# Patient Record
Sex: MALE | Race: BLACK OR AFRICAN AMERICAN | NOT HISPANIC OR LATINO | Employment: UNEMPLOYED | URBAN - METROPOLITAN AREA
[De-identification: names, ages, dates, MRNs, and addresses within clinical notes are randomized per-mention and may not be internally consistent; named-entity substitution may affect disease eponyms.]

---

## 2017-07-08 ENCOUNTER — HOSPITAL ENCOUNTER (EMERGENCY)
Facility: HOSPITAL | Age: 57
Discharge: HOME | End: 2017-07-08
Attending: EMERGENCY MEDICINE
Payer: MEDICAID

## 2017-07-08 VITALS
OXYGEN SATURATION: 97 % | WEIGHT: 286.19 LBS | RESPIRATION RATE: 16 BRPM | BODY MASS INDEX: 47.68 KG/M2 | TEMPERATURE: 99 F | HEIGHT: 65 IN | HEART RATE: 84 BPM | DIASTOLIC BLOOD PRESSURE: 68 MMHG | SYSTOLIC BLOOD PRESSURE: 107 MMHG

## 2017-07-08 DIAGNOSIS — M25.50 ARTHRALGIA OF MULTIPLE JOINTS: ICD-10-CM

## 2017-07-08 DIAGNOSIS — M25.531 ARTHRALGIA OF RIGHT WRIST: Primary | ICD-10-CM

## 2017-07-08 PROCEDURE — 99284 EMERGENCY DEPT VISIT MOD MDM: CPT | Mod: ,,, | Performed by: EMERGENCY MEDICINE

## 2017-07-08 PROCEDURE — 99283 EMERGENCY DEPT VISIT LOW MDM: CPT

## 2017-07-08 RX ORDER — METHOCARBAMOL 750 MG/1
750-1500 TABLET, FILM COATED ORAL EVERY 8 HOURS PRN
Qty: 30 TABLET | Refills: 0 | Status: SHIPPED | OUTPATIENT
Start: 2017-07-08 | End: 2017-07-13

## 2017-07-08 RX ORDER — NAPROXEN SODIUM 220 MG
220 TABLET ORAL
Status: ON HOLD | COMMUNITY
End: 2022-09-29 | Stop reason: HOSPADM

## 2017-07-08 RX ORDER — HYDROGEN PEROXIDE 3 %
20 SOLUTION, NON-ORAL MISCELLANEOUS
COMMUNITY

## 2017-07-08 RX ORDER — DOXAZOSIN 2 MG/1
2 TABLET ORAL NIGHTLY
Status: ON HOLD | COMMUNITY
End: 2022-09-29 | Stop reason: HOSPADM

## 2017-07-08 RX ORDER — METHYLPREDNISOLONE 4 MG/1
TABLET ORAL
Qty: 1 PACKAGE | Refills: 0 | Status: ON HOLD | OUTPATIENT
Start: 2017-07-08 | End: 2022-09-29 | Stop reason: HOSPADM

## 2017-07-08 NOTE — ED PROVIDER NOTES
"Encounter Date: 7/8/2017    SCRIBE #1 NOTE: I, Oskar Whitaker, am scribing for, and in the presence of, Dr. Teran.       History     Chief Complaint   Patient presents with    Joint Pain     all my joints been hurting for past few days     Time seen by provider: 3:11 PM    This is a 56 y.o. male with pertinent PMHx of stomach ulcer, anemia, carpal tunnel syndrome, and back pain, who presents to the ED with a chief complaint of gradual onset worsening constant moderate joint pain in the wrists, elbows, knees, hips, and ankles all over the past week with associated subjective fever, chills, diaphoresis, headache, appetite loss, and weight loss (140-129 over past month). Pt had been seeing his PCP for these complaints among others but recently had his PCP changed and he is unsatisfied with his new PCP so came to the ED for further evaluation. Pt also complains of a "spot" or knot on his left upper back deep in the muscle that also has been bothering him and states that he has been taking aleve for these pains but his new PCP told him to take tylenol instead. Pt denies any recent injuries, nausea, vomiting, diarrhea, or urinary symptoms. Pt also endorses a secondary complaint of left sided chest pain that lasted for 4 days but has since resolved and is no longer present. Pt saw his PCP for this and had an EKG done which was normal. There are no other associated symptoms or mitigating/aggravating factors reported at this time.           Review of patient's allergies indicates:  Allergies not on file  Past Medical History:   Diagnosis Date    Anemia     Anxiety     Back pain     Carpal tunnel syndrome     Stomach ulcer     "bleeding"     No past surgical history on file.  No family history on file.  Social History   Substance Use Topics    Smoking status: Not on file    Smokeless tobacco: Not on file    Alcohol use Not on file     Review of Systems   Constitutional: Positive for appetite change, chills, " diaphoresis, fever and unexpected weight change.   Gastrointestinal: Negative for nausea and vomiting.   Musculoskeletal: Positive for arthralgias, joint swelling and myalgias.   Neurological: Positive for headaches.       Physical Exam     Initial Vitals [07/08/17 1401]   BP Pulse Resp Temp SpO2   107/68 84 16 98.9 °F (37.2 °C) 97 %      MAP       81         Physical Exam    Nursing note and vitals reviewed.  Constitutional: He appears well-developed and well-nourished. No distress.   HENT:   Head: Normocephalic and atraumatic.   Right Ear: External ear normal.   Left Ear: External ear normal.   Mouth/Throat: Oropharynx is clear and moist.   Cardiovascular: Normal rate, regular rhythm and normal heart sounds. Exam reveals no gallop and no friction rub.    No murmur heard.  Pulmonary/Chest: Breath sounds normal. No respiratory distress. He has no wheezes. He has no rhonchi. He has no rales.   Abdominal: Soft. He exhibits no distension. There is no tenderness.   Musculoskeletal: He exhibits edema and tenderness.   There is some mild edema of the right wrist and left ankle without erythema, warmth, or focal bony tenderness. Pt has pain with full ROM of the right wrist, right elbow, bilateral knees, and left ankle but he is able to fully range these joints without difficulty and there is no erythema or warmth of any joint with no obvious effusions of any joints.   There is a spasm to the left upper trapezius muscle palpation of which reproduces some tenderness.          ED Course   Procedures  Labs Reviewed - No data to display          Medical Decision Making:   History:   Old Medical Records: I decided to obtain old medical records.  Initial Assessment:   This is a 56 y.o. male who presents with a complaint of poly-arthralgias as well as subjective fevers and weight loss. This could represent degenerative joint disease but I am also concerned about he possibility of a rheumatologic condition. I advised the pt that it  is beyond the scope of the ED to fully work this up however I will treat the pt with a medrol dose pack for symptom control. I will also place the pt on Robaxin for the left upper back pain, I suspect the mass he has been feeling may be a muscle spasm and I recommended he reassess this mass after treatment with muscle relaxer's. It sounds as though the left chest pain has been already evaluated by his PCP and is no longer an issue but was possible musculoskeletal pain related to his left upper back spasm. I will give the pt the number for rheumatology clinic for follow up.             Scribe Attestation:   Scribe #1: I performed the above scribed service and the documentation accurately describes the services I performed. I attest to the accuracy of the note.    Attending Attestation:           Physician Attestation for Scribe:  Physician Attestation Statement for Scribe #1: I, Dr. Teran, reviewed documentation, as scribed by Oskar Whitaker in my presence, and it is both accurate and complete.                 ED Course     Clinical Impression:   The primary encounter diagnosis was Arthralgia of right wrist. A diagnosis of Arthralgia of multiple joints was also pertinent to this visit.    Disposition:   Disposition: Discharged  Condition: Stable                        Lor Ardon MD  07/12/17 2027

## 2017-07-08 NOTE — ED NOTES
Patient states it feels like something is attacking his joints. His knees, ankles, elbows have been bothering him starting July 1st. Patient has history of carpel tunnel. Denies recent injury or trauma. Patient states he had chest pain 3 days ago and he went to [facility] santi.  Denies chest pain currently, but states pain has been throbbing in nature.

## 2017-07-08 NOTE — DISCHARGE INSTRUCTIONS
Our goal in the emergency department is to always give you outstanding care and exceptional service. You may receive a survey by mail or e-mail in the next week regarding your experience in our ED. We would greatly appreciate your completing and returning the survey. Your feedback provides us with a way to recognize our staff who give very good care and it helps us learn how to improve when your experience was below our aspiration of excellence.     You will need to follow-up with rheumatology for further evaluation of your multiple joint pain as well as your weight loss.

## 2017-08-08 ENCOUNTER — HOSPITAL ENCOUNTER (EMERGENCY)
Facility: HOSPITAL | Age: 57
Discharge: HOME | End: 2017-08-08
Attending: EMERGENCY MEDICINE
Payer: MEDICAID

## 2017-08-08 VITALS
DIASTOLIC BLOOD PRESSURE: 87 MMHG | WEIGHT: 135 LBS | TEMPERATURE: 99 F | HEIGHT: 64 IN | OXYGEN SATURATION: 96 % | RESPIRATION RATE: 18 BRPM | BODY MASS INDEX: 23.05 KG/M2 | SYSTOLIC BLOOD PRESSURE: 145 MMHG | HEART RATE: 84 BPM

## 2017-08-08 DIAGNOSIS — S61.011A LACERATION OF RIGHT THUMB WITHOUT FOREIGN BODY WITHOUT DAMAGE TO NAIL, INITIAL ENCOUNTER: Primary | ICD-10-CM

## 2017-08-08 PROCEDURE — 63600175 PHARM REV CODE 636 W HCPCS: Performed by: EMERGENCY MEDICINE

## 2017-08-08 PROCEDURE — 12001 RPR S/N/AX/GEN/TRNK 2.5CM/<: CPT

## 2017-08-08 PROCEDURE — 90715 TDAP VACCINE 7 YRS/> IM: CPT | Performed by: EMERGENCY MEDICINE

## 2017-08-08 PROCEDURE — 99283 EMERGENCY DEPT VISIT LOW MDM: CPT | Mod: 25

## 2017-08-08 PROCEDURE — 99284 EMERGENCY DEPT VISIT MOD MDM: CPT | Mod: 25,,,

## 2017-08-08 PROCEDURE — 25000003 PHARM REV CODE 250: Performed by: EMERGENCY MEDICINE

## 2017-08-08 PROCEDURE — 90471 IMMUNIZATION ADMIN: CPT | Performed by: EMERGENCY MEDICINE

## 2017-08-08 PROCEDURE — 12001 RPR S/N/AX/GEN/TRNK 2.5CM/<: CPT | Mod: F5,,, | Performed by: EMERGENCY MEDICINE

## 2017-08-08 RX ORDER — LIDOCAINE HYDROCHLORIDE 10 MG/ML
10 INJECTION INFILTRATION; PERINEURAL ONCE
Status: COMPLETED | OUTPATIENT
Start: 2017-08-08 | End: 2017-08-08

## 2017-08-08 RX ADMIN — CLOSTRIDIUM TETANI TOXOID ANTIGEN (FORMALDEHYDE INACTIVATED), CORYNEBACTERIUM DIPHTHERIAE TOXOID ANTIGEN (FORMALDEHYDE INACTIVATED), BORDETELLA PERTUSSIS TOXOID ANTIGEN (GLUTARALDEHYDE INACTIVATED), BORDETELLA PERTUSSIS FILAMENTOUS HEMAGGLUTININ ANTIGEN (FORMALDEHYDE INACTIVATED), BORDETELLA PERTUSSIS PERTACTIN ANTIGEN, AND BORDETELLA PERTUSSIS FIMBRIAE 2/3 ANTIGEN 0.5 ML: 5; 2; 2.5; 5; 3; 5 INJECTION, SUSPENSION INTRAMUSCULAR at 06:08

## 2017-08-08 RX ADMIN — LIDOCAINE HYDROCHLORIDE 10 ML: 10 INJECTION, SOLUTION INFILTRATION; PERINEURAL at 07:08

## 2017-08-08 NOTE — ED TRIAGE NOTES
Laceration to right thumb while working on a toilet today.    GENERAL: The patient is well-developed and well-nourished in no apparent distress. Alert and oriented x4.                                                HEENT: Head is normocephalic and atraumatic. Extraocular muscles are intact. Pupils are equal, round, and reactive to light and accommodation. Nares appeared normal. Mouth is well hydrated and without lesions. Mucous membranes are moist. Posterior pharynx clear of any exudate or lesions.    NECK: Supple. No carotid bruits. No lymphadenopathy or thyromegaly.    LUNGS: Clear to auscultation.    HEART: Regular rate and rhythm without murmur.     ABDOMEN: Soft, nontender, and nondistended. Positive bowel sounds. No hepatosplenomegaly was noted.     EXTREMITIES: Without any cyanosis, clubbing, rash, lesions or edema.     NEUROLOGIC: Cranial nerves II through XII are grossly intact.     PSYCHIATRIC: Flat affect, but denies suicidal or homicidal ideations.    SKIN: No ulceration or induration present.

## 2017-08-09 NOTE — ED PROVIDER NOTES
"Encounter Date: 8/8/2017    SCRIBE #1 NOTE: I, Neha Rivera, am scribing for, and in the presence of,  Dr. Lewis. I have scribed the following portions of the note - the APC attestation.       History     Chief Complaint   Patient presents with    Laceration     after lifting broken toilet     57yo male with medical history of acid reflux presents to ED with thumb laceration. Around 3:30p this afternoon, patient was throwing a toilet into a dumpster when the edge caught his thumb. Patient controlled bleeding and poured peroxide over the wound. Patient not sure of last tetanus. Patient denies crush injury. Patient denies fever, chills, n/v, fever, chills, cp, sob, decreased sensation, weakness, syncope.            Review of patient's allergies indicates:  No Known Allergies  Past Medical History:   Diagnosis Date    Anemia     Anxiety     Arthritis     Back pain     Carpal tunnel syndrome     GERD (gastroesophageal reflux disease)     Stomach ulcer     "bleeding"     History reviewed. No pertinent surgical history.  Family History   Problem Relation Age of Onset    No Known Problems Mother     No Known Problems Father      Social History   Substance Use Topics    Smoking status: Current Every Day Smoker     Packs/day: 1.00     Years: 25.00     Types: Cigarettes    Smokeless tobacco: Never Used    Alcohol use No     Review of Systems   Constitutional: Negative for diaphoresis and fever.   HENT: Negative for nosebleeds.    Eyes: Negative for visual disturbance.   Respiratory: Negative for cough and shortness of breath.    Cardiovascular: Negative for chest pain and leg swelling.   Gastrointestinal: Negative for abdominal distention, nausea and vomiting.   Genitourinary: Negative for dysuria, flank pain and hematuria.   Musculoskeletal: Negative for neck pain.   Skin: Positive for wound. Negative for rash.   Neurological: Negative for syncope, weakness, light-headedness, numbness and headaches. "   Psychiatric/Behavioral: The patient is not nervous/anxious.        Physical Exam     Initial Vitals [08/08/17 1645]   BP Pulse Resp Temp SpO2   110/80 105 16 98.8 °F (37.1 °C) 97 %      MAP       90         Physical Exam    Vitals reviewed.  Constitutional: Vital signs are normal. He appears well-developed and well-nourished. He is not diaphoretic. No distress.   HENT:   Head: Normocephalic and atraumatic.   Nose: Nose normal.   Mouth/Throat: Oropharynx is clear and moist.   Eyes: Conjunctivae, EOM and lids are normal. Pupils are equal, round, and reactive to light. Lids are everted and swept, no foreign bodies found.   Neck: Trachea normal and normal range of motion. Neck supple.   Cardiovascular: Normal rate, regular rhythm, intact distal pulses and normal pulses.   Pulmonary/Chest: Breath sounds normal. He has no wheezes. He has no rhonchi. He has no rales. He exhibits no tenderness.   Abdominal: Soft. Normal appearance and bowel sounds are normal. He exhibits no distension. There is no tenderness.   Musculoskeletal: Normal range of motion.   Neurological: He is alert and oriented to person, place, and time. He has normal strength. No sensory deficit.   Skin: Skin is warm and dry. Capillary refill takes less than 2 seconds. Laceration noted. No rash noted. No cyanosis.   1.5cm laceration noted to right thumb distal to PIP. No tendon involvement. Bleeding controlled. Full ROM intact. Sensation intact. Strength intact.    Psychiatric: He has a normal mood and affect.         ED Course   Lac Repair  Date/Time: 8/8/2017 7:28 PM  Performed by: KRISTA COULTER  Authorized by: MIGUEL GREGG   Body area: upper extremity  Location details: right thumb  Laceration length: 1.5 cm  Foreign bodies: no foreign bodies  Tendon involvement: none  Nerve involvement: none  Anesthesia: local infiltration    Anesthesia:  Local Anesthetic: lidocaine 1% without epinephrine  Anesthetic total: 2 mL  Patient sedated:  no  Preparation: Patient was prepped and draped in the usual sterile fashion.  Irrigation solution: saline  Irrigation method: syringe  Amount of cleaning: standard  Skin closure: Ethilon (5.0 ethilon)  Number of sutures: 3  Technique: simple  Approximation: close  Approximation difficulty: simple  Dressing: non-stick sterile dressing  Patient tolerance: Patient tolerated the procedure well with no immediate complications  Comments: Patient gave verbal consent. Patient tolerated procedure well.         Labs Reviewed - No data to display          Medical Decision Making:   History:   Old Medical Records: I decided to obtain old medical records.       APC / Resident Notes:   57yo male with medical history of acid reflux presents to ED with thumb laceration. Cardiac exam reveals regular rate and rhythm. Lungs clear bilaterally on auscultation with no decreased breath sounds. No chest wall tenderness. Abdomen is soft, non tender, non distended with normal bowel sounds x 4. 1.5cm laceration noted to right thumb distal to PIP. No tendon involvement. Bleeding controlled. Full ROM intact. Sensation intact. Strength intact. Distal pulses intact.     3 sutures placed. Patient tolerate procedure well. Told to have sutures taken out in 7 days.     Tdap vaccine updated.     DDX includes but is not limited to laceration, cellulitis, tetanus up date. I considered but do not suspect a fracture.    Discharged to home in stable condition, return to ED warnings given, follow up and patient care instructions given.      I have discussed and reviewed with my supervising physician.          Scribe Attestation:   Scribe #1: I performed the above scribed service and the documentation accurately describes the services I performed. I attest to the accuracy of the note.    Attending Attestation:     Physician Attestation Statement for NP/PA:   I discussed this assessment and plan of this patient with the NP/PA, but I did not personally  examine the patient. The face to face encounter was performed by the NP/PA.    Other NP/PA Attestation Additions:      Medical Decision Making: Laceration       Physician Attestation for Scribe:  Physician Attestation Statement for Scribe #1: I, Dr. Lewis, reviewed documentation, as scribed by Neha Rivera in my presence, and it is both accurate and complete.                 ED Course     Clinical Impression:   The encounter diagnosis was Laceration of right thumb without foreign body without damage to nail, initial encounter.    Disposition:   Disposition: Discharged  Condition: Stable                        MALLORY GilletteC  08/08/17 2040

## 2017-08-16 ENCOUNTER — HOSPITAL ENCOUNTER (EMERGENCY)
Facility: HOSPITAL | Age: 57
Discharge: HOME | End: 2017-08-16
Attending: FAMILY MEDICINE
Payer: MEDICAID

## 2017-08-16 VITALS
WEIGHT: 139 LBS | SYSTOLIC BLOOD PRESSURE: 120 MMHG | DIASTOLIC BLOOD PRESSURE: 82 MMHG | OXYGEN SATURATION: 98 % | TEMPERATURE: 98 F | BODY MASS INDEX: 23.73 KG/M2 | RESPIRATION RATE: 16 BRPM | HEIGHT: 64 IN | HEART RATE: 78 BPM

## 2017-08-16 DIAGNOSIS — S61.011D: Primary | ICD-10-CM

## 2017-08-16 DIAGNOSIS — Z48.02 ENCOUNTER FOR REMOVAL OF SUTURES: ICD-10-CM

## 2017-08-16 PROCEDURE — 99281 EMR DPT VST MAYX REQ PHY/QHP: CPT | Mod: ,,, | Performed by: FAMILY MEDICINE

## 2017-08-16 PROCEDURE — 99281 EMR DPT VST MAYX REQ PHY/QHP: CPT

## 2017-08-16 NOTE — ED TRIAGE NOTES
Patient here for suture removal right thumb.  Margins are well approximated and incision line closed with scar tissue formation.  Has 2 sutures remaining.

## 2017-08-16 NOTE — ED PROVIDER NOTES
"Encounter Date: 8/16/2017    SCRIBE #1 NOTE: IShilpi, am scribing for, and in the presence of, Dr. Santos.       History     Chief Complaint   Patient presents with    Suture / Staple Removal     Time seen by provider: 10:42 AM    This is a 56 y.o. male with a history of carpal tunnel syndrome and arthritis who presents with complaint of laceration to the right thumb that was sutured here 9 days ago.  The patient returns for suture removal. He denies any problems with the wound, fever, or chills and indicates that his thumb ROM is at baseline.      The history is provided by the patient.     Review of patient's allergies indicates:  No Known Allergies  Past Medical History:   Diagnosis Date    Anemia     Anxiety     Arthritis     Back pain     Carpal tunnel syndrome     GERD (gastroesophageal reflux disease)     Stomach ulcer     "bleeding"     History reviewed. No pertinent surgical history.  Family History   Problem Relation Age of Onset    No Known Problems Mother     No Known Problems Father      Social History   Substance Use Topics    Smoking status: Current Every Day Smoker     Packs/day: 1.00     Years: 25.00     Types: Cigarettes    Smokeless tobacco: Never Used    Alcohol use No     Review of Systems   Constitutional: Negative for chills and fever.   Musculoskeletal:        Full ROM in right thumb. Denies problems with the wound.       Physical Exam     Initial Vitals [08/16/17 0939]   BP Pulse Resp Temp SpO2   120/82 78 16 98 °F (36.7 °C) 98 %      MAP       94.67         Physical Exam    Nursing note and vitals reviewed.  Musculoskeletal: Normal range of motion.   Full ROM in the right thumb.   Neurological:   Neurovascular is at baseline.   Skin:   Semicircular 1 CM laceration over the dorsum of the right first metacarpal joint. This is well-healed. The suture is removed. Negative for drainage or secondary infection.         ED Course   Procedures  Labs Reviewed - No data to display   "        Medical Decision Making:   History:   Old Medical Records: I decided to obtain old medical records.  ED Management:  Wound clean.  Sutures removed.  Patient stable for discharge            Scribe Attestation:   Scribe #1: I performed the above scribed service and the documentation accurately describes the services I performed. I attest to the accuracy of the note.    Attending Attestation:           Physician Attestation for Scribe:  Physician Attestation Statement for Scribe #1: I, Dr. Santos, reviewed documentation, as scribed by Shilpi Cordoba in my presence, and it is both accurate and complete.                 ED Course     Clinical Impression:   There were no encounter diagnoses.    Disposition:   Disposition: Discharged  Condition: Stable                        Danis Santos MD  08/16/17 1078

## 2018-01-30 ENCOUNTER — HOSPITAL ENCOUNTER (EMERGENCY)
Facility: HOSPITAL | Age: 58
Discharge: HOME | End: 2018-01-30
Attending: EMERGENCY MEDICINE
Payer: MEDICAID

## 2018-01-30 VITALS
TEMPERATURE: 101 F | BODY MASS INDEX: 24.03 KG/M2 | OXYGEN SATURATION: 96 % | RESPIRATION RATE: 18 BRPM | DIASTOLIC BLOOD PRESSURE: 68 MMHG | WEIGHT: 140 LBS | SYSTOLIC BLOOD PRESSURE: 121 MMHG | HEART RATE: 115 BPM

## 2018-01-30 DIAGNOSIS — R07.81 RIB PAIN: ICD-10-CM

## 2018-01-30 DIAGNOSIS — R05.9 COUGH: ICD-10-CM

## 2018-01-30 DIAGNOSIS — R68.89 FLU-LIKE SYMPTOMS: Primary | ICD-10-CM

## 2018-01-30 PROCEDURE — 25000003 PHARM REV CODE 250: Performed by: EMERGENCY MEDICINE

## 2018-01-30 PROCEDURE — 99283 EMERGENCY DEPT VISIT LOW MDM: CPT

## 2018-01-30 PROCEDURE — 99284 EMERGENCY DEPT VISIT MOD MDM: CPT | Mod: ,,, | Performed by: EMERGENCY MEDICINE

## 2018-01-30 RX ORDER — OSELTAMIVIR PHOSPHATE 75 MG/1
75 CAPSULE ORAL 2 TIMES DAILY
Qty: 10 CAPSULE | Refills: 0 | Status: SHIPPED | OUTPATIENT
Start: 2018-01-30 | End: 2018-02-04

## 2018-01-30 RX ORDER — ACETAMINOPHEN 325 MG/1
650 TABLET ORAL
Status: COMPLETED | OUTPATIENT
Start: 2018-01-30 | End: 2018-01-30

## 2018-01-30 RX ORDER — BENZONATATE 200 MG/1
200 CAPSULE ORAL 3 TIMES DAILY PRN
Qty: 20 CAPSULE | Refills: 0 | Status: SHIPPED | OUTPATIENT
Start: 2018-01-30

## 2018-01-30 RX ADMIN — ACETAMINOPHEN 650 MG: 325 TABLET ORAL at 11:01

## 2018-01-30 NOTE — Clinical Note
Darshan Hoskins discharge to home/self care.    - Condition on Discharge: Good.  - Patient walked out of the emergency department.  - The patient left the ED  - The discharge instructions were discussed with the patient/parent.  - They state an understand ing of the discharge instructions.  - Instructed patient/parent to go to the discharge window.

## 2018-01-30 NOTE — ED TRIAGE NOTES
Presents to ER with flu like symptoms since last Friday.., Reports back pain, left rib pain, productive cough of green sputum, and generalized body aches.    Pt identifiers checked and correct  LOC: The patient is awake, alert, aware of environment with an appropriate affect. Oriented x3, speaking appropriately  APPEARANCE: Pt resting comfortably, in no acute distress, pt is clean and well groomed, clothing properly fastened  SKIN: Skin warm, dry and intact, normal skin turgor, moist mucus membranes  RESPIRATORY: Airway is open and patent, respirations are spontaneous, even and unlabored, normal effort and rate  MUSCULOSKELETAL: No obvious deformities.

## 2018-01-30 NOTE — ED PROVIDER NOTES
"Encounter Date: 1/30/2018    SCRIBE #1 NOTE: I, Juan Sparrow, am scribing for, and in the presence of,  Obi Avila MD. I have scribed the entire note.       History     Chief Complaint   Patient presents with    flu-like symptoms     headaches, cough, fever     Time seen by provider: 10:30 AM    This is a 57 y.o. male who presents to the Emergency Department with complaint of headache, generalized body aches particularly to his back and left side, productive cough with green sputum, sore throat, intermittent fever for the last 4 days. Patient state that his symptoms have been gradually worsening since initial onset, prompting visit the ED today.       The history is provided by the patient and medical records.     Review of patient's allergies indicates:  No Known Allergies  Past Medical History:   Diagnosis Date    Anemia     Anxiety     Arthritis     Back pain     Carpal tunnel syndrome     GERD (gastroesophageal reflux disease)     Stomach ulcer     "bleeding"     History reviewed. No pertinent surgical history.  Family History   Problem Relation Age of Onset    No Known Problems Mother     No Known Problems Father      Social History   Substance Use Topics    Smoking status: Current Every Day Smoker     Packs/day: 1.00     Years: 25.00     Types: Cigarettes    Smokeless tobacco: Never Used    Alcohol use No     Review of Systems   Constitutional: Positive for fever.   HENT: Positive for sore throat.    Respiratory: Positive for cough. Negative for shortness of breath.    Cardiovascular: Negative for chest pain.   Gastrointestinal: Negative for nausea.   Genitourinary: Negative for dysuria.   Musculoskeletal: Negative for back pain.        Positive for body aches. Positive for left-sided rib pain.   Skin: Negative for rash.   Neurological: Positive for headaches. Negative for weakness.   Hematological: Does not bruise/bleed easily.       Physical Exam     Initial Vitals [01/30/18 0944]   BP Pulse Resp " Temp SpO2   121/68 (!) 115 18 (!) 101.3 °F (38.5 °C) 96 %      MAP       85.67         Physical Exam    Nursing note and vitals reviewed.  Constitutional: He appears well-developed and well-nourished. He is not diaphoretic. No distress.   HENT:   Head: Normocephalic and atraumatic.   Mouth/Throat: Posterior oropharyngeal erythema present. No oropharyngeal exudate.   Mild erythema and edema to nasal mucosa with yellowish discharge.    Eyes: Conjunctivae and EOM are normal. Pupils are equal, round, and reactive to light.   Neck: Normal range of motion. Neck supple. No JVD present.   Cardiovascular: Normal rate, regular rhythm and normal heart sounds.   No murmur heard.  Pulmonary/Chest: No respiratory distress. He has wheezes (mild expiratory wheezing). He has no rhonchi. He has no rales.   Patient has adventitious breath sounds.   Abdominal: Soft. Bowel sounds are normal. He exhibits no distension and no mass. There is no tenderness. There is no rebound and no guarding.   Musculoskeletal: Normal range of motion. He exhibits no edema or tenderness.   Neurological: He is alert and oriented to person, place, and time. He has normal strength. No cranial nerve deficit or sensory deficit.   Skin: Skin is warm and dry. No rash noted.   Psychiatric: He has a normal mood and affect.         ED Course   Procedures  Labs Reviewed - No data to display       X-Rays:   Independently Interpreted Readings:   Chest X-Ray: Normal heart size.  No infiltrates.  No acute abnormalities.     Medical Decision Making:   History:   Old Medical Records: I decided to obtain old medical records.  Initial Assessment:   This is a 57 y.o. male who I believe has a viral upper respiratory infection.    Workup including chest X-ray was negative.  Based upon the H&P, workup the patient does not appear to have a serious bacterial infection.  I doubt pneumonia, sepsis, AOM, bacterial sinusitis, strep pharyngitis, peritonsillar abscess, meningitis.   I  believe that no further workup/treatment is needed at this time and that the patient is stable for discharge.  Plan to give Rx for Tamflu and tessalon Perles considering the patient is febrile and has flu-like symptoms. Will not give steroids because of suspected flu.    Clinical impression and plan discussed with patient.   Pt to call for follow up with PCP or referred physician in 7 days.   Pt is to return to the ED immediately for any new or worsening symptoms, or for any other concerns.    Pt had time for ask questions to which they were addressed. Pt expressed understanding.  Independently Interpreted Test(s):   I have ordered and independently interpreted X-rays - see prior notes.  Clinical Tests:   Radiological Study: Ordered and Reviewed            Scribe Attestation:   Scribe #1: I performed the above scribed service and the documentation accurately describes the services I performed. I attest to the accuracy of the note.            ED Course      Clinical Impression:   The primary encounter diagnosis was Flu-like symptoms. Diagnoses of Cough and Rib pain were also pertinent to this visit.    Disposition:   Disposition: Discharged  Condition: Stable       I, Dr. Obi Avila, personally performed the services described in this documentation.   All medical record entries made by the scribe were at my direction and in my presence.   I have reviewed the chart and agree that the record is accurate and complete.   Obi Avila MD.                      Obi Avila MD  02/05/18 0007

## 2021-02-05 ENCOUNTER — HOSPITAL ENCOUNTER (EMERGENCY)
Facility: HOSPITAL | Age: 61
Discharge: HOME | End: 2021-02-05
Attending: EMERGENCY MEDICINE
Payer: MEDICAID

## 2021-02-05 VITALS
DIASTOLIC BLOOD PRESSURE: 73 MMHG | HEART RATE: 93 BPM | BODY MASS INDEX: 22.88 KG/M2 | TEMPERATURE: 97 F | WEIGHT: 134 LBS | SYSTOLIC BLOOD PRESSURE: 105 MMHG | HEIGHT: 64 IN | OXYGEN SATURATION: 96 % | RESPIRATION RATE: 18 BRPM

## 2021-02-05 DIAGNOSIS — R53.83 FATIGUE, UNSPECIFIED TYPE: Primary | ICD-10-CM

## 2021-02-05 DIAGNOSIS — R35.89 POLYURIA: ICD-10-CM

## 2021-02-05 LAB
BILIRUB UR QL STRIP: NEGATIVE
BUN SERPL-MCNC: 18 MG/DL (ref 6–30)
CHLORIDE SERPL-SCNC: 100 MMOL/L (ref 95–110)
CLARITY UR REFRACT.AUTO: CLEAR
COLOR UR AUTO: YELLOW
CREAT SERPL-MCNC: 1.3 MG/DL (ref 0.5–1.4)
CTP QC/QA: YES
GLUCOSE SERPL-MCNC: 87 MG/DL (ref 70–110)
GLUCOSE UR QL STRIP: NEGATIVE
HCT VFR BLD CALC: 48 %PCV (ref 36–54)
HCV AB SERPL QL IA: NEGATIVE
HGB UR QL STRIP: NEGATIVE
HIV 1+2 AB+HIV1 P24 AG SERPL QL IA: NEGATIVE
KETONES UR QL STRIP: NEGATIVE
LEUKOCYTE ESTERASE UR QL STRIP: NEGATIVE
NITRITE UR QL STRIP: NEGATIVE
PH UR STRIP: 5 [PH] (ref 5–8)
POC IONIZED CALCIUM: 1.14 MMOL/L (ref 1.06–1.42)
POC TCO2 (MEASURED): 27 MMOL/L (ref 23–29)
POCT GLUCOSE: 81 MG/DL (ref 70–110)
POTASSIUM BLD-SCNC: 4.4 MMOL/L (ref 3.5–5.1)
PROT UR QL STRIP: NEGATIVE
SAMPLE: NORMAL
SARS-COV-2 RDRP RESP QL NAA+PROBE: NEGATIVE
SODIUM BLD-SCNC: 136 MMOL/L (ref 136–145)
SP GR UR STRIP: 1.01 (ref 1–1.03)
URN SPEC COLLECT METH UR: NORMAL

## 2021-02-05 PROCEDURE — 99284 PR EMERGENCY DEPT VISIT,LEVEL IV: ICD-10-PCS | Mod: ,,, | Performed by: PHYSICIAN ASSISTANT

## 2021-02-05 PROCEDURE — 81003 URINALYSIS AUTO W/O SCOPE: CPT

## 2021-02-05 PROCEDURE — 82962 GLUCOSE BLOOD TEST: CPT

## 2021-02-05 PROCEDURE — 86803 HEPATITIS C AB TEST: CPT

## 2021-02-05 PROCEDURE — U0002 COVID-19 LAB TEST NON-CDC: HCPCS | Performed by: EMERGENCY MEDICINE

## 2021-02-05 PROCEDURE — 99283 EMERGENCY DEPT VISIT LOW MDM: CPT

## 2021-02-05 PROCEDURE — 80047 BASIC METABLC PNL IONIZED CA: CPT

## 2021-02-05 PROCEDURE — 99284 EMERGENCY DEPT VISIT MOD MDM: CPT | Mod: ,,, | Performed by: PHYSICIAN ASSISTANT

## 2021-02-05 PROCEDURE — 86703 HIV-1/HIV-2 1 RESULT ANTBDY: CPT

## 2021-02-05 RX ORDER — TAMSULOSIN HYDROCHLORIDE 0.4 MG/1
0.4 CAPSULE ORAL DAILY
COMMUNITY

## 2021-02-05 RX ORDER — PANTOPRAZOLE SODIUM 40 MG/1
40 TABLET, DELAYED RELEASE ORAL DAILY
Status: ON HOLD | COMMUNITY
End: 2022-09-29 | Stop reason: HOSPADM

## 2021-02-05 RX ORDER — BUDESONIDE AND FORMOTEROL FUMARATE DIHYDRATE 160; 4.5 UG/1; UG/1
2 AEROSOL RESPIRATORY (INHALATION) EVERY 12 HOURS
COMMUNITY

## 2022-09-20 ENCOUNTER — HOSPITAL ENCOUNTER (EMERGENCY)
Facility: HOSPITAL | Age: 62
Discharge: HOME | End: 2022-09-20
Attending: EMERGENCY MEDICINE
Payer: MEDICAID

## 2022-09-20 VITALS
WEIGHT: 134.06 LBS | RESPIRATION RATE: 18 BRPM | SYSTOLIC BLOOD PRESSURE: 117 MMHG | DIASTOLIC BLOOD PRESSURE: 74 MMHG | OXYGEN SATURATION: 95 % | BODY MASS INDEX: 23.01 KG/M2 | TEMPERATURE: 99 F | HEART RATE: 91 BPM

## 2022-09-20 DIAGNOSIS — R07.2 PRECORDIAL PAIN: Primary | ICD-10-CM

## 2022-09-20 DIAGNOSIS — R07.9 CHEST PAIN: ICD-10-CM

## 2022-09-20 LAB
ALBUMIN SERPL BCP-MCNC: 3.6 G/DL (ref 3.5–5.2)
ALP SERPL-CCNC: 102 U/L (ref 55–135)
ALT SERPL W/O P-5'-P-CCNC: 24 U/L (ref 10–44)
ANION GAP SERPL CALC-SCNC: 6 MMOL/L (ref 8–16)
AST SERPL-CCNC: 33 U/L (ref 10–40)
BASOPHILS # BLD AUTO: 0.02 K/UL (ref 0–0.2)
BASOPHILS NFR BLD: 0.4 % (ref 0–1.9)
BILIRUB SERPL-MCNC: 0.2 MG/DL (ref 0.1–1)
BUN SERPL-MCNC: 11 MG/DL (ref 8–23)
CALCIUM SERPL-MCNC: 9.1 MG/DL (ref 8.7–10.5)
CHLORIDE SERPL-SCNC: 101 MMOL/L (ref 95–110)
CO2 SERPL-SCNC: 29 MMOL/L (ref 23–29)
CREAT SERPL-MCNC: 1.3 MG/DL (ref 0.5–1.4)
DIFFERENTIAL METHOD: ABNORMAL
EOSINOPHIL # BLD AUTO: 0 K/UL (ref 0–0.5)
EOSINOPHIL NFR BLD: 0.2 % (ref 0–8)
ERYTHROCYTE [DISTWIDTH] IN BLOOD BY AUTOMATED COUNT: 13.5 % (ref 11.5–14.5)
EST. GFR  (NO RACE VARIABLE): >60 ML/MIN/1.73 M^2
GLUCOSE SERPL-MCNC: 90 MG/DL (ref 70–110)
HCT VFR BLD AUTO: 44.1 % (ref 40–54)
HCV AB SERPL QL IA: NORMAL
HGB BLD-MCNC: 15 G/DL (ref 14–18)
HIV 1+2 AB+HIV1 P24 AG SERPL QL IA: NORMAL
IMM GRANULOCYTES # BLD AUTO: 0.01 K/UL (ref 0–0.04)
IMM GRANULOCYTES NFR BLD AUTO: 0.2 % (ref 0–0.5)
LYMPHOCYTES # BLD AUTO: 1.5 K/UL (ref 1–4.8)
LYMPHOCYTES NFR BLD: 30.8 % (ref 18–48)
MCH RBC QN AUTO: 32.1 PG (ref 27–31)
MCHC RBC AUTO-ENTMCNC: 34 G/DL (ref 32–36)
MCV RBC AUTO: 94 FL (ref 82–98)
MONOCYTES # BLD AUTO: 0.4 K/UL (ref 0.3–1)
MONOCYTES NFR BLD: 7.6 % (ref 4–15)
NEUTROPHILS # BLD AUTO: 2.9 K/UL (ref 1.8–7.7)
NEUTROPHILS NFR BLD: 60.8 % (ref 38–73)
NRBC BLD-RTO: 0 /100 WBC
PLATELET # BLD AUTO: 204 K/UL (ref 150–450)
PMV BLD AUTO: 9.3 FL (ref 9.2–12.9)
POTASSIUM SERPL-SCNC: 4.2 MMOL/L (ref 3.5–5.1)
PROT SERPL-MCNC: 7.4 G/DL (ref 6–8.4)
RBC # BLD AUTO: 4.67 M/UL (ref 4.6–6.2)
SARS-COV-2 RDRP RESP QL NAA+PROBE: NEGATIVE
SODIUM SERPL-SCNC: 136 MMOL/L (ref 136–145)
TROPONIN I SERPL DL<=0.01 NG/ML-MCNC: <0.006 NG/ML (ref 0–0.03)
WBC # BLD AUTO: 4.84 K/UL (ref 3.9–12.7)

## 2022-09-20 PROCEDURE — 99285 EMERGENCY DEPT VISIT HI MDM: CPT | Mod: 25

## 2022-09-20 PROCEDURE — 93010 EKG 12-LEAD: ICD-10-PCS | Mod: ,,, | Performed by: INTERNAL MEDICINE

## 2022-09-20 PROCEDURE — 86803 HEPATITIS C AB TEST: CPT | Performed by: PHYSICIAN ASSISTANT

## 2022-09-20 PROCEDURE — 99285 EMERGENCY DEPT VISIT HI MDM: CPT | Mod: CS,,, | Performed by: EMERGENCY MEDICINE

## 2022-09-20 PROCEDURE — 25000003 PHARM REV CODE 250: Performed by: EMERGENCY MEDICINE

## 2022-09-20 PROCEDURE — 80053 COMPREHEN METABOLIC PANEL: CPT | Performed by: EMERGENCY MEDICINE

## 2022-09-20 PROCEDURE — 87389 HIV-1 AG W/HIV-1&-2 AB AG IA: CPT | Performed by: PHYSICIAN ASSISTANT

## 2022-09-20 PROCEDURE — 85025 COMPLETE CBC W/AUTO DIFF WBC: CPT | Performed by: EMERGENCY MEDICINE

## 2022-09-20 PROCEDURE — 93010 ELECTROCARDIOGRAM REPORT: CPT | Mod: ,,, | Performed by: INTERNAL MEDICINE

## 2022-09-20 PROCEDURE — 99285 PR EMERGENCY DEPT VISIT,LEVEL V: ICD-10-PCS | Mod: CS,,, | Performed by: EMERGENCY MEDICINE

## 2022-09-20 PROCEDURE — U0002 COVID-19 LAB TEST NON-CDC: HCPCS | Performed by: EMERGENCY MEDICINE

## 2022-09-20 PROCEDURE — 84484 ASSAY OF TROPONIN QUANT: CPT | Performed by: EMERGENCY MEDICINE

## 2022-09-20 PROCEDURE — 93005 ELECTROCARDIOGRAM TRACING: CPT

## 2022-09-20 RX ORDER — ASPIRIN 325 MG
325 TABLET ORAL
Status: COMPLETED | OUTPATIENT
Start: 2022-09-20 | End: 2022-09-20

## 2022-09-20 RX ADMIN — ASPIRIN 325 MG ORAL TABLET 325 MG: 325 PILL ORAL at 09:09

## 2022-09-20 NOTE — DISCHARGE INSTRUCTIONS
Continue take Tylenol/ibuprofen as needed for pain.  Follow-up with primary doctor for further outpatient testing if they feel it is indicated.  Return to emergency department for worsening symptoms.

## 2022-09-20 NOTE — ED PROVIDER NOTES
"Encounter Date: 9/20/2022       History     Chief Complaint   Patient presents with    Dizziness     X1 week. Denies syncopal episode.      Patient is a 61-year-old male past medical history of anxiety, arthritis, back pain, chronic shoulder pain presenting today with multiple complaints.  Patient states for the past week he has had intermittent left-sided chest pain that occurs while at rest.  He also reports chronic left shoulder pain, worse with movement.  He has occasionally had episodes of lightheadedness while walking.  No fevers or chills.  No infectious symptoms.  Denies abdominal pain but does report nausea without vomiting.  No history of cardiac issues, no stents in the past.    Review of patient's allergies indicates:  No Known Allergies  Past Medical History:   Diagnosis Date    Anemia     Anxiety     Arthritis     Back pain     Carpal tunnel syndrome     GERD (gastroesophageal reflux disease)     Stomach ulcer     "bleeding"     No past surgical history on file.  Family History   Problem Relation Age of Onset    No Known Problems Mother     No Known Problems Father      Social History     Tobacco Use    Smoking status: Every Day     Packs/day: 1.00     Years: 25.00     Pack years: 25.00     Types: Cigarettes    Smokeless tobacco: Never   Substance Use Topics    Alcohol use: No    Drug use: No     Review of Systems   Constitutional:  Negative for chills and fever.   HENT:  Negative for sore throat.    Respiratory:  Negative for cough and shortness of breath.    Cardiovascular:  Positive for chest pain.   Gastrointestinal:  Positive for nausea. Negative for abdominal pain and vomiting.   Genitourinary:  Negative for dysuria.   Musculoskeletal:  Positive for arthralgias (left shoulder pain). Negative for back pain.   Skin:  Negative for rash.   Neurological:  Positive for light-headedness. Negative for weakness.   Hematological:  Does not bruise/bleed easily.     Physical Exam     Initial Vitals [09/20/22 " 0800]   BP Pulse Resp Temp SpO2   135/81 96 18 99.1 °F (37.3 °C) 97 %      MAP       --         Physical Exam    Nursing note and vitals reviewed.  Constitutional: He appears well-developed and well-nourished. No distress.   HENT:   Mouth/Throat: Oropharynx is clear and moist.   Eyes: Conjunctivae are normal.   Neck: Neck supple.   Cardiovascular:  Normal rate, regular rhythm and intact distal pulses.           Pulmonary/Chest: Breath sounds normal. He has no wheezes. He has no rales.   Abdominal: Abdomen is soft. Bowel sounds are normal. There is no abdominal tenderness.   Musculoskeletal:         General: No edema.      Cervical back: Neck supple.      Comments: FROM of left shoulder, no effusion         Lymphadenopathy:     He has no cervical adenopathy.   Neurological: He is alert and oriented to person, place, and time. He has normal strength.   Skin: No rash noted.   Psychiatric: He has a normal mood and affect.       ED Course   Procedures  Labs Reviewed   CBC W/ AUTO DIFFERENTIAL - Abnormal; Notable for the following components:       Result Value    MCH 32.1 (*)     All other components within normal limits   COMPREHENSIVE METABOLIC PANEL - Abnormal; Notable for the following components:    Anion Gap 6 (*)     All other components within normal limits   HIV 1 / 2 ANTIBODY    Narrative:     Release to patient->Immediate   HEPATITIS C ANTIBODY    Narrative:     Release to patient->Immediate   TROPONIN I   SARS-COV-2 RNA AMPLIFICATION, QUAL     EKG Readings: (Independently Interpreted)   EKG:  Normal sinus rhythm at 87, normal intervals, normal axis, no ischemic changes     Imaging Results              X-Ray Chest PA And Lateral (Final result)  Result time 09/20/22 09:56:55      Final result by Santiago Wilburn MD (09/20/22 09:56:55)                   Impression:      No acute radiographic findings in the chest.      Electronically signed by: Santiago Wilburn MD  Date:    09/20/2022  Time:    09:56                Narrative:    EXAMINATION:  XR CHEST PA AND LATERAL    CLINICAL HISTORY:  Chest Pain;    TECHNIQUE:  PA and lateral views of the chest were performed.    COMPARISON:  01/30/2018    FINDINGS:  The lungs are clear, with normal appearance of pulmonary vasculature and no pleural effusion or pneumothorax.    The cardiac silhouette is normal in size. The hilar and mediastinal contours are unremarkable.    Visualized osseous structures show no acute abnormalities.  Degenerative changes of the cervical spine partially visualized.                                       Medications   aspirin tablet 325 mg (325 mg Oral Given 9/20/22 0911)     Medical Decision Making:   History:   Old Medical Records: I decided to obtain old medical records.  Initial Assessment:   Emergent evaluation of 61-year-old male presenting today with intermittent left-sided chest pain, left shoulder pain.  Vital signs stable  Well-appearing, no acute distress  Differential Diagnosis:   ACS, MI, costochondritis, pneumonia, pleural effusion  Independently Interpreted Test(s):   I have ordered and independently interpreted X-rays - see prior notes.  I have ordered and independently interpreted EKG Reading(s) - see prior notes  Clinical Tests:   Lab Tests: Reviewed and Ordered  Radiological Study: Ordered and Reviewed  Medical Tests: Ordered and Reviewed  ED Management:  - labs  - EKG  - CXR  Additional MDM:   Heart Score:    History:          Slightly suspicious.  ECG:             Normal  Age:               45-65 years  Risk factors: no risk factors known  Troponin:       Less than or equal to normal limit  Final Score: 1          ED Course as of 09/20/22 1029   Tue Sep 20, 2022   1024 Troponin I: <0.006 [GM]   1024 BUN: 11 [GM]   1024 Creatinine: 1.3 [GM]   1025 Hemoglobin: 15.0 [GM]   1025 CXR: no acute process   [GM]   1025 Patient re-evaluated, no active chest pain at this time.  This pain is reproducible on exam, low suspicion for cardiac etiology at  this time given negative workup.  Chest x-ray without acute process.  Recommend continue Tylenol/ibuprofen as needed as an outpatient for pain.  Return to emergency department if symptoms get worse.  Follow-up with PCP for further outpatient testing. [GM]      ED Course User Index  [GM] Areli Acosta MD                 Clinical Impression:   Final diagnoses:  [R07.9] Chest pain               Areli Acosta MD  09/20/22 1028       Areli Acosta MD  09/20/22 1030

## 2022-09-20 NOTE — Clinical Note
"Darshan Rudolph" Aidee was seen and treated in our emergency department on 9/20/2022.  He may return to work on 09/21/2022.       If you have any questions or concerns, please don't hesitate to call.      DR ALEXIS Acosta/ BETHANIE Gomez    "

## 2022-09-20 NOTE — ED NOTES
Patient identifiers verified and correct for Darshan Hoskins  LOC: The patient is awake, alert and aware of environment with an appropriate affect, the patient is oriented x 3 and speaking appropriately.   APPEARANCE: Patient appears comfortable and in no acute distress, patient is clean and well groomed.  SKIN: The skin is warm and dry, color consistent with ethnicity, patient has normal skin turgor and moist mucus membranes, skin intact, no breakdown or bruising noted.   MUSCULOSKELETAL: Patient moving all extremities spontaneously, no swelling noted.  RESPIRATORY: Airway is open and patent, respirations are spontaneous, patient has a normal effort and rate, no accessory muscle use noted, pt placed on continuous pulse ox with O2 sats noted at 97% on room air.  CARDIAC: Pt placed on cardiac monitor. Patient has a normal rate and regular rhythm, no edema noted, capillary refill < 3 seconds.   GASTRO: Soft and non tender to palpation, no distention noted, normoactive bowel sounds present in all four quadrants. Pt states bowel movements have been regular.  : Pt denies any pain or frequency with urination.  NEURO: Pt opens eyes spontaneously, behavior appropriate to situation, follows commands, facial expression symmetrical, bilateral hand grasp equal and even, purposeful motor response noted, normal sensation in all extremities when touched with a finger. Pt reports dizziness x1 week.

## 2022-09-24 ENCOUNTER — HOSPITAL ENCOUNTER (INPATIENT)
Facility: HOSPITAL | Age: 62
LOS: 5 days | Discharge: HOME | DRG: 872 | End: 2022-09-29
Attending: EMERGENCY MEDICINE | Admitting: HOSPITALIST
Payer: MEDICAID

## 2022-09-24 DIAGNOSIS — R53.1 WEAKNESS: ICD-10-CM

## 2022-09-24 DIAGNOSIS — A41.9 SEPSIS, DUE TO UNSPECIFIED ORGANISM, UNSPECIFIED WHETHER ACUTE ORGAN DYSFUNCTION PRESENT: Primary | ICD-10-CM

## 2022-09-24 DIAGNOSIS — N40.0 BENIGN PROSTATIC HYPERPLASIA, UNSPECIFIED WHETHER LOWER URINARY TRACT SYMPTOMS PRESENT: ICD-10-CM

## 2022-09-24 DIAGNOSIS — R07.9 CHEST PAIN: ICD-10-CM

## 2022-09-24 DIAGNOSIS — D72.819 LEUKOPENIA, UNSPECIFIED TYPE: ICD-10-CM

## 2022-09-24 DIAGNOSIS — N39.0 URINARY TRACT INFECTION WITH HEMATURIA, SITE UNSPECIFIED: ICD-10-CM

## 2022-09-24 DIAGNOSIS — N39.0 URINARY TRACT INFECTION WITHOUT HEMATURIA, SITE UNSPECIFIED: ICD-10-CM

## 2022-09-24 DIAGNOSIS — J44.9 CHRONIC OBSTRUCTIVE PULMONARY DISEASE, UNSPECIFIED COPD TYPE: ICD-10-CM

## 2022-09-24 DIAGNOSIS — R31.9 URINARY TRACT INFECTION WITH HEMATURIA, SITE UNSPECIFIED: ICD-10-CM

## 2022-09-24 PROBLEM — J44.1 COPD EXACERBATION: Status: ACTIVE | Noted: 2022-09-24

## 2022-09-24 LAB
ALBUMIN SERPL BCP-MCNC: 3.8 G/DL (ref 3.5–5.2)
ALP SERPL-CCNC: 91 U/L (ref 55–135)
ALT SERPL W/O P-5'-P-CCNC: 34 U/L (ref 10–44)
ANION GAP SERPL CALC-SCNC: 13 MMOL/L (ref 8–16)
ANISOCYTOSIS BLD QL SMEAR: SLIGHT
AST SERPL-CCNC: 45 U/L (ref 10–40)
BACTERIA #/AREA URNS AUTO: ABNORMAL /HPF
BASOPHILS # BLD AUTO: 0.01 K/UL (ref 0–0.2)
BASOPHILS NFR BLD: 0.3 % (ref 0–1.9)
BILIRUB SERPL-MCNC: 0.4 MG/DL (ref 0.1–1)
BILIRUB UR QL STRIP: NEGATIVE
BNP SERPL-MCNC: <10 PG/ML (ref 0–99)
BUN SERPL-MCNC: 17 MG/DL (ref 8–23)
CALCIUM SERPL-MCNC: 9.3 MG/DL (ref 8.7–10.5)
CHLORIDE SERPL-SCNC: 96 MMOL/L (ref 95–110)
CLARITY UR REFRACT.AUTO: CLEAR
CO2 SERPL-SCNC: 26 MMOL/L (ref 23–29)
COLOR UR AUTO: YELLOW
CREAT SERPL-MCNC: 1.6 MG/DL (ref 0.5–1.4)
DIFFERENTIAL METHOD: ABNORMAL
EOSINOPHIL # BLD AUTO: 0 K/UL (ref 0–0.5)
EOSINOPHIL NFR BLD: 0 % (ref 0–8)
ERYTHROCYTE [DISTWIDTH] IN BLOOD BY AUTOMATED COUNT: 13.2 % (ref 11.5–14.5)
EST. GFR  (NO RACE VARIABLE): 48.1 ML/MIN/1.73 M^2
GLUCOSE SERPL-MCNC: 103 MG/DL (ref 70–110)
GLUCOSE SERPL-MCNC: 96 MG/DL (ref 70–110)
GLUCOSE UR QL STRIP: NEGATIVE
HCT VFR BLD AUTO: 44.2 % (ref 40–54)
HGB BLD-MCNC: 14.7 G/DL (ref 14–18)
HGB UR QL STRIP: ABNORMAL
HYALINE CASTS UR QL AUTO: 0 /LPF
IMM GRANULOCYTES # BLD AUTO: 0 K/UL (ref 0–0.04)
IMM GRANULOCYTES NFR BLD AUTO: 0 % (ref 0–0.5)
INFLUENZA A, MOLECULAR: NEGATIVE
INFLUENZA B, MOLECULAR: NEGATIVE
KETONES UR QL STRIP: NEGATIVE
LACTATE SERPL-SCNC: 1 MMOL/L (ref 0.5–2.2)
LEUKOCYTE ESTERASE UR QL STRIP: ABNORMAL
LYMPHOCYTES # BLD AUTO: 0.9 K/UL (ref 1–4.8)
LYMPHOCYTES NFR BLD: 27.5 % (ref 18–48)
MCH RBC QN AUTO: 31.4 PG (ref 27–31)
MCHC RBC AUTO-ENTMCNC: 33.3 G/DL (ref 32–36)
MCV RBC AUTO: 94 FL (ref 82–98)
MICROSCOPIC COMMENT: ABNORMAL
MONOCYTES # BLD AUTO: 0.2 K/UL (ref 0.3–1)
MONOCYTES NFR BLD: 6 % (ref 4–15)
NEUTROPHILS # BLD AUTO: 2.2 K/UL (ref 1.8–7.7)
NEUTROPHILS NFR BLD: 66.2 % (ref 38–73)
NITRITE UR QL STRIP: NEGATIVE
NRBC BLD-RTO: 0 /100 WBC
PH UR STRIP: 6 [PH] (ref 5–8)
PLATELET # BLD AUTO: 154 K/UL (ref 150–450)
PLATELET BLD QL SMEAR: ABNORMAL
PMV BLD AUTO: 9.7 FL (ref 9.2–12.9)
POCT GLUCOSE: 103 MG/DL (ref 70–110)
POTASSIUM SERPL-SCNC: 4.2 MMOL/L (ref 3.5–5.1)
PROT SERPL-MCNC: 8 G/DL (ref 6–8.4)
PROT UR QL STRIP: ABNORMAL
RBC # BLD AUTO: 4.68 M/UL (ref 4.6–6.2)
RBC #/AREA URNS AUTO: 3 /HPF (ref 0–4)
SARS-COV-2 RDRP RESP QL NAA+PROBE: NEGATIVE
SODIUM SERPL-SCNC: 135 MMOL/L (ref 136–145)
SP GR UR STRIP: >1.03 (ref 1–1.03)
SPECIMEN SOURCE: NORMAL
SQUAMOUS #/AREA URNS AUTO: 1 /HPF
TROPONIN I SERPL DL<=0.01 NG/ML-MCNC: <0.006 NG/ML (ref 0–0.03)
URN SPEC COLLECT METH UR: ABNORMAL
WBC # BLD AUTO: 3.31 K/UL (ref 3.9–12.7)
WBC #/AREA URNS AUTO: 82 /HPF (ref 0–5)

## 2022-09-24 PROCEDURE — 96366 THER/PROPH/DIAG IV INF ADDON: CPT

## 2022-09-24 PROCEDURE — 85025 COMPLETE CBC W/AUTO DIFF WBC: CPT | Performed by: EMERGENCY MEDICINE

## 2022-09-24 PROCEDURE — 84484 ASSAY OF TROPONIN QUANT: CPT | Performed by: EMERGENCY MEDICINE

## 2022-09-24 PROCEDURE — 87040 BLOOD CULTURE FOR BACTERIA: CPT | Mod: 59 | Performed by: EMERGENCY MEDICINE

## 2022-09-24 PROCEDURE — 87502 INFLUENZA DNA AMP PROBE: CPT | Performed by: EMERGENCY MEDICINE

## 2022-09-24 PROCEDURE — 87186 SC STD MICRODIL/AGAR DIL: CPT | Performed by: EMERGENCY MEDICINE

## 2022-09-24 PROCEDURE — 93005 ELECTROCARDIOGRAM TRACING: CPT

## 2022-09-24 PROCEDURE — 63600175 PHARM REV CODE 636 W HCPCS: Performed by: EMERGENCY MEDICINE

## 2022-09-24 PROCEDURE — 99285 PR EMERGENCY DEPT VISIT,LEVEL V: ICD-10-PCS | Mod: CS,,, | Performed by: EMERGENCY MEDICINE

## 2022-09-24 PROCEDURE — 80053 COMPREHEN METABOLIC PANEL: CPT | Performed by: EMERGENCY MEDICINE

## 2022-09-24 PROCEDURE — 99223 1ST HOSP IP/OBS HIGH 75: CPT | Mod: ,,, | Performed by: FAMILY MEDICINE

## 2022-09-24 PROCEDURE — 93010 EKG 12-LEAD: ICD-10-PCS | Mod: ,,, | Performed by: INTERNAL MEDICINE

## 2022-09-24 PROCEDURE — 96365 THER/PROPH/DIAG IV INF INIT: CPT

## 2022-09-24 PROCEDURE — 99285 EMERGENCY DEPT VISIT HI MDM: CPT | Mod: CS,,, | Performed by: EMERGENCY MEDICINE

## 2022-09-24 PROCEDURE — 93010 ELECTROCARDIOGRAM REPORT: CPT | Mod: ,,, | Performed by: INTERNAL MEDICINE

## 2022-09-24 PROCEDURE — 94761 N-INVAS EAR/PLS OXIMETRY MLT: CPT

## 2022-09-24 PROCEDURE — 25500020 PHARM REV CODE 255: Performed by: EMERGENCY MEDICINE

## 2022-09-24 PROCEDURE — 82962 GLUCOSE BLOOD TEST: CPT

## 2022-09-24 PROCEDURE — 99223 PR INITIAL HOSPITAL CARE,LEVL III: ICD-10-PCS | Mod: ,,, | Performed by: FAMILY MEDICINE

## 2022-09-24 PROCEDURE — 87086 URINE CULTURE/COLONY COUNT: CPT | Performed by: EMERGENCY MEDICINE

## 2022-09-24 PROCEDURE — 25000003 PHARM REV CODE 250: Performed by: EMERGENCY MEDICINE

## 2022-09-24 PROCEDURE — 87077 CULTURE AEROBIC IDENTIFY: CPT | Performed by: EMERGENCY MEDICINE

## 2022-09-24 PROCEDURE — 11000001 HC ACUTE MED/SURG PRIVATE ROOM

## 2022-09-24 PROCEDURE — 81001 URINALYSIS AUTO W/SCOPE: CPT | Performed by: EMERGENCY MEDICINE

## 2022-09-24 PROCEDURE — U0002 COVID-19 LAB TEST NON-CDC: HCPCS | Performed by: EMERGENCY MEDICINE

## 2022-09-24 PROCEDURE — 83880 ASSAY OF NATRIURETIC PEPTIDE: CPT | Performed by: EMERGENCY MEDICINE

## 2022-09-24 PROCEDURE — 83605 ASSAY OF LACTIC ACID: CPT | Performed by: EMERGENCY MEDICINE

## 2022-09-24 PROCEDURE — 96367 TX/PROPH/DG ADDL SEQ IV INF: CPT

## 2022-09-24 PROCEDURE — 87088 URINE BACTERIA CULTURE: CPT | Performed by: EMERGENCY MEDICINE

## 2022-09-24 PROCEDURE — 99285 EMERGENCY DEPT VISIT HI MDM: CPT | Mod: 25

## 2022-09-24 PROCEDURE — 87502 INFLUENZA DNA AMP PROBE: CPT

## 2022-09-24 RX ORDER — VANCOMYCIN HCL IN 5 % DEXTROSE 1G/250ML
1000 PLASTIC BAG, INJECTION (ML) INTRAVENOUS
Status: COMPLETED | OUTPATIENT
Start: 2022-09-24 | End: 2022-09-24

## 2022-09-24 RX ORDER — IPRATROPIUM BROMIDE AND ALBUTEROL SULFATE 2.5; .5 MG/3ML; MG/3ML
3 SOLUTION RESPIRATORY (INHALATION) EVERY 6 HOURS PRN
Status: DISCONTINUED | OUTPATIENT
Start: 2022-09-24 | End: 2022-09-29 | Stop reason: HOSPADM

## 2022-09-24 RX ORDER — TAMSULOSIN HYDROCHLORIDE 0.4 MG/1
0.4 CAPSULE ORAL DAILY
Status: DISCONTINUED | OUTPATIENT
Start: 2022-09-25 | End: 2022-09-29 | Stop reason: HOSPADM

## 2022-09-24 RX ORDER — NALOXONE HCL 0.4 MG/ML
0.02 VIAL (ML) INJECTION
Status: DISCONTINUED | OUTPATIENT
Start: 2022-09-24 | End: 2022-09-29 | Stop reason: HOSPADM

## 2022-09-24 RX ORDER — TALC
6 POWDER (GRAM) TOPICAL NIGHTLY PRN
Status: DISCONTINUED | OUTPATIENT
Start: 2022-09-24 | End: 2022-09-29 | Stop reason: HOSPADM

## 2022-09-24 RX ORDER — GLUCAGON 1 MG
1 KIT INJECTION
Status: DISCONTINUED | OUTPATIENT
Start: 2022-09-24 | End: 2022-09-29 | Stop reason: HOSPADM

## 2022-09-24 RX ORDER — FLUTICASONE FUROATE AND VILANTEROL 100; 25 UG/1; UG/1
1 POWDER RESPIRATORY (INHALATION) DAILY
Status: DISCONTINUED | OUTPATIENT
Start: 2022-09-25 | End: 2022-09-29 | Stop reason: HOSPADM

## 2022-09-24 RX ORDER — ACETAMINOPHEN 500 MG
1000 TABLET ORAL EVERY 8 HOURS PRN
Status: DISCONTINUED | OUTPATIENT
Start: 2022-09-24 | End: 2022-09-29 | Stop reason: HOSPADM

## 2022-09-24 RX ORDER — SODIUM CHLORIDE 0.9 % (FLUSH) 0.9 %
10 SYRINGE (ML) INJECTION EVERY 12 HOURS PRN
Status: DISCONTINUED | OUTPATIENT
Start: 2022-09-24 | End: 2022-09-29 | Stop reason: HOSPADM

## 2022-09-24 RX ORDER — PANTOPRAZOLE SODIUM 40 MG/1
40 TABLET, DELAYED RELEASE ORAL DAILY
Status: DISCONTINUED | OUTPATIENT
Start: 2022-09-25 | End: 2022-09-29 | Stop reason: HOSPADM

## 2022-09-24 RX ORDER — IBUPROFEN 200 MG
24 TABLET ORAL
Status: DISCONTINUED | OUTPATIENT
Start: 2022-09-24 | End: 2022-09-29 | Stop reason: HOSPADM

## 2022-09-24 RX ORDER — MAG HYDROX/ALUMINUM HYD/SIMETH 200-200-20
30 SUSPENSION, ORAL (FINAL DOSE FORM) ORAL 4 TIMES DAILY PRN
Status: DISCONTINUED | OUTPATIENT
Start: 2022-09-24 | End: 2022-09-29 | Stop reason: HOSPADM

## 2022-09-24 RX ORDER — ONDANSETRON 2 MG/ML
4 INJECTION INTRAMUSCULAR; INTRAVENOUS EVERY 8 HOURS PRN
Status: DISCONTINUED | OUTPATIENT
Start: 2022-09-24 | End: 2022-09-29 | Stop reason: HOSPADM

## 2022-09-24 RX ORDER — IBUPROFEN 200 MG
16 TABLET ORAL
Status: DISCONTINUED | OUTPATIENT
Start: 2022-09-24 | End: 2022-09-29 | Stop reason: HOSPADM

## 2022-09-24 RX ADMIN — VANCOMYCIN HYDROCHLORIDE 1000 MG: 1 INJECTION, POWDER, LYOPHILIZED, FOR SOLUTION INTRAVENOUS at 07:09

## 2022-09-24 RX ADMIN — PIPERACILLIN SODIUM AND TAZOBACTAM SODIUM 4.5 G: 4; .5 INJECTION, POWDER, LYOPHILIZED, FOR SOLUTION INTRAVENOUS at 04:09

## 2022-09-24 RX ADMIN — IOHEXOL 75 ML: 350 INJECTION, SOLUTION INTRAVENOUS at 05:09

## 2022-09-24 RX ADMIN — SODIUM CHLORIDE, SODIUM LACTATE, POTASSIUM CHLORIDE, AND CALCIUM CHLORIDE 1878 ML: .6; .31; .03; .02 INJECTION, SOLUTION INTRAVENOUS at 04:09

## 2022-09-24 NOTE — ED PROVIDER NOTES
"Encounter Date: 9/24/2022       History     Chief Complaint   Patient presents with    Multiple complaints     Been sitting at [employer] for 4 hrs, dry mouth, urinating a lot, feeling dehydrated and dizzy     This is a 60 yo male with past medical hx of BPH and COPD (amidst other comorbidities) who has not been feeling well over the course of the past approximate 5 days. He says that he has been feeling chilled. He has no appetite. He feels generally weak. He is a difficult informant, but other than the generalized complaints above, he has had the following:    He has had a headache. No kourtney neck stiffness. He feels what he says is "dizziness" but I feel like he is describing more of a lightheaded sensation particularly when he is up and about.   No chest pain or shortness of breath (although he was here a few days ago for chest pain with a reassuring workup, he states those symptoms are gone).   He has not had any abdominal pain, vomiting, or diarrhea. But does have loss of appetite as above.   No dysuria or worsened frequency above his baseline.   He does state that he feels dehydrated. His mouth has been dry.    Review of patient's allergies indicates:  No Known Allergies  Past Medical History:   Diagnosis Date    Anemia     Anxiety     Arthritis     Back pain     Carpal tunnel syndrome     GERD (gastroesophageal reflux disease)     Stomach ulcer     "bleeding"     No past surgical history on file.  Family History   Problem Relation Age of Onset    No Known Problems Mother     No Known Problems Father      Social History     Tobacco Use    Smoking status: Every Day     Packs/day: 1.00     Years: 25.00     Pack years: 25.00     Types: Cigarettes    Smokeless tobacco: Never   Substance Use Topics    Alcohol use: No    Drug use: No     Review of Systems   Constitutional:  Positive for chills and fever.   HENT:  Negative for congestion, rhinorrhea and sore throat.    Eyes:  Negative for discharge and visual disturbance. "   Respiratory:  Negative for cough, chest tightness and shortness of breath.    Cardiovascular:  Negative for chest pain.   Genitourinary:  Positive for frequency (at baseline). Negative for dysuria and flank pain.   Musculoskeletal:  Negative for arthralgias and myalgias.   Allergic/Immunologic: Negative for immunocompromised state.   Neurological:  Positive for light-headedness and headaches. Negative for syncope, speech difficulty and numbness.   Hematological:  Does not bruise/bleed easily.   Psychiatric/Behavioral:  Negative for confusion.      Physical Exam     Initial Vitals [09/24/22 1541]   BP Pulse Resp Temp SpO2   107/73 (!) 118 20 (!) 102.6 °F (39.2 °C) 97 %      MAP       --         Physical Exam    Nursing note and vitals reviewed.  Constitutional: He appears well-developed. No distress.   Thin. Difficult informant (although he is AA and O x 4).    HENT:   Head: Normocephalic and atraumatic.   Mouth/Throat: No oropharyngeal exudate.   Op dry. No lesions.   Eyes: EOM are normal. Pupils are equal, round, and reactive to light.   Neck: Neck supple.   No meningitis.    Normal range of motion.  Cardiovascular:  Regular rhythm, normal heart sounds and intact distal pulses.     Exam reveals no gallop and no friction rub.       No murmur heard.  Tachycardic rate.    Pulmonary/Chest: Breath sounds normal. No respiratory distress. He has no wheezes. He has no rhonchi. He has no rales.   Abdominal: Abdomen is soft. Bowel sounds are normal. He exhibits no distension. There is no abdominal tenderness. There is no rebound and no guarding.   Musculoskeletal:         General: No edema. Normal range of motion.      Cervical back: Normal range of motion and neck supple.     Neurological: He is alert and oriented to person, place, and time. He has normal strength. No cranial nerve deficit or sensory deficit. GCS score is 15. GCS eye subscore is 4. GCS verbal subscore is 5. GCS motor subscore is 6.   Skin: Skin is warm  and dry. Capillary refill takes less than 2 seconds.     ED Course   Procedures  Labs Reviewed   CBC W/ AUTO DIFFERENTIAL - Abnormal; Notable for the following components:       Result Value    WBC 3.31 (*)     MCH 31.4 (*)     Lymph # 0.9 (*)     Mono # 0.2 (*)     All other components within normal limits   COMPREHENSIVE METABOLIC PANEL - Abnormal; Notable for the following components:    Sodium 135 (*)     Creatinine 1.6 (*)     AST 45 (*)     eGFR 48.1 (*)     All other components within normal limits   URINALYSIS, REFLEX TO URINE CULTURE - Abnormal; Notable for the following components:    Specific Gravity, UA >1.030 (*)     Protein, UA 1+ (*)     Occult Blood UA 1+ (*)     Leukocytes, UA 3+ (*)     All other components within normal limits    Narrative:     Specimen Source->Urine   URINALYSIS MICROSCOPIC - Abnormal; Notable for the following components:    WBC, UA 82 (*)     All other components within normal limits    Narrative:     Specimen Source->Urine   INFLUENZA A & B BY MOLECULAR   LACTIC ACID, PLASMA   TROPONIN I   B-TYPE NATRIURETIC PEPTIDE   SARS-COV-2 RNA AMPLIFICATION, QUAL   POCT GLUCOSE   POCT GLUCOSE MONITORING CONTINUOUS        ECG Results              EKG 12-lead (Final result)  Result time 09/25/22 16:25:01      Final result by Interface, Lab In [facility] (09/25/22 16:25:01)                   Narrative:    Test Reason : AMS    Vent. Rate : 099 BPM     Atrial Rate : 099 BPM     P-R Int : 142 ms          QRS Dur : 068 ms      QT Int : 318 ms       P-R-T Axes : 063 000 061 degrees     QTc Int : 408 ms    Normal sinus rhythm  Normal ECG  When compared with ECG of 20-SEP-2022 08:56,  Questionable change in The axis  Confirmed by Salazar MARSHALL MD ([id number]) on 9/25/2022 4:24:54 PM    Referred By: [email or web address]   SELF           Confirmed By:Salazar MARSHALL MD                                  Imaging Results              CTA Head and Neck (xpd) (Final result)  Result time 09/24/22 18:20:57      Final result by Austyn  AIDE Aguirre MD (09/24/22 18:20:57)                   Impression:      1. No acute intracranial findings.  2. CTA head and neck without large vessel occlusion or high-grade stenosis.  3. Centrilobular emphysema with biapical bullous disease.  Pleural based irregular opacity in the right upper lobe, possible subsegmental atelectasis or fibrotic change.  Recommend non-emergent dedicated noncontrast CT chest.  4. Additional findings as above.    Electronically signed by resident: Shaun Carlos  Date:    09/24/2022  Time:    17:40    Electronically signed by: Austyn Aguirre MD  Date:    09/24/2022  Time:    18:20               Narrative:    EXAMINATION:  CTA HEAD AND NECK (XPD)    CLINICAL HISTORY:  Vertigo, central;    TECHNIQUE:  Axial CT images obtained throughout the region of the head before and after the administration of intravenous contrast.  CT angiogram was performed from through the cervical and intracranial vasculature during the IV bolus administration of 75mL of Omnipaque 350.  Multiplanar MPR and MIP reformats were performed.    CT source data was analyzed using artificial intelligence software for detection of a large vessel occlusion (LVO) or hemorrhage in order to enable computer assisted triage notification and aid clinical stroke decision making.    COMPARISON:  None    FINDINGS:  The ventricles are normal in size without evidence of hydrocephalus.    Age-related mild generalized cerebral volume loss.  No parenchymal mass, hemorrhage, edema or major vascular distribution infarct.    No extra-axial blood or fluid collections.    No acute displaced calvarial fracture.  Significant mucosal thickening in the right maxillary antrum.  Mastoid air cells clear.      CTA:    The aortic arch maintains a normal branching pattern.    The common and internal carotid arteries are normal in course and caliber. No significant stenosis in either carotid bifurcation per NASCET criteria.  Mild calcified plaque in the  intracranial left internal carotid artery.    The vertebral origins are patent. The cervical vertebral arteries are normal in course and caliber. Vertebrobasilar system is within normal limits without focal abnormality.    The anterior, middle, and posterior cerebral arteries are within normal limits, without evidence of significant stenosis, focal occlusion, or intracranial aneurysm formation.    Limited evaluation of the dural venous sinuses without evidence of thrombosis.    Additional findings: Centrilobular emphysema with biapical bullous disease.  1.7 cm pleural based irregular opacity in the right upper lobe (axial series 5, image 103), possible subsegmental atelectasis or fibrotic change.  Thyroid, parotid, and submandibular glands unremarkable.  Several scattered subcentimeter cervical and imaged upper mediastinal lymph nodes.                                       Medications   fluticasone furoate-vilanteroL 100-25 mcg/dose diskus inhaler 1 puff (1 puff Inhalation Given 9/26/22 0847)   pantoprazole EC tablet 40 mg (40 mg Oral Given 9/26/22 0930)   tamsulosin 24 hr capsule 0.4 mg (0.4 mg Oral Given 9/26/22 0930)   sodium chloride 0.9% flush 10 mL (has no administration in time range)   naloxone 0.4 mg/mL injection 0.02 mg (has no administration in time range)   glucose chewable tablet 16 g (has no administration in time range)   glucose chewable tablet 24 g (has no administration in time range)   glucagon (human recombinant) injection 1 mg (has no administration in time range)   acetaminophen tablet 1,000 mg (1,000 mg Oral Given 9/26/22 0111)   ondansetron injection 4 mg (has no administration in time range)   albuterol-ipratropium 2.5 mg-0.5 mg/3 mL nebulizer solution 3 mL (has no administration in time range)   melatonin tablet 6 mg (has no administration in time range)   aluminum-magnesium hydroxide-simethicone 200-200-20 mg/5 mL suspension 30 mL (has no administration in time range)   dextrose 10% bolus  125 mL (has no administration in time range)   dextrose 10% bolus 250 mL (has no administration in time range)   influenza (QUADRIVALENT PF) vaccine 0.5 mL (has no administration in time range)   cefTRIAXone (ROCEPHIN) 2 g/50 mL D5W IVPB (0 g Intravenous Stopped 9/26/22 1015)   lactated ringers bolus 1,878 mL (0 mLs Intravenous Stopped 9/24/22 1837)   vancomycin in dextrose 5 % 1 gram/250 mL IVPB 1,000 mg (0 mg Intravenous Stopped 9/24/22 2116)   piperacillin-tazobactam 4.5 g in sodium chloride 0.9% 100 mL IVPB (ready to mix system) (0 g Intravenous Stopped 9/24/22 1929)   iohexoL (OMNIPAQUE 350) injection 100 mL (75 mLs Intravenous Given 9/24/22 1725)   vancomycin 750 mg in dextrose 5 % 250 mL IVPB (ready to mix system) (0 mg Intravenous Stopped 9/25/22 1217)     Medical Decision Making:   History:   Old Medical Records: I decided to obtain old medical records.  Old Records Summarized: records from clinic visits and records from previous admission(s).       <> Summary of Records: Most recently here a few days ago with chest pain, reassuring workup, no further chest pain since.   Initial Assessment:   This is a 61-year-old male who presents to our emergency department today with fever and multiple generalized symptoms that have now been ongoing for approximately 5 days.  He is a difficult informant but relays loss of appetite, generalized weakness, and chills as well as symptoms of what he describes as dizziness but what sounds more to be lightheadedness.  He also has had a headache.  Differential Diagnosis:   He arrives to the emergency department today febrile in the 102 range.  He is also tachycardic.  Thus I do feel that there is an infectious cause that is likely leading to many of his symptoms.  We went ahead and initiated the sepsis protocol early in his stay and covered him with broad-spectrum antibiotics given his presenting findings.  I begin to search for a source of infection.  In addition, the patient  had been here just a few days ago with chest pain that he states is now resolved and he also has been relying this headache and dizzy type sensation.  Thus the plan is as follows:  - From a cardiac perspective, I think the likelihood of cardiac ischemia is low at this time.  Given he had had some recent chest pain and also has many of these nonspecific symptoms, I did order an EKG, independently reviewed and interpreted by me, that revealed sinus tachycardia with nonspecific ST and T-wave changes not concerning for acute ischemia or infarction.  I also have ordered a troponin.  - From an infectious perspective, I have sepsis protocol initiated.  Broad-spectrum antibiotics have been given.  Cultures have been drawn prior and lactate and CBC are currently pending as well.  He also has a COVID pending.  Chest x-ray is also currently pending.  We need to search for obvious sources of infection such as COVID, pneumonia, and urinary tract infection.  However, if we are unable to find a source in these most common places, we do need to take into consideration the headache that he has been having.  I went ahead and ordered head imaging as a CTA given his description of his symptoms to exclude any vascular causes as well but also to rule out any contraindication to LP in the event that we would need to proceed with workup for meningitis.  This is still fairly low on my differential, however I would pursue this if were unable to find a source of infection otherwise that may be leading to this fever.  - He also has an HIV pending from [facility] protocol, thus we can also rule this out.   He is in stable condition at time of sign out of care. We are pending a number of labs and imaging at this point. I feel the likely disposition of the patient will be admission. We need more data prior to doing so.   ED Diagnosis:  1. Acute sepsis, source unknown, with accompanying fever and tachycardia.                         Clinical  Impression:   Final diagnoses:  [R53.1] Weakness  [A41.9] Sepsis, due to unspecified organism, unspecified whether acute organ dysfunction present (Primary)  [N39.0, R31.9] Urinary tract infection with hematuria, site unspecified      ED Disposition Condition    Admit Stable                Nolvia Jameson MD  09/26/22 1122

## 2022-09-25 PROBLEM — N18.9 CKD (CHRONIC KIDNEY DISEASE): Status: ACTIVE | Noted: 2022-09-25

## 2022-09-25 PROBLEM — D72.819 LEUCOPENIA: Status: ACTIVE | Noted: 2022-09-25

## 2022-09-25 LAB
ALBUMIN SERPL BCP-MCNC: 3 G/DL (ref 3.5–5.2)
ALP SERPL-CCNC: 73 U/L (ref 55–135)
ALT SERPL W/O P-5'-P-CCNC: 26 U/L (ref 10–44)
ANION GAP SERPL CALC-SCNC: 8 MMOL/L (ref 8–16)
AST SERPL-CCNC: 37 U/L (ref 10–40)
BASOPHILS # BLD AUTO: 0.01 K/UL (ref 0–0.2)
BASOPHILS NFR BLD: 0.3 % (ref 0–1.9)
BILIRUB SERPL-MCNC: 0.3 MG/DL (ref 0.1–1)
BUN SERPL-MCNC: 18 MG/DL (ref 8–23)
CALCIUM SERPL-MCNC: 8.6 MG/DL (ref 8.7–10.5)
CHLORIDE SERPL-SCNC: 100 MMOL/L (ref 95–110)
CO2 SERPL-SCNC: 25 MMOL/L (ref 23–29)
CREAT SERPL-MCNC: 1.7 MG/DL (ref 0.5–1.4)
DIFFERENTIAL METHOD: ABNORMAL
EOSINOPHIL # BLD AUTO: 0 K/UL (ref 0–0.5)
EOSINOPHIL NFR BLD: 0 % (ref 0–8)
ERYTHROCYTE [DISTWIDTH] IN BLOOD BY AUTOMATED COUNT: 13.2 % (ref 11.5–14.5)
EST. GFR  (NO RACE VARIABLE): 44.7 ML/MIN/1.73 M^2
GLUCOSE SERPL-MCNC: 99 MG/DL (ref 70–110)
HCT VFR BLD AUTO: 40.2 % (ref 40–54)
HGB BLD-MCNC: 13.3 G/DL (ref 14–18)
IMM GRANULOCYTES # BLD AUTO: 0.01 K/UL (ref 0–0.04)
IMM GRANULOCYTES NFR BLD AUTO: 0.3 % (ref 0–0.5)
LYMPHOCYTES # BLD AUTO: 0.9 K/UL (ref 1–4.8)
LYMPHOCYTES NFR BLD: 29.7 % (ref 18–48)
MAGNESIUM SERPL-MCNC: 2 MG/DL (ref 1.6–2.6)
MCH RBC QN AUTO: 31.7 PG (ref 27–31)
MCHC RBC AUTO-ENTMCNC: 33.1 G/DL (ref 32–36)
MCV RBC AUTO: 96 FL (ref 82–98)
MONOCYTES # BLD AUTO: 0.2 K/UL (ref 0.3–1)
MONOCYTES NFR BLD: 5.9 % (ref 4–15)
NEUTROPHILS # BLD AUTO: 2 K/UL (ref 1.8–7.7)
NEUTROPHILS NFR BLD: 63.8 % (ref 38–73)
NRBC BLD-RTO: 0 /100 WBC
PLATELET # BLD AUTO: 151 K/UL (ref 150–450)
PMV BLD AUTO: 10.3 FL (ref 9.2–12.9)
POTASSIUM SERPL-SCNC: 4 MMOL/L (ref 3.5–5.1)
PROT SERPL-MCNC: 6.2 G/DL (ref 6–8.4)
RBC # BLD AUTO: 4.19 M/UL (ref 4.6–6.2)
SODIUM SERPL-SCNC: 133 MMOL/L (ref 136–145)
VANCOMYCIN SERPL-MCNC: 9 UG/ML
WBC # BLD AUTO: 3.06 K/UL (ref 3.9–12.7)

## 2022-09-25 PROCEDURE — 36415 COLL VENOUS BLD VENIPUNCTURE: CPT | Performed by: FAMILY MEDICINE

## 2022-09-25 PROCEDURE — 63600175 PHARM REV CODE 636 W HCPCS: Performed by: HOSPITALIST

## 2022-09-25 PROCEDURE — 80202 ASSAY OF VANCOMYCIN: CPT | Performed by: HOSPITALIST

## 2022-09-25 PROCEDURE — 94640 AIRWAY INHALATION TREATMENT: CPT

## 2022-09-25 PROCEDURE — 25000242 PHARM REV CODE 250 ALT 637 W/ HCPCS: Performed by: FAMILY MEDICINE

## 2022-09-25 PROCEDURE — 80053 COMPREHEN METABOLIC PANEL: CPT | Performed by: FAMILY MEDICINE

## 2022-09-25 PROCEDURE — 36415 COLL VENOUS BLD VENIPUNCTURE: CPT | Performed by: HOSPITALIST

## 2022-09-25 PROCEDURE — 83735 ASSAY OF MAGNESIUM: CPT | Performed by: FAMILY MEDICINE

## 2022-09-25 PROCEDURE — 25000003 PHARM REV CODE 250: Performed by: FAMILY MEDICINE

## 2022-09-25 PROCEDURE — 11000001 HC ACUTE MED/SURG PRIVATE ROOM

## 2022-09-25 PROCEDURE — 85025 COMPLETE CBC W/AUTO DIFF WBC: CPT | Performed by: FAMILY MEDICINE

## 2022-09-25 PROCEDURE — 63600175 PHARM REV CODE 636 W HCPCS: Performed by: FAMILY MEDICINE

## 2022-09-25 PROCEDURE — 25000003 PHARM REV CODE 250: Performed by: HOSPITALIST

## 2022-09-25 RX ADMIN — VANCOMYCIN HYDROCHLORIDE 750 MG: 750 INJECTION, POWDER, LYOPHILIZED, FOR SOLUTION INTRAVENOUS at 11:09

## 2022-09-25 RX ADMIN — FLUTICASONE FUROATE AND VILANTEROL TRIFENATATE 1 PUFF: 100; 25 POWDER RESPIRATORY (INHALATION) at 09:09

## 2022-09-25 RX ADMIN — PIPERACILLIN SODIUM AND TAZOBACTAM SODIUM 4.5 G: 4; .5 INJECTION, POWDER, LYOPHILIZED, FOR SOLUTION INTRAVENOUS at 12:09

## 2022-09-25 RX ADMIN — PANTOPRAZOLE SODIUM 40 MG: 40 TABLET, DELAYED RELEASE ORAL at 10:09

## 2022-09-25 RX ADMIN — ACETAMINOPHEN 1000 MG: 500 TABLET ORAL at 04:09

## 2022-09-25 RX ADMIN — TAMSULOSIN HYDROCHLORIDE 0.4 MG: 0.4 CAPSULE ORAL at 10:09

## 2022-09-25 RX ADMIN — CEFTRIAXONE 2 G: 2 INJECTION, SOLUTION INTRAVENOUS at 10:09

## 2022-09-25 NOTE — HPI
This is a 62yo male with a past medical history of BPH, COPD, and GERD who presents to the ED with chief complaint of decreased appetites, fatigue and pre-syncopal symptoms. Patient states that he was feeling well and in his regular state of health until 4-5 days ago when he started developing new symptoms of fatigue and decreased appetite. Denies nausea or vomiting. States that yesterday and today he started having dizziness with standing up and feeling weak with minimal ambulation. In the ED patient Temp 102.6, tachycardic, hemodynamically stable saturating well on room air. WBC 3.31, LA 1.0. UA consistent with UTI. Patient started on 30ml/kg IVF bolus and vanc and zosyn for urinary sepsis and admitted to the care of medicine for further evaluation and management.

## 2022-09-25 NOTE — ASSESSMENT & PLAN NOTE
- SIRS 3/4 with UTI source  - sp 30ml/kg LR bolus in ED  - continue vanc and zosyn  - follow up Urine and blood cultures  - strict I/Os  - monitor tele  - further management pending clinical course and future study review  9/25 fever of 102.6F yesterday.  sp 30ml/kg LR bolus. continue vanc and zosyn.  follow up Urine and blood cultures. COVID and flu negative. CX ray -No acute radiographic findings in the chest.. Zosyn discontinued. started on ceftriaxone

## 2022-09-25 NOTE — H&P
"[facility] Medicine  History & Physical    Patient Name: Darshan Hoskins  MRN: [id number]  Patient Class: IP- Inpatient  Admission Date: 9/24/2022  Attending Physician: Luis Soto MD   Primary Care Provider: [facility]         Patient information was obtained from patient, past medical records and ER records.     Subjective:     Principal Problem:Sepsis    Chief Complaint:   Chief Complaint   Patient presents with    Multiple complaints     Been sitting at [facility] for 4 hrs, dry mouth, urinating a lot, feeling dehydrated and dizzy        HPI: This is a 62yo male with a past medical history of BPH, COPD, and GERD who presents to the ED with chief complaint of decreased appetites, fatigue and pre-syncopal symptoms. Patient states that he was feeling well and in his regular state of health until 4-5 days ago when he started developing new symptoms of fatigue and decreased appetite. Denies nausea or vomiting. States that yesterday and today he started having dizziness with standing up and feeling weak with minimal ambulation. In the ED patient Temp 102.6, tachycardic, hemodynamically stable saturating well on room air. WBC 3.31, LA 1.0. UA consistent with UTI. Patient started on 30ml/kg IVF bolus and vanc and zosyn for urinary sepsis and admitted to the care of medicine for further evaluation and management.      Past Medical History:   Diagnosis Date    Anemia     Anxiety     Arthritis     Back pain     Carpal tunnel syndrome     GERD (gastroesophageal reflux disease)     Stomach ulcer     "bleeding"       No past surgical history on file.    Review of patient's allergies indicates:  No Known Allergies    No current facility-administered medications on file prior to encounter.     Current Outpatient Medications on File Prior to Encounter   Medication Sig    albuterol sulfate (VENTOLIN HFA INHL) Inhale into the lungs.    benzonatate (TESSALON) 200 MG capsule Take 1 " capsule (200 mg total) by mouth 3 (three) times daily as needed for Cough.    budesonide-formoterol 160-4.5 mcg (SYMBICORT) 160-4.5 mcg/actuation HFAA Inhale 2 puffs into the lungs every 12 (twelve) hours. Controller    doxazosin (CARDURA) 2 MG tablet Take 2 mg by mouth every evening.    esomeprazole (NEXIUM) 20 MG capsule Take 20 mg by mouth before breakfast.    methylPREDNISolone (MEDROL DOSEPACK) 4 mg tablet As directed.    naproxen sodium (ANAPROX) 220 MG tablet Take 220 mg by mouth every 12 (twelve) hours.    pantoprazole (PROTONIX) 40 MG tablet Take 40 mg by mouth once daily.    ranitidine (ZANTAC) 150 MG tablet Take 150 mg by mouth 2 (two) times daily.    tamsulosin (FLOMAX) 0.4 mg Cap Take 0.4 mg by mouth once daily.     Family History       Problem Relation (Age of Onset)    No Known Problems Mother, Father          Tobacco Use    Smoking status: Every Day     Packs/day: 1.00     Years: 25.00     Pack years: 25.00     Types: Cigarettes    Smokeless tobacco: Never   Substance and Sexual Activity    Alcohol use: No    Drug use: No    Sexual activity: Not Currently     Review of Systems   Constitutional:  Positive for appetite change, chills, diaphoresis and fatigue. Negative for fever.   HENT:  Negative for sore throat and trouble swallowing.    Eyes:  Negative for photophobia and visual disturbance.   Respiratory:  Negative for cough, shortness of breath and wheezing.    Cardiovascular:  Negative for chest pain, palpitations and leg swelling.   Gastrointestinal:  Negative for abdominal distention, abdominal pain, diarrhea, nausea and vomiting.   Genitourinary:  Negative for dysuria and hematuria.   Musculoskeletal:  Negative for neck pain and neck stiffness.   Skin:  Negative for rash and wound.   Neurological:  Positive for dizziness, weakness and light-headedness. Negative for seizures, syncope, numbness and headaches.   Psychiatric/Behavioral:  Negative for confusion and decreased  concentration.    Objective:     Vital Signs (Most Recent):  Temp: 97.8 °F (36.6 °C) (09/24/22 2210)  Pulse: 69 (09/24/22 2309)  Resp: 18 (09/24/22 2210)  BP: 112/69 (09/24/22 2210)  SpO2: 95 % (09/24/22 2210) Vital Signs (24h Range):  Temp:  [97.8 °F (36.6 °C)-102.6 °F (39.2 °C)] 97.8 °F (36.6 °C)  Pulse:  [] 69  Resp:  [16-20] 18  SpO2:  [94 %-98 %] 95 %  BP: (107-112)/(57-73) 112/69     Weight: 62.6 kg (138 lb)  Body mass index is 23.32 kg/m².    Physical Exam  Constitutional:       General: He is not in acute distress.     Appearance: He is diaphoretic. He is not toxic-appearing.   HENT:      Head: Normocephalic and atraumatic.      Nose: Nose normal.   Eyes:      General: No scleral icterus.     Extraocular Movements: Extraocular movements intact.      Pupils: Pupils are equal, round, and reactive to light.   Cardiovascular:      Rate and Rhythm: Regular rhythm. Tachycardia present.      Heart sounds: No murmur heard.  Pulmonary:      Effort: Pulmonary effort is normal. No respiratory distress.      Breath sounds: No wheezing or rales.   Abdominal:      General: Abdomen is flat. There is no distension.      Palpations: Abdomen is soft.      Tenderness: There is no abdominal tenderness. There is no guarding.   Musculoskeletal:         General: Normal range of motion.      Cervical back: Normal range of motion and neck supple. No rigidity.      Right lower leg: No edema.      Left lower leg: No edema.   Skin:     General: Skin is warm.      Coloration: Skin is not jaundiced or pale.   Neurological:      General: No focal deficit present.      Mental Status: He is alert and oriented to person, place, and time.   Psychiatric:         Mood and Affect: Mood normal.         Behavior: Behavior normal.         CRANIAL NERVES     CN III, IV, VI   Pupils are equal, round, and reactive to light.     Significant Labs: All pertinent labs within the past 24 hours have been reviewed.  CBC:   Recent Labs   Lab  09/24/22  1645   WBC 3.31*   HGB 14.7   HCT 44.2        CMP:   Recent Labs   Lab 09/24/22  1645   *   K 4.2   CL 96   CO2 26   GLU 96   BUN 17   CREATININE 1.6*   CALCIUM 9.3   PROT 8.0   ALBUMIN 3.8   BILITOT 0.4   ALKPHOS 91   AST 45*   ALT 34   ANIONGAP 13       Significant Imaging: I have reviewed all pertinent imaging results/findings within the past 24 hours.    Assessment/Plan:     * Sepsis  - SIRS 3/4 with UTI source  - sp 30ml/kg LR bolus in ED  - continue vanc and zosyn  - follow up Urine and blood cultures  - strict I/Os  - monitor tele  - further management pending clinical course and future study review    COPD (chronic obstructive pulmonary disease)  - continue home LABA-ICS  - duonebs prn      BPH (benign prostatic hyperplasia)  - continue home tamsulosin       UTI (urinary tract infection)  - see sepsis above        VTE Risk Mitigation (From admission, onward)         Ordered     IP VTE LOW RISK PATIENT  Once         09/24/22 2100     Place sequential compression device  Until discontinued         09/24/22 2100                   Austyn Tucker MD  Department of Hospital Medicine   [facility]

## 2022-09-25 NOTE — ASSESSMENT & PLAN NOTE
- SIRS 3/4 with UTI source  - sp 30ml/kg LR bolus in ED  - continue vanc and zosyn  - follow up Urine and blood cultures  - strict I/Os  - monitor tele  - further management pending clinical course and future study review

## 2022-09-25 NOTE — PROGRESS NOTES
[facility] Medicine  Progress Note    Patient Name: Darshan Hoskins  MRN: [id number]  Patient Class: IP- Inpatient   Admission Date: 9/24/2022  Length of Stay: 1 days  Attending Physician: Luis Soto MD  Primary Care Provider: [facility]        Subjective:     Principal Problem:Sepsis        HPI:  This is a 62yo male with a past medical history of BPH, COPD, and GERD who presents to the ED with chief complaint of decreased appetites, fatigue and pre-syncopal symptoms. Patient states that he was feeling well and in his regular state of health until 4-5 days ago when he started developing new symptoms of fatigue and decreased appetite. Denies nausea or vomiting. States that yesterday and today he started having dizziness with standing up and feeling weak with minimal ambulation. In the ED patient Temp 102.6, tachycardic, hemodynamically stable saturating well on room air. WBC 3.31, LA 1.0. UA consistent with UTI. Patient started on 30ml/kg IVF bolus and vanc and zosyn for urinary sepsis and admitted to the [facility] of medicine for further evaluation and management.      Overview/Hospital Course:  9/25 fever of 102.6F yesterday.  sp 30ml/kg LR bolus. continue vanc and zosyn.  follow up Urine and blood cultures. COVID and flu negative. CX ray -No acute radiographic findings in the chest.. Zosyn discontinued. statted on ceftriaxone . orthostatics negative                Review of Systems:   Pain scale:    Constitutional:  fever,  chills, headache, vision loss, hearing loss, weight loss, Generalized weakness, falls, loss of smell, loss of taste, poor appetite,  sore throat  Respiratory: cough, shortness of breath.   Cardiovascular: chest pain, dizziness -resolved , palpitations, orthopnea, swelling of feet, syncope  Gastrointestinal: nausea, vomiting, abdominal pain, diarrhea, black stool,  blood in stool, change in bowel habits  Genitourinary: hematuria, dysuria, urgency,  frequency  Integument/Breast: rash,  pruritis  Hematologic/Lymphatic: easy bruising, lymphadenopathy  Musculoskeletal: arthralgias , myalgias, back pain, neck pain, knee pain  Neurological: confusion, seizures, tremors, slurred speech  Behavioral/Psych:  depression, anxiety, auditory or visual hallucinations     OBJECTIVE:     Physical Exam:  Body mass index is 22.28 kg/m².    Constitutional: Appears well-developed and well-nourished.   Head: Normocephalic and atraumatic.   Neck: Normal range of motion. Neck supple.   Cardiovascular: Normal heart rate.  Regular heart rhythm.  Pulmonary/Chest: Effort normal.   Abdominal: No distension.  No tenderness  Musculoskeletal: Normal range of motion. No edema.   Neurological: Alert and oriented to person, place, and time.   Skin: Skin is warm and dry.   Psychiatric: Normal mood and affect. Behavior is normal.                  Vital Signs  Temp: 99.8 °F (37.7 °C) (09/25/22 1153)  Pulse: 98 (09/25/22 1154)  Resp: 18 (09/25/22 1153)  BP: 109/70 (09/25/22 1154)  SpO2: (Abnormal) 94 % (09/25/22 1153)     24 Hour VS Range    Temp:  [97.8 °F (36.6 °C)-102.6 °F (39.2 °C)]   Pulse:  []   Resp:  [16-20]   BP: (104-130)/(57-76)   SpO2:  [93 %-98 %]     Intake/Output Summary (Last 24 hours) at 9/25/2022 1441  Last data filed at 9/24/2022 1837  Gross per 24 hour   Intake 1878 ml   Output no documentation   Net 1878 ml         I/O This Shift:  No intake/output data recorded.    Wt Readings from Last 3 Encounters:   09/25/22 59.8 kg (131 lb 13.4 oz)   09/20/22 60.8 kg (134 lb 0.6 oz)   02/05/21 60.8 kg (134 lb)       I have personally reviewed the vitals and recorded Intake/Output     Laboratory/Diagnostic Data:    CBC/Anemia Labs: Coags:    Recent Labs   Lab 09/20/22  0851 09/24/22  1645 09/25/22  0338   WBC 4.84 3.31* 3.06*   HGB 15.0 14.7 13.3*   HCT 44.1 44.2 40.2    154 151   MCV 94 94 96   RDW 13.5 13.2 13.2    No results for input(s): PT, INR, APTT in the last 168 hours.      Chemistries: ABG:   Recent Labs   Lab 09/20/22  0851 09/24/22  1645 09/25/22  0337    135* 133*   K 4.2 4.2 4.0    96 100   CO2 29 26 25   BUN 11 17 18   CREATININE 1.3 1.6* 1.7*   CALCIUM 9.1 9.3 8.6*   PROT 7.4 8.0 6.2   BILITOT 0.2 0.4 0.3   ALKPHOS 102 91 73   ALT 24 34 26   AST 33 45* 37   MG  --   --  2.0    No results for input(s): PH, PCO2, PO2, HCO3, POCSATURATED, BE in the last 168 hours.     POCT Glucose: HbA1c:    Recent Labs   Lab 09/24/22  1622   POCTGLUCOSE 103    No results found for: HGBA1C     Cardiac Enzymes: Ejection Fractions:    Recent Labs     09/24/22  1645   TROPONINI <0.006    No results found for: EF       Recent Labs   Lab 09/24/22  1940   COLORU Yellow   APPEARANCEUA Clear   PHUR 6.0   SPECGRAV >1.030*   PROTEINUA 1+*   GLUCUA Negative   KETONESU Negative   BILIRUBINUA Negative   OCCULTUA 1+*   NITRITE Negative   LEUKOCYTESUR 3+*   RBCUA 3   WBCUA 82*   BACTERIA None   SQUAMEPITHEL 1   HYALINECASTS 0       Lactate (Lactic Acid) (mmol/L)   Date Value   09/24/2022 1.0     BNP (pg/mL)   Date Value   09/24/2022 <10     No results found for: CRP, SEDRATE  No results found for: DDIMER  No results found for: FERRITIN  No results found for: LDH  Troponin I (ng/mL)   Date Value   09/24/2022 <0.006   09/20/2022 <0.006     CPK (U/L)   Date Value   06/20/2006 106     No results found for this or any previous visit.  SARS-CoV-2 RNA, Amplification, Qual (no units)   Date Value   09/24/2022 Negative   09/20/2022 Negative     POC Rapid COVID (no units)   Date Value   02/05/2021 Negative       Microbiology labs for the last week  Microbiology Results (last 7 days)       Procedure Component Value Units Date/Time    Blood culture x two cultures. Draw prior to antibiotics. [[id number]] Collected: 09/24/22 1646    Order Status: Completed Specimen: Blood from Peripheral, Upper Arm, Left Updated: 09/25/22 0115     Blood Culture, Routine No Growth to date    Narrative:      Aerobic and anaerobic     Blood culture x two cultures. Draw prior to antibiotics. [[id number]] Collected: 09/24/22 1645    Order Status: Completed Specimen: Blood from Peripheral, Antecubital, Left Updated: 09/25/22 0115     Blood Culture, Routine No Growth to date    Narrative:      Aerobic and anaerobic    Urine culture [[id number]] Collected: 09/24/22 1940    Order Status: No result Specimen: Urine Updated: 09/24/22 1959    Influenza A & B by Molecular [[id number]] Collected: 09/24/22 1657    Order Status: Completed Specimen: Nasopharyngeal Swab Updated: 09/24/22 1804     Influenza A, Molecular Negative     Influenza B, Molecular Negative     Flu A & B Source Nasal swab            Reviewed and noted in plan where applicable- Please see chart for full lab data.    Lines/Drains:       Peripheral IV - Single Lumen 09/24/22 1639 20 G Left Antecubital (Active)   Site Assessment Clean;Dry;Intact 09/25/22 0400   Line Status Flushed;Saline locked 09/25/22 0400   Dressing Status Intact;Dry;Clean 09/25/22 0400   Dressing Intervention Integrity maintained 09/25/22 0400   Number of days: 0       Imaging      No results found for this or any previous visit.      CTA Head and Neck (xpd)  Narrative: EXAMINATION:  CTA HEAD AND NECK (XPD)    CLINICAL HISTORY:  Vertigo, central;    TECHNIQUE:  Axial CT images obtained throughout the region of the head before and after the administration of intravenous contrast.  CT angiogram was performed from through the cervical and intracranial vasculature during the IV bolus administration of 75mL of Omnipaque 350.  Multiplanar MPR and MIP reformats were performed.    CT source data was analyzed using artificial intelligence software for detection of a large vessel occlusion (LVO) or hemorrhage in order to enable computer assisted triage notification and aid clinical stroke decision making.    COMPARISON:  None    FINDINGS:  The ventricles are normal in size without evidence of hydrocephalus.    Age-related mild  generalized cerebral volume loss.  No parenchymal mass, hemorrhage, edema or major vascular distribution infarct.    No extra-axial blood or fluid collections.    No acute displaced calvarial fracture.  Significant mucosal thickening in the right maxillary antrum.  Mastoid air cells clear.    CTA:    The aortic arch maintains a normal branching pattern.    The common and internal carotid arteries are normal in course and caliber. No significant stenosis in either carotid bifurcation per NASCET criteria.  Mild calcified plaque in the intracranial left internal carotid artery.    The vertebral origins are patent. The cervical vertebral arteries are normal in course and caliber. Vertebrobasilar system is within normal limits without focal abnormality.    The anterior, middle, and posterior cerebral arteries are within normal limits, without evidence of significant stenosis, focal occlusion, or intracranial aneurysm formation.    Limited evaluation of the dural venous sinuses without evidence of thrombosis.    Additional findings: Centrilobular emphysema with biapical bullous disease.  1.7 cm pleural based irregular opacity in the right upper lobe (axial series 5, image 103), possible subsegmental atelectasis or fibrotic change.  Thyroid, parotid, and submandibular glands unremarkable.  Several scattered subcentimeter cervical and imaged upper mediastinal lymph nodes.  Impression: 1. No acute intracranial findings.  2. CTA head and neck without large vessel occlusion or high-grade stenosis.  3. Centrilobular emphysema with biapical bullous disease.  Pleural based irregular opacity in the right upper lobe, possible subsegmental atelectasis or fibrotic change.  Recommend non-emergent dedicated noncontrast CT chest.  4. Additional findings as above.    Electronically signed by resident: Shaun Carlos  Date:    09/24/2022  Time:    17:40    Electronically signed by: Austyn Aguirre  MD  Date:    09/24/2022  Time:    18:20      Labs, Imaging, EKG and Diagnostic results from 9/25/2022 were reviewed.    Medications:  Medication list was reviewed and changes noted under Assessment/Plan.  No current facility-administered medications on file prior to encounter.     Current Outpatient Medications on File Prior to Encounter   Medication Sig Dispense Refill    albuterol sulfate (VENTOLIN HFA INHL) Inhale into the lungs.      benzonatate (TESSALON) 200 MG capsule Take 1 capsule (200 mg total) by mouth 3 (three) times daily as needed for Cough. 20 capsule 0    budesonide-formoterol 160-4.5 mcg (SYMBICORT) 160-4.5 mcg/actuation HFAA Inhale 2 puffs into the lungs every 12 (twelve) hours. Controller      doxazosin (CARDURA) 2 MG tablet Take 2 mg by mouth every evening.      esomeprazole (NEXIUM) 20 MG capsule Take 20 mg by mouth before breakfast.      methylPREDNISolone (MEDROL DOSEPACK) 4 mg tablet As directed. 1 Package 0    naproxen sodium (ANAPROX) 220 MG tablet Take 220 mg by mouth every 12 (twelve) hours.      pantoprazole (PROTONIX) 40 MG tablet Take 40 mg by mouth once daily.      ranitidine (ZANTAC) 150 MG tablet Take 150 mg by mouth 2 (two) times daily.      tamsulosin (FLOMAX) 0.4 mg Cap Take 0.4 mg by mouth once daily.       Scheduled Medications:  cefTRIAXone (ROCEPHIN) IVPB, 2 g, Intravenous, Q24H  fluticasone furoate-vilanteroL, 1 puff, Inhalation, Daily  pantoprazole, 40 mg, Oral, Daily  tamsulosin, 0.4 mg, Oral, Daily    PRN: acetaminophen, albuterol-ipratropium, aluminum-magnesium hydroxide-simethicone, dextrose 10%, dextrose 10%, glucagon (human recombinant), glucose, glucose, influenza, melatonin, naloxone, ondansetron, sodium chloride 0.9%, Pharmacy to dose Vancomycin consult **AND** vancomycin - pharmacy to dose  Infusions:   Estimated Creatinine Clearance: 37.6 mL/min (A) (based on SCr of 1.7 mg/dL (H)).    Assessment/Plan:      * Sepsis  - SIRS 3/4 with UTI source  - sp 30ml/kg LR  bolus in ED  - continue vanc and zosyn  - follow up Urine and blood cultures  - strict I/Os  - monitor tele  - further management pending clinical course and future study review  9/25 fever of 102.6F yesterday.  sp 30ml/kg LR bolus. continue vanc and zosyn.  follow up Urine and blood cultures. COVID and flu negative. CX ray -No acute radiographic findings in the chest.. Zosyn discontinued. started on ceftriaxone     CKD (chronic kidney disease)  ? with AMBROCIO Creatine stable for now. BMP reviewed- noted Estimated Creatinine Clearance: 37.6 mL/min (A) (based on SCr of 1.7 mg/dL (H)). according to latest data. Monitor UOP and serial BMP and adjust therapy as needed. Renally dose meds.    Recent Labs   Lab 09/20/22  0851 09/24/22  1645 09/25/22  0337   BUN 11 17 18   CREATININE 1.3 1.6* 1.7*      9/25 obtain orthostatics       Leucopenia  9/25 WBC count 3.3.  ANC 2K. monitor       COPD (chronic obstructive pulmonary disease)  - continue home LABA-ICS  - duonebs prn      BPH (benign prostatic hyperplasia)  - continue home tamsulosin       UTI (urinary tract infection)  - see sepsis above      VTE Risk Mitigation (From admission, onward)           Ordered     IP VTE LOW RISK PATIENT  Once         09/24/22 2100     Place sequential compression device  Until discontinued         09/24/22 2100                    Discharge Planning   KAPIL: 9/28/2022     Code Status: Full Code   Is the patient medically ready for discharge?: No    Reason for patient still in hospital (select all that apply): Treatment                     Luis Soto MD  Department of Hospital Medicine   [facility]

## 2022-09-25 NOTE — ASSESSMENT & PLAN NOTE
? with AMBROCIO Creatine stable for now. BMP reviewed- noted Estimated Creatinine Clearance: 37.6 mL/min (A) (based on SCr of 1.7 mg/dL (H)). according to latest data. Monitor UOP and serial BMP and adjust therapy as needed. Renally dose meds.    Recent Labs   Lab 09/20/22  0851 09/24/22  1645 09/25/22  0337   BUN 11 17 18   CREATININE 1.3 1.6* 1.7*      9/25 obtain orthostatics

## 2022-09-25 NOTE — PROGRESS NOTES
Pharmacokinetic Initial Assessment: IV Vancomycin    Assessment/Plan:    Initiate intravenous vancomycin with loading dose of 1000 mg once, done in ED.  Since SCr increased from baseline, check random vancomycin level 12 hours after initial dose to determine if scheduling subsequent doses appropriate or if intermittent dosing indicated.  Desired empiric serum trough concentration is 15 to 20 mcg/mL.  Draw vancomycin random level on 09/25/2022 at 0800.  Pharmacy will continue to follow and monitor vancomycin.      Please contact pharmacy at extension [phone] with any questions regarding this assessment.     Thank you for the consult,   Jacob Quintana       Patient brief summary:  Darshan Hoskins is a 61 y.o. male initiated on antimicrobial therapy with IV Vancomycin for treatment of suspected bacteremia.    Drug Allergies:   Review of patient's allergies indicates:  No Known Allergies    Actual Body Weight:   62.6 kg    Renal Function:   Estimated Creatinine Clearance: 40.4 mL/min (A) (based on SCr of 1.6 mg/dL (H)).    CBC (last 72 hours):  Recent Labs   Lab Result Units 09/24/22  1645   WBC K/uL 3.31*   Hemoglobin g/dL 14.7   Hematocrit % 44.2   Platelets K/uL 154   Gran % % 66.2   Lymph % % 27.5   Mono % % 6.0   Eosinophil % % 0.0   Basophil % % 0.3   Differential Method  Automated       Metabolic Panel (last 72 hours):  Recent Labs   Lab Result Units 09/24/22  1645 09/24/22  1940   Sodium mmol/L 135*  --    Potassium mmol/L 4.2  --    Chloride mmol/L 96  --    CO2 mmol/L 26  --    Glucose mg/dL 96  --    Glucose, UA   --  Negative   BUN mg/dL 17  --    Creatinine mg/dL 1.6*  --    Albumin g/dL 3.8  --    Total Bilirubin mg/dL 0.4  --    Alkaline Phosphatase U/L 91  --    AST U/L 45*  --    ALT U/L 34  --        Drug levels (last 3 results):  No results for input(s): VANCOMYCINRA, VANCORANDOM, VANCOMYCINPE, VANCOPEAK, VANCOMYCINTR, VANCOTROUGH in the last 72 hours.    Microbiologic Results:  Microbiology Results  (last 7 days)       Procedure Component Value Units Date/Time    Urine culture [[id number]] Collected: 09/24/22 1940    Order Status: No result Specimen: Urine Updated: 09/24/22 1959    Influenza A & B by Molecular [[id number]] Collected: 09/24/22 1657    Order Status: Completed Specimen: Nasopharyngeal Swab Updated: 09/24/22 1804     Influenza A, Molecular Negative     Influenza B, Molecular Negative     Flu A & B Source Nasal swab    Blood culture x two cultures. Draw prior to antibiotics. [[id number]] Collected: 09/24/22 1645    Order Status: Sent Specimen: Blood from Peripheral, Antecubital, Left Updated: 09/24/22 1706    Blood culture x two cultures. Draw prior to antibiotics. [[id number]] Collected: 09/24/22 1646    Order Status: Sent Specimen: Blood from Peripheral, Upper Arm, Left Updated: 09/24/22 1706

## 2022-09-25 NOTE — PROGRESS NOTES
Pharmacokinetic Assessment Follow Up: IV Vancomycin    Vancomycin serum concentration assessment(s):    The random level was drawn correctly and can be used to guide therapy at this time. The measurement is below the desired definitive target range of 15 to 20 mcg/mL.    Vancomycin Regimen Plan:  Vancomycin random level of 9 mcg/mL drawn approximately 14 hours post vancomycin load. Baseline Scr 1.2-1.3. Patient with AMBROCIO, will continue to pulse dose.     Will give vancomycin 750 mg pulse dose today. Obtain vancomycin random with AM labs on 9/26. Goal Random: 15-20 mcg/mL.     Drug levels (last 3 results):  Recent Labs   Lab Result Units 09/25/22  0942   Vancomycin, Random ug/mL 9.0     Pharmacy will continue to follow and monitor vancomycin.    Please contact pharmacy at extension [phone] for questions regarding this assessment.    Thank you for the consult,   Eliza Renae       Patient brief summary:  Darshan Hoskins is a 61 y.o. male initiated on antimicrobial therapy with IV Vancomycin for treatment of bacteremia    The patient's current regimen is [address]    Drug Allergies:   Review of patient's allergies indicates:  No Known Allergies    Actual Body Weight:   59.8 kg    Renal Function:   Estimated Creatinine Clearance: 37.6 mL/min (A) (based on SCr of 1.7 mg/dL (H)).,     Dialysis Method (if applicable):  N/A    CBC (last 72 hours):  Recent Labs   Lab Result Units 09/24/22  1645 09/25/22  0338   WBC K/uL 3.31* 3.06*   Hemoglobin g/dL 14.7 13.3*   Hematocrit % 44.2 40.2   Platelets K/uL 154 151   Gran % % 66.2 63.8   Lymph % % 27.5 29.7   Mono % % 6.0 5.9   Eosinophil % % 0.0 0.0   Basophil % % 0.3 0.3   Differential Method  Automated Automated       Metabolic Panel (last 72 hours):  Recent Labs   Lab Result Units 09/24/22  1645 09/24/22  1940 09/25/22  0337   Sodium mmol/L 135*  --  133*   Potassium mmol/L 4.2  --  4.0   Chloride mmol/L 96  --  100   CO2 mmol/L 26  --  25   Glucose mg/dL 96  --  99   Glucose, UA    --  Negative  --    BUN mg/dL 17  --  18   Creatinine mg/dL 1.6*  --  1.7*   Albumin g/dL 3.8  --  3.0*   Total Bilirubin mg/dL 0.4  --  0.3   Alkaline Phosphatase U/L 91  --  73   AST U/L 45*  --  37   ALT U/L 34  --  26   Magnesium mg/dL  --   --  2.0       Vancomycin Administrations:  vancomycin given in the last 96 hours                     vancomycin in dextrose 5 % 1 gram/250 mL IVPB 1,000 mg (mg) 1,000 mg New Bag 09/24/22 1946                    Microbiologic Results:  Microbiology Results (last 7 days)       Procedure Component Value Units Date/Time    Blood culture x two cultures. Draw prior to antibiotics. [[id number]] Collected: 09/24/22 1646    Order Status: Completed Specimen: Blood from Peripheral, Upper Arm, Left Updated: 09/25/22 0115     Blood Culture, Routine No Growth to date    Narrative:      Aerobic and anaerobic    Blood culture x two cultures. Draw prior to antibiotics. [[id number]] Collected: 09/24/22 1645    Order Status: Completed Specimen: Blood from Peripheral, Antecubital, Left Updated: 09/25/22 0115     Blood Culture, Routine No Growth to date    Narrative:      Aerobic and anaerobic    Urine culture [[id number]] Collected: 09/24/22 1940    Order Status: No result Specimen: Urine Updated: 09/24/22 1959    Influenza A & B by Molecular [[id number]] Collected: 09/24/22 1657    Order Status: Completed Specimen: Nasopharyngeal Swab Updated: 09/24/22 1804     Influenza A, Molecular Negative     Influenza B, Molecular Negative     Flu A & B Source Nasal swab

## 2022-09-25 NOTE — HOSPITAL COURSE
9/25 fever of 102.6F yesterday.  sp 30ml/kg LR bolus. continue vanc and zosyn.  follow up Urine and blood cultures. COVID and flu negative. CX ray -No acute radiographic findings in the chest.. Zosyn discontinued. statted on ceftriaxone . orthostatics negative  9/26 AMBROCIO resolved. fever of 102F overnight. UC - PRESUMPTIVE E COLI 10,000 - 49,999 cfu/ml Identification and susceptibility pending . vancomycin discontinued. H/O BPH with incomplete emptying on tamsulosin. r. BCX2 9/24 NGTD . renal sonogram - No acute sonographic abnormality. Prostatomegaly.  9/27 UC with pansensitive E coli. Fever of 103F yesterday. BC x2 repeated.  started on oral cipro 500mg q 12h.   9/28 afebrile yesterday. likely discharge if BC x 2 remain negative  9/29 discharged home with 10 day course of oral  ciprofloxacin

## 2022-09-25 NOTE — PROVIDER PROGRESS NOTES - EMERGENCY DEPT.
Encounter Date: 9/24/2022    ED Physician Progress Notes            Received sign-out from Dr. Jameson.  Plan was follow-up workup and reassess.  Patient resting comfortably bed no acute distress patient feeling much better after initial treatment.  Remains hemodynamically stable.  CTA reassuring.  Plan was to obtain lumbar puncture after CTA, however patient endorses feels much better.  We have a urinary source for infection.  Patient declined lumbar puncture at this time.  Will plan admission for sepsis secondary to complicated UTI.

## 2022-09-25 NOTE — SUBJECTIVE & OBJECTIVE
"Past Medical History:   Diagnosis Date    Anemia     Anxiety     Arthritis     Back pain     Carpal tunnel syndrome     GERD (gastroesophageal reflux disease)     Stomach ulcer     "bleeding"       No past surgical history on file.    Review of patient's allergies indicates:  No Known Allergies    No current facility-administered medications on file prior to encounter.     Current Outpatient Medications on File Prior to Encounter   Medication Sig    albuterol sulfate (VENTOLIN HFA INHL) Inhale into the lungs.    benzonatate (TESSALON) 200 MG capsule Take 1 capsule (200 mg total) by mouth 3 (three) times daily as needed for Cough.    budesonide-formoterol 160-4.5 mcg (SYMBICORT) 160-4.5 mcg/actuation HFAA Inhale 2 puffs into the lungs every 12 (twelve) hours. Controller    doxazosin (CARDURA) 2 MG tablet Take 2 mg by mouth every evening.    esomeprazole (NEXIUM) 20 MG capsule Take 20 mg by mouth before breakfast.    methylPREDNISolone (MEDROL DOSEPACK) 4 mg tablet As directed.    naproxen sodium (ANAPROX) 220 MG tablet Take 220 mg by mouth every 12 (twelve) hours.    pantoprazole (PROTONIX) 40 MG tablet Take 40 mg by mouth once daily.    ranitidine (ZANTAC) 150 MG tablet Take 150 mg by mouth 2 (two) times daily.    tamsulosin (FLOMAX) 0.4 mg Cap Take 0.4 mg by mouth once daily.     Family History       Problem Relation (Age of Onset)    No Known Problems Mother, Father          Tobacco Use    Smoking status: Every Day     Packs/day: 1.00     Years: 25.00     Pack years: 25.00     Types: Cigarettes    Smokeless tobacco: Never   Substance and Sexual Activity    Alcohol use: No    Drug use: No    Sexual activity: Not Currently     Review of Systems   Constitutional:  Positive for appetite change, chills, diaphoresis and fatigue. Negative for fever.   HENT:  Negative for sore throat and trouble swallowing.    Eyes:  Negative for photophobia and visual disturbance.   Respiratory:  Negative for cough, shortness of breath " and wheezing.    Cardiovascular:  Negative for chest pain, palpitations and leg swelling.   Gastrointestinal:  Negative for abdominal distention, abdominal pain, diarrhea, nausea and vomiting.   Genitourinary:  Negative for dysuria and hematuria.   Musculoskeletal:  Negative for neck pain and neck stiffness.   Skin:  Negative for rash and wound.   Neurological:  Positive for dizziness, weakness and light-headedness. Negative for seizures, syncope, numbness and headaches.   Psychiatric/Behavioral:  Negative for confusion and decreased concentration.    Objective:     Vital Signs (Most Recent):  Temp: 97.8 °F (36.6 °C) (09/24/22 2210)  Pulse: 69 (09/24/22 2309)  Resp: 18 (09/24/22 2210)  BP: 112/69 (09/24/22 2210)  SpO2: 95 % (09/24/22 2210) Vital Signs (24h Range):  Temp:  [97.8 °F (36.6 °C)-102.6 °F (39.2 °C)] 97.8 °F (36.6 °C)  Pulse:  [] 69  Resp:  [16-20] 18  SpO2:  [94 %-98 %] 95 %  BP: (107-112)/(57-73) 112/69     Weight: 62.6 kg (138 lb)  Body mass index is 23.32 kg/m².    Physical Exam  Constitutional:       General: He is not in acute distress.     Appearance: He is diaphoretic. He is not toxic-appearing.   HENT:      Head: Normocephalic and atraumatic.      Nose: Nose normal.   Eyes:      General: No scleral icterus.     Extraocular Movements: Extraocular movements intact.      Pupils: Pupils are equal, round, and reactive to light.   Cardiovascular:      Rate and Rhythm: Regular rhythm. Tachycardia present.      Heart sounds: No murmur heard.  Pulmonary:      Effort: Pulmonary effort is normal. No respiratory distress.      Breath sounds: No wheezing or rales.   Abdominal:      General: Abdomen is flat. There is no distension.      Palpations: Abdomen is soft.      Tenderness: There is no abdominal tenderness. There is no guarding.   Musculoskeletal:         General: Normal range of motion.      Cervical back: Normal range of motion and neck supple. No rigidity.      Right lower leg: No edema.       Left lower leg: No edema.   Skin:     General: Skin is warm.      Coloration: Skin is not jaundiced or pale.   Neurological:      General: No focal deficit present.      Mental Status: He is alert and oriented to person, place, and time.   Psychiatric:         Mood and Affect: Mood normal.         Behavior: Behavior normal.         CRANIAL NERVES     CN III, IV, VI   Pupils are equal, round, and reactive to light.     Significant Labs: All pertinent labs within the past 24 hours have been reviewed.  CBC:   Recent Labs   Lab 09/24/22  1645   WBC 3.31*   HGB 14.7   HCT 44.2        CMP:   Recent Labs   Lab 09/24/22  1645   *   K 4.2   CL 96   CO2 26   GLU 96   BUN 17   CREATININE 1.6*   CALCIUM 9.3   PROT 8.0   ALBUMIN 3.8   BILITOT 0.4   ALKPHOS 91   AST 45*   ALT 34   ANIONGAP 13       Significant Imaging: I have reviewed all pertinent imaging results/findings within the past 24 hours.

## 2022-09-26 PROBLEM — N17.9 AKI (ACUTE KIDNEY INJURY): Status: RESOLVED | Noted: 2022-09-26 | Resolved: 2022-09-26

## 2022-09-26 PROBLEM — N17.9 AKI (ACUTE KIDNEY INJURY): Status: ACTIVE | Noted: 2022-09-26

## 2022-09-26 LAB
ALBUMIN SERPL BCP-MCNC: 2.9 G/DL (ref 3.5–5.2)
ALP SERPL-CCNC: 73 U/L (ref 55–135)
ALT SERPL W/O P-5'-P-CCNC: 22 U/L (ref 10–44)
ANION GAP SERPL CALC-SCNC: 9 MMOL/L (ref 8–16)
AST SERPL-CCNC: 38 U/L (ref 10–40)
BACTERIA UR CULT: ABNORMAL
BASOPHILS # BLD AUTO: 0.01 K/UL (ref 0–0.2)
BASOPHILS NFR BLD: 0.4 % (ref 0–1.9)
BILIRUB SERPL-MCNC: 0.2 MG/DL (ref 0.1–1)
BUN SERPL-MCNC: 13 MG/DL (ref 8–23)
CALCIUM SERPL-MCNC: 8.4 MG/DL (ref 8.7–10.5)
CHLORIDE SERPL-SCNC: 102 MMOL/L (ref 95–110)
CO2 SERPL-SCNC: 24 MMOL/L (ref 23–29)
CREAT SERPL-MCNC: 1.4 MG/DL (ref 0.5–1.4)
DIFFERENTIAL METHOD: ABNORMAL
EOSINOPHIL # BLD AUTO: 0 K/UL (ref 0–0.5)
EOSINOPHIL NFR BLD: 0 % (ref 0–8)
ERYTHROCYTE [DISTWIDTH] IN BLOOD BY AUTOMATED COUNT: 13.2 % (ref 11.5–14.5)
EST. GFR  (NO RACE VARIABLE): 56.5 ML/MIN/1.73 M^2
GLUCOSE SERPL-MCNC: 111 MG/DL (ref 70–110)
HCT VFR BLD AUTO: 37.7 % (ref 40–54)
HGB BLD-MCNC: 12.9 G/DL (ref 14–18)
IMM GRANULOCYTES # BLD AUTO: 0.01 K/UL (ref 0–0.04)
IMM GRANULOCYTES NFR BLD AUTO: 0.4 % (ref 0–0.5)
LACTATE SERPL-SCNC: 0.9 MMOL/L (ref 0.5–2.2)
LYMPHOCYTES # BLD AUTO: 0.8 K/UL (ref 1–4.8)
LYMPHOCYTES NFR BLD: 28.2 % (ref 18–48)
MAGNESIUM SERPL-MCNC: 2.1 MG/DL (ref 1.6–2.6)
MCH RBC QN AUTO: 31.9 PG (ref 27–31)
MCHC RBC AUTO-ENTMCNC: 34.2 G/DL (ref 32–36)
MCV RBC AUTO: 93 FL (ref 82–98)
MONOCYTES # BLD AUTO: 0.2 K/UL (ref 0.3–1)
MONOCYTES NFR BLD: 6.4 % (ref 4–15)
NEUTROPHILS # BLD AUTO: 1.8 K/UL (ref 1.8–7.7)
NEUTROPHILS NFR BLD: 64.6 % (ref 38–73)
NRBC BLD-RTO: 0 /100 WBC
PLATELET # BLD AUTO: 155 K/UL (ref 150–450)
PMV BLD AUTO: 9.9 FL (ref 9.2–12.9)
POCT GLUCOSE: 110 MG/DL (ref 70–110)
POTASSIUM SERPL-SCNC: 4 MMOL/L (ref 3.5–5.1)
PROT SERPL-MCNC: 6.4 G/DL (ref 6–8.4)
RBC # BLD AUTO: 4.04 M/UL (ref 4.6–6.2)
SODIUM SERPL-SCNC: 135 MMOL/L (ref 136–145)
VANCOMYCIN SERPL-MCNC: 7.9 UG/ML
WBC # BLD AUTO: 2.8 K/UL (ref 3.9–12.7)

## 2022-09-26 PROCEDURE — 25000003 PHARM REV CODE 250: Performed by: FAMILY MEDICINE

## 2022-09-26 PROCEDURE — 63600175 PHARM REV CODE 636 W HCPCS: Performed by: HOSPITALIST

## 2022-09-26 PROCEDURE — 83735 ASSAY OF MAGNESIUM: CPT | Performed by: FAMILY MEDICINE

## 2022-09-26 PROCEDURE — 80202 ASSAY OF VANCOMYCIN: CPT | Performed by: HOSPITALIST

## 2022-09-26 PROCEDURE — 36415 COLL VENOUS BLD VENIPUNCTURE: CPT | Performed by: PHYSICIAN ASSISTANT

## 2022-09-26 PROCEDURE — 87040 BLOOD CULTURE FOR BACTERIA: CPT | Mod: 59 | Performed by: PHYSICIAN ASSISTANT

## 2022-09-26 PROCEDURE — 99232 PR SUBSEQUENT HOSPITAL CARE,LEVL II: ICD-10-PCS | Mod: ,,, | Performed by: HOSPITALIST

## 2022-09-26 PROCEDURE — 25000242 PHARM REV CODE 250 ALT 637 W/ HCPCS: Performed by: FAMILY MEDICINE

## 2022-09-26 PROCEDURE — 99232 SBSQ HOSP IP/OBS MODERATE 35: CPT | Mod: ,,, | Performed by: HOSPITALIST

## 2022-09-26 PROCEDURE — 83605 ASSAY OF LACTIC ACID: CPT | Performed by: PHYSICIAN ASSISTANT

## 2022-09-26 PROCEDURE — 80053 COMPREHEN METABOLIC PANEL: CPT | Performed by: FAMILY MEDICINE

## 2022-09-26 PROCEDURE — 94640 AIRWAY INHALATION TREATMENT: CPT

## 2022-09-26 PROCEDURE — 11000001 HC ACUTE MED/SURG PRIVATE ROOM

## 2022-09-26 PROCEDURE — 85025 COMPLETE CBC W/AUTO DIFF WBC: CPT | Performed by: FAMILY MEDICINE

## 2022-09-26 RX ADMIN — PANTOPRAZOLE SODIUM 40 MG: 40 TABLET, DELAYED RELEASE ORAL at 09:09

## 2022-09-26 RX ADMIN — FLUTICASONE FUROATE AND VILANTEROL TRIFENATATE 1 PUFF: 100; 25 POWDER RESPIRATORY (INHALATION) at 08:09

## 2022-09-26 RX ADMIN — ACETAMINOPHEN 1000 MG: 500 TABLET ORAL at 01:09

## 2022-09-26 RX ADMIN — ACETAMINOPHEN 1000 MG: 500 TABLET ORAL at 04:09

## 2022-09-26 RX ADMIN — TAMSULOSIN HYDROCHLORIDE 0.4 MG: 0.4 CAPSULE ORAL at 09:09

## 2022-09-26 RX ADMIN — CEFTRIAXONE 2 G: 2 INJECTION, SOLUTION INTRAVENOUS at 09:09

## 2022-09-26 NOTE — PLAN OF CARE
"[facility]  Discharge Assessment    Primary Care Provider: Prashant     Discharge Assessment (most recent)       BRIEF DISCHARGE ASSESSMENT - 09/26/22 1551          Discharge Planning    Assessment Type Discharge Planning Brief Assessment     Resource/Environmental Concerns none     Support Systems Family members     Equipment Currently Used at Home none     Current Living Arrangements home/apartment/condo     Patient/Family Anticipates Transition to home     Patient/Family Anticipated Services at Transition none     DME Needed Upon Discharge  none     Discharge Plan A Home     Discharge Plan B Home Health                   "This is a 62yo male with a past medical history of BPH, COPD, and GERD who presents to the ED with chief complaint of decreased appetites, fatigue and pre-syncopal symptoms"    CM to continue to follow for any dc needs.      Ashlyn Meza RN, CM  Case Management Dept  Phone: [phone]         "

## 2022-09-26 NOTE — PROGRESS NOTES
Therapy with Vancomycin complete and/or consult discontinued by provider.  Pharmacy will sign off, please re-consult as needed.    Kalyn De Santiago, CarlosD

## 2022-09-26 NOTE — PLAN OF CARE
Problem: Adult Inpatient Plan of Care  Goal: Plan of Care Review  Outcome: Ongoing, Progressing  Goal: Patient-Specific Goal (Individualized)  Outcome: Ongoing, Progressing  Goal: Absence of Hospital-Acquired Illness or Injury  Outcome: Ongoing, Progressing  Goal: Optimal Comfort and Wellbeing  Outcome: Ongoing, Progressing     Problem: Bleeding (Sepsis/Septic Shock)  Goal: Absence of Bleeding  Outcome: Ongoing, Progressing     Problem: Infection Progression (Sepsis/Septic Shock)  Goal: Absence of Infection Signs and Symptoms  Outcome: Ongoing, Progressing     POC reviewed with patient. All questions and concerns addressed. Fall/safety precautions implemented and maintained. Patient febrile 102.9. PRN tylenol administered. Temp 99.3 rechecked. Please see flowsheet for full assessment and vitals. Bed locked in lowest position. Call bell within reach. Will continue to monitor.

## 2022-09-26 NOTE — ASSESSMENT & PLAN NOTE
- SIRS 3/4 with UTI source  - sp 30ml/kg LR bolus in ED  - continue vanc and zosyn  - follow up Urine and blood cultures  - strict I/Os  - monitor tele  - further management pending clinical course and future study review  9/25 fever of 102.6F yesterday.  sp 30ml/kg LR bolus. continue vanc and zosyn.  follow up Urine and blood cultures. COVID and flu negative. CX ray -No acute radiographic findings in the chest.. Zosyn discontinued. started on ceftriaxone   9/26 AMBROCIO resolved. fever of 102F overnight. UC - PRESUMPTIVE E COLI 10,000 - 49,999 cfu/ml Identification and susceptibility pending . vancomycin discontinued. H/O BPH with incomplete emptying on tamsulosin. renal sonogram orderd . BCX2 9/24 NGTD  9/27 UC with pansensitive E coli. Fever of 103F yesterday. BC x2 repeated.  started on oral cipro 500mg q 12h.   9/28 afebrile yesterday. likely discharge if BC x 2 remain negative  9/29 discharged home with 10 day course of oral  ciprofloxacin

## 2022-09-26 NOTE — PROGRESS NOTES
[facility] Medicine  Progress Note    Patient Name: Darshan Hoskins  MRN: [id number]  Patient Class: IP- Inpatient   Admission Date: 9/24/2022  Length of Stay: 2 days  Attending Physician: Luis Soto MD  Primary Care Provider: Suzette Of [place]        Subjective:     Principal Problem:Sepsis        HPI:  This is a 60yo male with a past medical history of BPH, COPD, and GERD who presents to the ED with chief complaint of decreased appetites, fatigue and pre-syncopal symptoms. Patient states that he was feeling well and in his regular state of health until 4-5 days ago when he started developing new symptoms of fatigue and decreased appetite. Denies nausea or vomiting. States that yesterday and today he started having dizziness with standing up and feeling weak with minimal ambulation. In the ED patient Temp 102.6, tachycardic, hemodynamically stable saturating well on room air. WBC 3.31, LA 1.0. UA consistent with UTI. Patient started on 30ml/kg IVF bolus and vanc and zosyn for urinary sepsis and admitted to the [facility] medicine for further evaluation and management.      Overview/Hospital Course:  9/25 fever of 102.6F yesterday.  sp 30ml/kg LR bolus. continue vanc and zosyn.  follow up Urine and blood cultures. COVID and flu negative. CX ray -No acute radiographic findings in the chest.. Zosyn discontinued. statted on ceftriaxone . orthostatics negative  9/26 AMBROCIO resolved. fever of 102F overnight. UC - PRESUMPTIVE E COLI 10,000 - 49,999 cfu/ml Identification and susceptibility pending . vancomycin discontinued. H/O BPH with incomplete emptying on tamsulosin. renal sonogram orderd . BCX2 9/24 NGTD . renal sonogram - No acute sonographic abnormality. Prostatomegaly.                   Review of Systems:   Pain scale:    Constitutional:  fever,  chills, headache, vision loss, hearing loss, weight loss, Generalized weakness, falls, loss of smell, loss of taste, poor appetite,  sore  throat  Respiratory: cough, shortness of breath.   Cardiovascular: chest pain, dizziness -resolved , palpitations, orthopnea, swelling of feet, syncope  Gastrointestinal: nausea, vomiting, abdominal pain, diarrhea, black stool,  blood in stool, change in bowel habits  Genitourinary: hematuria, dysuria, urgency, frequency  Integument/Breast: rash,  pruritis  Hematologic/Lymphatic: easy bruising, lymphadenopathy  Musculoskeletal: arthralgias , myalgias, back pain, neck pain, knee pain  Neurological: confusion, seizures, tremors, slurred speech  Behavioral/Psych:  depression, anxiety, auditory or visual hallucinations     OBJECTIVE:     Physical Exam:  Body mass index is 22.28 kg/m².    Constitutional: Appears well-developed and well-nourished.   Head: Normocephalic and atraumatic.   Neck: Normal range of motion. Neck supple.   Cardiovascular: Normal heart rate.  Regular heart rhythm.  Pulmonary/Chest: Effort normal.   Abdominal: No distension.  No tenderness  Musculoskeletal: Normal range of motion. No edema.   Neurological: Alert and oriented to person, place, and time.   Skin: Skin is warm and dry.   Psychiatric: Normal mood and affect. Behavior is normal.                  Vital Signs  Temp: (Abnormal) 100.4 °F (38 °C) (09/26/22 1044)  Pulse: 90 (09/26/22 1045)  Resp: 18 (09/26/22 0841)  BP: 109/69 (09/26/22 1044)  SpO2: 95 % (09/26/22 1045)     24 Hour VS Range    Temp:  [99.3 °F (37.4 °C)-102.9 °F (39.4 °C)]   Pulse:  []   Resp:  [15-18]   BP: (103-134)/(62-74)   SpO2:  [87 %-98 %]     Intake/Output Summary (Last 24 hours) at 9/26/2022 1542  Last data filed at 9/26/2022 0600  Gross per 24 hour   Intake 120 ml   Output 1125 ml   Net -1005 ml         I/O This Shift:  No intake/output data recorded.    Wt Readings from Last 3 Encounters:   09/25/22 59.8 kg (131 lb 13.4 oz)   09/20/22 60.8 kg (134 lb 0.6 oz)   02/05/21 60.8 kg (134 lb)       I have personally reviewed the vitals and recorded Intake/Output      Laboratory/Diagnostic Data:    CBC/Anemia Labs: Coags:    Recent Labs   Lab 09/24/22  1645 09/25/22  0338 09/26/22  0439   WBC 3.31* 3.06* 2.80*   HGB 14.7 13.3* 12.9*   HCT 44.2 40.2 37.7*    151 155   MCV 94 96 93   RDW 13.2 13.2 13.2    No results for input(s): PT, INR, APTT in the last 168 hours.     Chemistries: ABG:   Recent Labs   Lab 09/24/22  1645 09/25/22  0337 09/26/22  0439   * 133* 135*   K 4.2 4.0 4.0   CL 96 100 102   CO2 26 25 24   BUN 17 18 13   CREATININE 1.6* 1.7* 1.4   CALCIUM 9.3 8.6* 8.4*   PROT 8.0 6.2 6.4   BILITOT 0.4 0.3 0.2   ALKPHOS 91 73 73   ALT 34 26 22   AST 45* 37 38   MG  --  2.0 2.1    No results for input(s): PH, PCO2, PO2, HCO3, POCSATURATED, BE in the last 168 hours.     POCT Glucose: HbA1c:    Recent Labs   Lab 09/24/22  1622   POCTGLUCOSE 103    No results found for: HGBA1C     Cardiac Enzymes: Ejection Fractions:    Recent Labs     09/24/22  1645   TROPONINI <0.006    No results found for: EF       Recent Labs   Lab 09/24/22  1940   COLORU Yellow   APPEARANCEUA Clear   PHUR 6.0   SPECGRAV >1.030*   PROTEINUA 1+*   GLUCUA Negative   KETONESU Negative   BILIRUBINUA Negative   OCCULTUA 1+*   NITRITE Negative   LEUKOCYTESUR 3+*   RBCUA 3   WBCUA 82*   BACTERIA None   SQUAMEPITHEL 1   HYALINECASTS 0       Lactate (Lactic Acid) (mmol/L)   Date Value   09/24/2022 1.0     BNP (pg/mL)   Date Value   09/24/2022 <10     No results found for: CRP, SEDRATE  No results found for: DDIMER  No results found for: FERRITIN  No results found for: LDH  Troponin I (ng/mL)   Date Value   09/24/2022 <0.006   09/20/2022 <0.006     CPK (U/L)   Date Value   06/20/2006 106     No results found for this or any previous visit.  SARS-CoV-2 RNA, Amplification, Qual (no units)   Date Value   09/24/2022 Negative   09/20/2022 Negative     POC Rapid COVID (no units)   Date Value   02/05/2021 Negative       Microbiology labs for the last week  Microbiology Results (last 7 days)       Procedure  Component Value Units Date/Time    Urine culture [[id number]]  (Abnormal) Collected: 09/24/22 1940    Order Status: Completed Specimen: Urine Updated: 09/25/22 2027     Urine Culture, Routine PRESUMPTIVE E COLI  10,000 - 49,999 cfu/ml  Identification and susceptibility pending      Narrative:      Specimen Source->Urine    Blood culture x two cultures. Draw prior to antibiotics. [[id number]] Collected: 09/24/22 1646    Order Status: Completed Specimen: Blood from Peripheral, Upper Arm, Left Updated: 09/25/22 1812     Blood Culture, Routine No Growth to date      No Growth to date    Narrative:      Aerobic and anaerobic    Blood culture x two cultures. Draw prior to antibiotics. [[id number]] Collected: 09/24/22 1645    Order Status: Completed Specimen: Blood from Peripheral, Antecubital, Left Updated: 09/25/22 1812     Blood Culture, Routine No Growth to date      No Growth to date    Narrative:      Aerobic and anaerobic    Influenza A & B by Molecular [[id number]] Collected: 09/24/22 1657    Order Status: Completed Specimen: Nasopharyngeal Swab Updated: 09/24/22 1804     Influenza A, Molecular Negative     Influenza B, Molecular Negative     Flu A & B Source Nasal swab            Reviewed and noted in plan where applicable- Please see chart for full lab data.    Lines/Drains:       Peripheral IV - Single Lumen 09/24/22 1639 20 G Left Antecubital (Active)   Site Assessment Clean;Dry;Intact 09/25/22 0400   Line Status Flushed;Saline locked 09/25/22 0400   Dressing Status Intact;Dry;Clean 09/25/22 0400   Dressing Intervention Integrity maintained 09/25/22 0400   Number of days: 0       Imaging  ECG Results              EKG 12-lead (Final result)  Result time 09/25/22 16:25:01      Final result by Interface, Lab In [facility] (09/25/22 16:25:01)               Narrative:    Test Reason : AMS    Vent. Rate : 099 BPM     Atrial Rate : 099 BPM     P-R Int : 142 ms          QRS Dur : 068 ms      QT Int : 318 ms       P-R-T Axes :  063 000 061 degrees     QTc Int : 408 ms    Normal sinus rhythm  Normal ECG  When compared with ECG of 20-SEP-2022 08:56,  Questionable change in The axis  Confirmed by Salazar MARHSALL MD ([id number]) on 9/25/2022 4:24:54 PM    Referred By: DARION   SELF           Confirmed By:Salazar MARSHALL MD                                  No results found for this or any previous visit.      US Retroperitoneal Complete  Narrative: EXAMINATION:  US RETROPERITONEAL COMPLETE    CLINICAL HISTORY:  UTI;    TECHNIQUE:  Ultrasound of the kidneys and urinary bladder was performed including color flow and Doppler evaluation of the kidneys.    COMPARISON:  None.    FINDINGS:  Right kidney: The right kidney measures 9.6 cm. No cortical thinning. No loss of corticomedullary distinction. Resistive index measures 0.76.  No mass. No renal stone. No hydronephrosis.    Left kidney: The left kidney measures 9.0 cm. No cortical thinning. No loss of corticomedullary distinction. Resistive index measures 0.70.  Simple cyst in the superior pole measuring 1.6 x 0.8 x 1.3 cm.  No mass. No renal stone. No hydronephrosis.    The bladder is partially distended at the time of scanning and has an unremarkable appearance.  Prostate measures 4.5 x 4.0 x 3.8 cm.  Impression: No acute sonographic abnormality.    Prostatomegaly.    Electronically signed by: Danis Topete MD  Date:    09/26/2022  Time:    12:05      Labs, Imaging, EKG and Diagnostic results from 9/26/2022 were reviewed.    Medications:  Medication list was reviewed and changes noted under Assessment/Plan.  No current facility-administered medications on file prior to encounter.     Current Outpatient Medications on File Prior to Encounter   Medication Sig Dispense Refill    albuterol sulfate (VENTOLIN HFA INHL) Inhale into the lungs.      benzonatate (TESSALON) 200 MG capsule Take 1 capsule (200 mg total) by mouth 3 (three) times daily as needed for Cough. 20 capsule 0    budesonide-formoterol 160-4.5  mcg (SYMBICORT) 160-4.5 mcg/actuation HFAA Inhale 2 puffs into the lungs every 12 (twelve) hours. Controller      doxazosin (CARDURA) 2 MG tablet Take 2 mg by mouth every evening.      esomeprazole (NEXIUM) 20 MG capsule Take 20 mg by mouth before breakfast.      methylPREDNISolone (MEDROL DOSEPACK) 4 mg tablet As directed. 1 Package 0    naproxen sodium (ANAPROX) 220 MG tablet Take 220 mg by mouth every 12 (twelve) hours.      pantoprazole (PROTONIX) 40 MG tablet Take 40 mg by mouth once daily.      ranitidine (ZANTAC) 150 MG tablet Take 150 mg by mouth 2 (two) times daily.      tamsulosin (FLOMAX) 0.4 mg Cap Take 0.4 mg by mouth once daily.       Scheduled Medications:  cefTRIAXone (ROCEPHIN) IVPB, 2 g, Intravenous, Q24H  fluticasone furoate-vilanteroL, 1 puff, Inhalation, Daily  pantoprazole, 40 mg, Oral, Daily  tamsulosin, 0.4 mg, Oral, Daily    PRN: acetaminophen, albuterol-ipratropium, aluminum-magnesium hydroxide-simethicone, dextrose 10%, dextrose 10%, glucagon (human recombinant), glucose, glucose, influenza, melatonin, naloxone, ondansetron, sodium chloride 0.9%  Infusions:   Estimated Creatinine Clearance: 45.7 mL/min (based on SCr of 1.4 mg/dL).    Assessment/Plan:      * Sepsis  - SIRS 3/4 with UTI source  - sp 30ml/kg LR bolus in ED  - continue vanc and zosyn  - follow up Urine and blood cultures  - strict I/Os  - monitor tele  - further management pending clinical course and future study review  9/25 fever of 102.6F yesterday.  sp 30ml/kg LR bolus. continue vanc and zosyn.  follow up Urine and blood cultures. COVID and flu negative. CX ray -No acute radiographic findings in the chest.. Zosyn discontinued. started on ceftriaxone   9/26 AMBROCIO resolved. fever of 102F overnight. UC - PRESUMPTIVE E COLI 10,000 - 49,999 cfu/ml Identification and susceptibility pending . vancomycin discontinued. H/O BPH with incomplete emptying on tamsulosin. renal sonogram orderd . BCX2 9/24 NGTD       CKD (chronic kidney  disease)  ? with AMBROCIO Creatine stable for now. BMP reviewed- noted Estimated Creatinine Clearance: 45.7 mL/min (based on SCr of 1.4 mg/dL). according to latest data. Monitor UOP and serial BMP and adjust therapy as needed. Renally dose meds.    Recent Labs   Lab 09/24/22  1645 09/25/22  0337 09/26/22  0439   BUN 17 18 13   CREATININE 1.6* 1.7* 1.4      9/25  orthostatics negative  9/26 AMBROCIO resolved.       Leucopenia  9/25 WBC count 3.3.  ANC 2K. monitor       COPD (chronic obstructive pulmonary disease)  - continue home LABA-ICS  - duonebs prn      BPH (benign prostatic hyperplasia)  - continue home tamsulosin       UTI (urinary tract infection)  - see sepsis above      VTE Risk Mitigation (From admission, onward)           Ordered     IP VTE LOW RISK PATIENT  Once         09/24/22 2100     Place sequential compression device  Until discontinued         09/24/22 2100                    Discharge Planning   KAPIL: 9/28/2022     Code Status: Full Code   Is the patient medically ready for discharge?: No    Reason for patient still in hospital (select all that apply): Treatment                     Luis Soto MD  Department of Hospital Medicine   [facility]

## 2022-09-26 NOTE — ASSESSMENT & PLAN NOTE
? with AMBROCIO Creatine stable for now. BMP reviewed- noted Estimated Creatinine Clearance: 45.7 mL/min (based on SCr of 1.4 mg/dL). according to latest data. Monitor UOP and serial BMP and adjust therapy as needed. Renally dose meds.    Recent Labs   Lab 09/24/22  1645 09/25/22  0337 09/26/22  0439   BUN 17 18 13   CREATININE 1.6* 1.7* 1.4      9/25  orthostatics negative  9/26 AMBROCIO resolved.

## 2022-09-27 LAB
ALBUMIN SERPL BCP-MCNC: 3 G/DL (ref 3.5–5.2)
ALP SERPL-CCNC: 74 U/L (ref 55–135)
ALT SERPL W/O P-5'-P-CCNC: 23 U/L (ref 10–44)
ANION GAP SERPL CALC-SCNC: 11 MMOL/L (ref 8–16)
AST SERPL-CCNC: 43 U/L (ref 10–40)
BASOPHILS # BLD AUTO: 0.01 K/UL (ref 0–0.2)
BASOPHILS NFR BLD: 0.3 % (ref 0–1.9)
BILIRUB SERPL-MCNC: 0.2 MG/DL (ref 0.1–1)
BUN SERPL-MCNC: 10 MG/DL (ref 8–23)
CALCIUM SERPL-MCNC: 8.5 MG/DL (ref 8.7–10.5)
CHLORIDE SERPL-SCNC: 101 MMOL/L (ref 95–110)
CO2 SERPL-SCNC: 22 MMOL/L (ref 23–29)
CREAT SERPL-MCNC: 1.3 MG/DL (ref 0.5–1.4)
DIFFERENTIAL METHOD: ABNORMAL
EOSINOPHIL # BLD AUTO: 0 K/UL (ref 0–0.5)
EOSINOPHIL NFR BLD: 0 % (ref 0–8)
ERYTHROCYTE [DISTWIDTH] IN BLOOD BY AUTOMATED COUNT: 13.3 % (ref 11.5–14.5)
EST. GFR  (NO RACE VARIABLE): >60 ML/MIN/1.73 M^2
GLUCOSE SERPL-MCNC: 90 MG/DL (ref 70–110)
HCT VFR BLD AUTO: 38.9 % (ref 40–54)
HGB BLD-MCNC: 13.2 G/DL (ref 14–18)
IMM GRANULOCYTES # BLD AUTO: 0.01 K/UL (ref 0–0.04)
IMM GRANULOCYTES NFR BLD AUTO: 0.3 % (ref 0–0.5)
LYMPHOCYTES # BLD AUTO: 0.9 K/UL (ref 1–4.8)
LYMPHOCYTES NFR BLD: 27.5 % (ref 18–48)
MAGNESIUM SERPL-MCNC: 1.9 MG/DL (ref 1.6–2.6)
MCH RBC QN AUTO: 31.1 PG (ref 27–31)
MCHC RBC AUTO-ENTMCNC: 33.9 G/DL (ref 32–36)
MCV RBC AUTO: 92 FL (ref 82–98)
MONOCYTES # BLD AUTO: 0.2 K/UL (ref 0.3–1)
MONOCYTES NFR BLD: 5.3 % (ref 4–15)
NEUTROPHILS # BLD AUTO: 2.1 K/UL (ref 1.8–7.7)
NEUTROPHILS NFR BLD: 66.6 % (ref 38–73)
NRBC BLD-RTO: 0 /100 WBC
PLATELET # BLD AUTO: 173 K/UL (ref 150–450)
PMV BLD AUTO: 10 FL (ref 9.2–12.9)
POTASSIUM SERPL-SCNC: 4.3 MMOL/L (ref 3.5–5.1)
PROT SERPL-MCNC: 6.6 G/DL (ref 6–8.4)
RBC # BLD AUTO: 4.25 M/UL (ref 4.6–6.2)
SODIUM SERPL-SCNC: 134 MMOL/L (ref 136–145)
WBC # BLD AUTO: 3.2 K/UL (ref 3.9–12.7)

## 2022-09-27 PROCEDURE — 85025 COMPLETE CBC W/AUTO DIFF WBC: CPT | Performed by: FAMILY MEDICINE

## 2022-09-27 PROCEDURE — 36415 COLL VENOUS BLD VENIPUNCTURE: CPT | Performed by: FAMILY MEDICINE

## 2022-09-27 PROCEDURE — 11000001 HC ACUTE MED/SURG PRIVATE ROOM

## 2022-09-27 PROCEDURE — 94761 N-INVAS EAR/PLS OXIMETRY MLT: CPT

## 2022-09-27 PROCEDURE — 94640 AIRWAY INHALATION TREATMENT: CPT

## 2022-09-27 PROCEDURE — 99900031 HC PATIENT EDUCATION (STAT)

## 2022-09-27 PROCEDURE — 99233 PR SUBSEQUENT HOSPITAL CARE,LEVL III: ICD-10-PCS | Mod: ,,, | Performed by: HOSPITALIST

## 2022-09-27 PROCEDURE — 80053 COMPREHEN METABOLIC PANEL: CPT | Performed by: FAMILY MEDICINE

## 2022-09-27 PROCEDURE — 25000003 PHARM REV CODE 250: Performed by: FAMILY MEDICINE

## 2022-09-27 PROCEDURE — 83735 ASSAY OF MAGNESIUM: CPT | Performed by: FAMILY MEDICINE

## 2022-09-27 PROCEDURE — 99233 SBSQ HOSP IP/OBS HIGH 50: CPT | Mod: ,,, | Performed by: HOSPITALIST

## 2022-09-27 PROCEDURE — 25000003 PHARM REV CODE 250: Performed by: HOSPITALIST

## 2022-09-27 RX ORDER — CIPROFLOXACIN 500 MG/1
500 TABLET ORAL EVERY 12 HOURS
Status: DISCONTINUED | OUTPATIENT
Start: 2022-09-27 | End: 2022-09-29 | Stop reason: HOSPADM

## 2022-09-27 RX ADMIN — PANTOPRAZOLE SODIUM 40 MG: 40 TABLET, DELAYED RELEASE ORAL at 09:09

## 2022-09-27 RX ADMIN — ACETAMINOPHEN 1000 MG: 500 TABLET ORAL at 04:09

## 2022-09-27 RX ADMIN — FLUTICASONE FUROATE AND VILANTEROL TRIFENATATE 1 PUFF: 100; 25 POWDER RESPIRATORY (INHALATION) at 01:09

## 2022-09-27 RX ADMIN — TAMSULOSIN HYDROCHLORIDE 0.4 MG: 0.4 CAPSULE ORAL at 09:09

## 2022-09-27 RX ADMIN — CIPROFLOXACIN 500 MG: 500 TABLET, FILM COATED ORAL at 08:09

## 2022-09-27 RX ADMIN — CIPROFLOXACIN 500 MG: 500 TABLET, FILM COATED ORAL at 09:09

## 2022-09-27 NOTE — PROGRESS NOTES
[facility] Medicine  Progress Note    Patient Name: Darshan Hoskins  MRN: [id number]  Patient Class: IP- Inpatient   Admission Date: 9/24/2022  Length of Stay: 3 days  Attending Physician: Luis Soto MD  Primary Care Provider: Prashant        Subjective:     Principal Problem:Sepsis        HPI:  This is a 60yo male with a past medical history of BPH, COPD, and GERD who presents to the ED with chief complaint of decreased appetites, fatigue and pre-syncopal symptoms. Patient states that he was feeling well and in his regular state of health until 4-5 days ago when he started developing new symptoms of fatigue and decreased appetite. Denies nausea or vomiting. States that yesterday and today he started having dizziness with standing up and feeling weak with minimal ambulation. In the ED patient Temp 102.6, tachycardic, hemodynamically stable saturating well on room air. WBC 3.31, LA 1.0. UA consistent with UTI. Patient started on 30ml/kg IVF bolus and vanc and zosyn for urinary sepsis and admitted to the [facility] medicine for further evaluation and management.      Overview/Hospital Course:  9/25 fever of 102.6F yesterday.  sp 30ml/kg LR bolus. continue vanc and zosyn.  follow up Urine and blood cultures. COVID and flu negative. CX ray -No acute radiographic findings in the chest.. Zosyn discontinued. statted on ceftriaxone . orthostatics negative  9/26 AMBROCIO resolved. fever of 102F overnight. UC - PRESUMPTIVE E COLI 10,000 - 49,999 cfu/ml Identification and susceptibility pending . vancomycin discontinued. H/O BPH with incomplete emptying on tamsulosin. renal sonogram orderd . BCX2 9/24 NGTD . renal sonogram - No acute sonographic abnormality. Prostatomegaly.  9/27 UC with pansensitive E coli. Fever of 103F yesterday. BC x2 repeated.  started on oral cipro 500mg q 12h.                    Review of Systems:   Pain scale:    Constitutional:  fever,  chills, headache, vision  loss, hearing loss, weight loss, Generalized weakness, falls, loss of smell, loss of taste, poor appetite,  sore throat  Respiratory: cough, shortness of breath.   Cardiovascular: chest pain, dizziness -resolved , palpitations, orthopnea, swelling of feet, syncope  Gastrointestinal: nausea, vomiting, abdominal pain, diarrhea, black stool,  blood in stool, change in bowel habits  Genitourinary: hematuria, dysuria, urgency, frequency  Integument/Breast: rash,  pruritis  Hematologic/Lymphatic: easy bruising, lymphadenopathy  Musculoskeletal: arthralgias , myalgias, back pain, neck pain, knee pain  Neurological: confusion, seizures, tremors, slurred speech  Behavioral/Psych:  depression, anxiety, auditory or visual hallucinations     OBJECTIVE:     Physical Exam:  Body mass index is 22.28 kg/m².    Constitutional: Appears well-developed and well-nourished.   Head: Normocephalic and atraumatic.   Neck: Normal range of motion. Neck supple.   Cardiovascular: Normal heart rate.  Regular heart rhythm.  Pulmonary/Chest: Effort normal.   Abdominal: No distension.  No tenderness  Musculoskeletal: Normal range of motion. No edema.   Neurological: Alert and oriented to person, place, and time.   Skin: Skin is warm and dry.   Psychiatric: Normal mood and affect. Behavior is normal.                  Vital Signs  Temp: 99.9 °F (37.7 °C) (09/27/22 1626)  Pulse: 92 (09/27/22 1626)  Resp: 16 (09/27/22 1626)  BP: 113/66 (09/27/22 1626)  SpO2: (Abnormal) 93 % (09/27/22 1626)     24 Hour VS Range    Temp:  [96.8 °F (36 °C)-99.9 °F (37.7 °C)]   Pulse:  []   Resp:  [16-18]   BP: (103-126)/(63-72)   SpO2:  [92 %-97 %]     Intake/Output Summary (Last 24 hours) at 9/27/2022 1925  Last data filed at 9/27/2022 1824  Gross per 24 hour   Intake 220 ml   Output no documentation   Net 220 ml           I/O This Shift:  No intake/output data recorded.    Wt Readings from Last 3 Encounters:   09/25/22 59.8 kg (131 lb 13.4 oz)   09/20/22 60.8 kg  (134 lb 0.6 oz)   02/05/21 60.8 kg (134 lb)       I have personally reviewed the vitals and recorded Intake/Output     Laboratory/Diagnostic Data:    CBC/Anemia Labs: Coags:    Recent Labs   Lab 09/25/22 0338 09/26/22  0439 09/27/22  0453   WBC 3.06* 2.80* 3.20*   HGB 13.3* 12.9* 13.2*   HCT 40.2 37.7* 38.9*    155 173   MCV 96 93 92   RDW 13.2 13.2 13.3    No results for input(s): PT, INR, APTT in the last 168 hours.     Chemistries: ABG:   Recent Labs   Lab 09/25/22 0337 09/26/22 0439 09/27/22  0453   * 135* 134*   K 4.0 4.0 4.3    102 101   CO2 25 24 22*   BUN 18 13 10   CREATININE 1.7* 1.4 1.3   CALCIUM 8.6* 8.4* 8.5*   PROT 6.2 6.4 6.6   BILITOT 0.3 0.2 0.2   ALKPHOS 73 73 74   ALT 26 22 23   AST 37 38 43*   MG 2.0 2.1 1.9    No results for input(s): PH, PCO2, PO2, HCO3, POCSATURATED, BE in the last 168 hours.     POCT Glucose: HbA1c:    Recent Labs   Lab 09/24/22  1622 09/26/22  1922   POCTGLUCOSE 103 110    No results found for: HGBA1C     Cardiac Enzymes: Ejection Fractions:    No results for input(s): CPK, CPKMB, MB, TROPONINI in the last 72 hours.   No results found for: EF       No results for input(s): COLORU, APPEARANCEUA, PHUR, SPECGRAV, PROTEINUA, GLUCUA, KETONESU, BILIRUBINUA, OCCULTUA, NITRITE, UROBILINOGEN, LEUKOCYTESUR, RBCUA, WBCUA, BACTERIA, SQUAMEPITHEL, HYALINECASTS in the last 48 hours.    Invalid input(s): WRIGHTSUR      Lactate (Lactic Acid) (mmol/L)   Date Value   09/26/2022 0.9   09/24/2022 1.0     BNP (pg/mL)   Date Value   09/24/2022 <10     No results found for: CRP, SEDRATE  No results found for: DDIMER  No results found for: FERRITIN  No results found for: LDH  Troponin I (ng/mL)   Date Value   09/24/2022 <0.006   09/20/2022 <0.006     CPK (U/L)   Date Value   06/20/2006 106     No results found for this or any previous visit.  SARS-CoV-2 RNA, Amplification, Qual (no units)   Date Value   09/24/2022 Negative   09/20/2022 Negative     POC Rapid COVID (no units)    Date Value   02/05/2021 Negative       Microbiology labs for the last week  Microbiology Results (last 7 days)       Procedure Component Value Units Date/Time    Blood culture [[id number]] Collected: 09/26/22 1659    Order Status: Completed Specimen: Blood Updated: 09/27/22 1812     Blood Culture, Routine No Growth to date      No Growth to date    Blood culture [[id number]] Collected: 09/26/22 1659    Order Status: Completed Specimen: Blood Updated: 09/27/22 1812     Blood Culture, Routine No Growth to date      No Growth to date    Blood culture x two cultures. Draw prior to antibiotics. [[id number]] Collected: 09/24/22 1645    Order Status: Completed Specimen: Blood from Peripheral, Antecubital, Left Updated: 09/27/22 1812     Blood Culture, Routine No Growth to date      No Growth to date      No Growth to date      No Growth to date    Narrative:      Aerobic and anaerobic    Blood culture x two cultures. Draw prior to antibiotics. [[id number]] Collected: 09/24/22 1646    Order Status: Completed Specimen: Blood from Peripheral, Upper Arm, Left Updated: 09/27/22 1812     Blood Culture, Routine No Growth to date      No Growth to date      No Growth to date      No Growth to date    Narrative:      Aerobic and anaerobic    Urine culture [[id number]]  (Abnormal)  (Susceptibility) Collected: 09/24/22 1940    Order Status: Completed Specimen: Urine Updated: 09/26/22 1914     Urine Culture, Routine ESCHERICHIA COLI  10,000 - 49,999 cfu/ml      Narrative:      Specimen Source->Urine    Influenza A & B by Molecular [[id number]] Collected: 09/24/22 1657    Order Status: Completed Specimen: Nasopharyngeal Swab Updated: 09/24/22 1804     Influenza A, Molecular Negative     Influenza B, Molecular Negative     Flu A & B Source Nasal swab            Reviewed and noted in plan where applicable- Please see chart for full lab data.    Lines/Drains:       Peripheral IV - Single Lumen 09/24/22 1639 20 G Left Antecubital (Active)    Site Assessment Clean;Dry;Intact 09/25/22 0400   Line Status Flushed;Saline locked 09/25/22 0400   Dressing Status Intact;Dry;Clean 09/25/22 0400   Dressing Intervention Integrity maintained 09/25/22 0400   Number of days: 0       Imaging  ECG Results              EKG 12-lead (Final result)  Result time 09/25/22 16:25:01      Final result by Interface, Lab In [facility] (09/25/22 16:25:01)               Narrative:    Test Reason : AMS    Vent. Rate : 099 BPM     Atrial Rate : 099 BPM     P-R Int : 142 ms          QRS Dur : 068 ms      QT Int : 318 ms       P-R-T Axes : 063 000 061 degrees     QTc Int : 408 ms    Normal sinus rhythm  Normal ECG  When compared with ECG of 20-SEP-2022 08:56,  Questionable change in The axis  Confirmed by Salazar MARSHALL MD ([id number]) on 9/25/2022 4:24:54 PM    Referred By: [email or web address]   SELF           Confirmed By:Salazar MARSHALL MD                                  No results found for this or any previous visit.      US Retroperitoneal Complete  Narrative: EXAMINATION:  US RETROPERITONEAL COMPLETE    CLINICAL HISTORY:  UTI;    TECHNIQUE:  Ultrasound of the kidneys and urinary bladder was performed including color flow and Doppler evaluation of the kidneys.    COMPARISON:  None.    FINDINGS:  Right kidney: The right kidney measures 9.6 cm. No cortical thinning. No loss of corticomedullary distinction. Resistive index measures 0.76.  No mass. No renal stone. No hydronephrosis.    Left kidney: The left kidney measures 9.0 cm. No cortical thinning. No loss of corticomedullary distinction. Resistive index measures 0.70.  Simple cyst in the superior pole measuring 1.6 x 0.8 x 1.3 cm.  No mass. No renal stone. No hydronephrosis.    The bladder is partially distended at the time of scanning and has an unremarkable appearance.  Prostate measures 4.5 x 4.0 x 3.8 cm.  Impression: No acute sonographic abnormality.    Prostatomegaly.    Electronically signed by: Danis Topete  MD  Date:    09/26/2022  Time:    12:05      Labs, Imaging, EKG and Diagnostic results from 9/27/2022 were reviewed.    Medications:  Medication list was reviewed and changes noted under Assessment/Plan.  No current facility-administered medications on file prior to encounter.     Current Outpatient Medications on File Prior to Encounter   Medication Sig Dispense Refill    albuterol sulfate (VENTOLIN HFA INHL) Inhale into the lungs.      benzonatate (TESSALON) 200 MG capsule Take 1 capsule (200 mg total) by mouth 3 (three) times daily as needed for Cough. 20 capsule 0    budesonide-formoterol 160-4.5 mcg (SYMBICORT) 160-4.5 mcg/actuation HFAA Inhale 2 puffs into the lungs every 12 (twelve) hours. Controller      doxazosin (CARDURA) 2 MG tablet Take 2 mg by mouth every evening.      esomeprazole (NEXIUM) 20 MG capsule Take 20 mg by mouth before breakfast.      methylPREDNISolone (MEDROL DOSEPACK) 4 mg tablet As directed. 1 Package 0    naproxen sodium (ANAPROX) 220 MG tablet Take 220 mg by mouth every 12 (twelve) hours.      pantoprazole (PROTONIX) 40 MG tablet Take 40 mg by mouth once daily.      ranitidine (ZANTAC) 150 MG tablet Take 150 mg by mouth 2 (two) times daily.      tamsulosin (FLOMAX) 0.4 mg Cap Take 0.4 mg by mouth once daily.       Scheduled Medications:  ciprofloxacin HCl, 500 mg, Oral, Q12H  fluticasone furoate-vilanteroL, 1 puff, Inhalation, Daily  pantoprazole, 40 mg, Oral, Daily  tamsulosin, 0.4 mg, Oral, Daily    PRN: acetaminophen, albuterol-ipratropium, aluminum-magnesium hydroxide-simethicone, dextrose 10%, dextrose 10%, glucagon (human recombinant), glucose, glucose, influenza, melatonin, naloxone, ondansetron, sodium chloride 0.9%  Infusions:   Estimated Creatinine Clearance: 49.2 mL/min (based on SCr of 1.3 mg/dL).    Assessment/Plan:      * Sepsis  - SIRS 3/4 with UTI source  - sp 30ml/kg LR bolus in ED  - continue vanc and zosyn  - follow up Urine and blood cultures  - strict I/Os  -  monitor tele  - further management pending clinical course and future study review  9/25 fever of 102.6F yesterday.  sp 30ml/kg LR bolus. continue vanc and zosyn.  follow up Urine and blood cultures. COVID and flu negative. CX ray -No acute radiographic findings in the chest.. Zosyn discontinued. started on ceftriaxone   9/26 AMBROCIO resolved. fever of 102F overnight. UC - PRESUMPTIVE E COLI 10,000 - 49,999 cfu/ml Identification and susceptibility pending . vancomycin discontinued. H/O BPH with incomplete emptying on tamsulosin. renal sonogram orderd . BCX2 9/24 NGTD  9/27 UC with pansensitive E coli. Fever of 103F yesterday. BC x2 repeated.  started on oral cipro 500mg q 12h.           CKD (chronic kidney disease)  ? with AMBROCIO Creatine stable for now. BMP reviewed- noted Estimated Creatinine Clearance: 45.7 mL/min (based on SCr of 1.4 mg/dL). according to latest data. Monitor UOP and serial BMP and adjust therapy as needed. Renally dose meds.    Recent Labs   Lab 09/24/22  1645 09/25/22  0337 09/26/22  0439   BUN 17 18 13   CREATININE 1.6* 1.7* 1.4      9/25  orthostatics negative  9/26 AMBROCIO resolved.       Leucopenia  9/25 WBC count 3.3.  ANC 2K. monitor       COPD (chronic obstructive pulmonary disease)  - continue home LABA-ICS  - duonebs prn      BPH (benign prostatic hyperplasia)  - continue home tamsulosin       UTI (urinary tract infection)  - see sepsis above      VTE Risk Mitigation (From admission, onward)           Ordered     IP VTE LOW RISK PATIENT  Once         09/24/22 2100     Place sequential compression device  Until discontinued         09/24/22 2100                    Discharge Planning   KAPIL: 9/28/2022     Code Status: Full Code   Is the patient medically ready for discharge?: No    Reason for patient still in hospital (select all that apply): Treatment  Discharge Plan A: Home                  Luis Soto MD  Department of Hospital Medicine   [facility] S[facility]

## 2022-09-27 NOTE — NURSING
Secure chat with team N due to requiring tele boxes for other pt coming from ED,  and order noted to have box can be D/C on this Pt.

## 2022-09-27 NOTE — PLAN OF CARE
Problem: Adult Inpatient Plan of Care  Goal: Plan of Care Review  Outcome: Ongoing, Progressing     Problem: Adult Inpatient Plan of Care  Goal: Absence of Hospital-Acquired Illness or Injury  Outcome: Ongoing, Progressing     Problem: Adjustment to Illness (Sepsis/Septic Shock)  Goal: Optimal Coping  Outcome: Ongoing, Progressing     Problem: Bleeding (Sepsis/Septic Shock)  Goal: Absence of Bleeding  Outcome: Ongoing, Progressing   Pt is A,A,O x 4 . Stable V/S. No C/O pain or discomfort . Pt slept between care . No falls or injuries per day. IV abx switched to oral .

## 2022-09-28 LAB
ALBUMIN SERPL BCP-MCNC: 3 G/DL (ref 3.5–5.2)
ALP SERPL-CCNC: 73 U/L (ref 55–135)
ALT SERPL W/O P-5'-P-CCNC: 27 U/L (ref 10–44)
ANION GAP SERPL CALC-SCNC: 10 MMOL/L (ref 8–16)
AST SERPL-CCNC: 48 U/L (ref 10–40)
BASOPHILS # BLD AUTO: 0.01 K/UL (ref 0–0.2)
BASOPHILS NFR BLD: 0.4 % (ref 0–1.9)
BILIRUB SERPL-MCNC: 0.3 MG/DL (ref 0.1–1)
BUN SERPL-MCNC: 12 MG/DL (ref 8–23)
CALCIUM SERPL-MCNC: 8.7 MG/DL (ref 8.7–10.5)
CHLORIDE SERPL-SCNC: 100 MMOL/L (ref 95–110)
CO2 SERPL-SCNC: 23 MMOL/L (ref 23–29)
CREAT SERPL-MCNC: 1.2 MG/DL (ref 0.5–1.4)
DIFFERENTIAL METHOD: ABNORMAL
EOSINOPHIL # BLD AUTO: 0 K/UL (ref 0–0.5)
EOSINOPHIL NFR BLD: 0 % (ref 0–8)
ERYTHROCYTE [DISTWIDTH] IN BLOOD BY AUTOMATED COUNT: 13.1 % (ref 11.5–14.5)
EST. GFR  (NO RACE VARIABLE): >60 ML/MIN/1.73 M^2
GLUCOSE SERPL-MCNC: 98 MG/DL (ref 70–110)
HCT VFR BLD AUTO: 39.8 % (ref 40–54)
HGB BLD-MCNC: 13.5 G/DL (ref 14–18)
HYPOCHROMIA BLD QL SMEAR: ABNORMAL
IMM GRANULOCYTES # BLD AUTO: 0.01 K/UL (ref 0–0.04)
IMM GRANULOCYTES NFR BLD AUTO: 0.4 % (ref 0–0.5)
LYMPHOCYTES # BLD AUTO: 0.8 K/UL (ref 1–4.8)
LYMPHOCYTES NFR BLD: 32.4 % (ref 18–48)
MAGNESIUM SERPL-MCNC: 1.9 MG/DL (ref 1.6–2.6)
MCH RBC QN AUTO: 31 PG (ref 27–31)
MCHC RBC AUTO-ENTMCNC: 33.9 G/DL (ref 32–36)
MCV RBC AUTO: 92 FL (ref 82–98)
MONOCYTES # BLD AUTO: 0.2 K/UL (ref 0.3–1)
MONOCYTES NFR BLD: 7.1 % (ref 4–15)
NEUTROPHILS # BLD AUTO: 1.5 K/UL (ref 1.8–7.7)
NEUTROPHILS NFR BLD: 59.7 % (ref 38–73)
NRBC BLD-RTO: 0 /100 WBC
PLATELET # BLD AUTO: 196 K/UL (ref 150–450)
PLATELET BLD QL SMEAR: ABNORMAL
PMV BLD AUTO: 9.5 FL (ref 9.2–12.9)
POTASSIUM SERPL-SCNC: 4.3 MMOL/L (ref 3.5–5.1)
PROT SERPL-MCNC: 6.7 G/DL (ref 6–8.4)
RBC # BLD AUTO: 4.35 M/UL (ref 4.6–6.2)
SODIUM SERPL-SCNC: 133 MMOL/L (ref 136–145)
WBC # BLD AUTO: 2.53 K/UL (ref 3.9–12.7)

## 2022-09-28 PROCEDURE — 99232 PR SUBSEQUENT HOSPITAL CARE,LEVL II: ICD-10-PCS | Mod: ,,, | Performed by: HOSPITALIST

## 2022-09-28 PROCEDURE — 11000001 HC ACUTE MED/SURG PRIVATE ROOM

## 2022-09-28 PROCEDURE — 25000003 PHARM REV CODE 250: Performed by: HOSPITALIST

## 2022-09-28 PROCEDURE — 80053 COMPREHEN METABOLIC PANEL: CPT | Performed by: HOSPITALIST

## 2022-09-28 PROCEDURE — 25000003 PHARM REV CODE 250: Performed by: FAMILY MEDICINE

## 2022-09-28 PROCEDURE — 83735 ASSAY OF MAGNESIUM: CPT | Performed by: HOSPITALIST

## 2022-09-28 PROCEDURE — 99232 SBSQ HOSP IP/OBS MODERATE 35: CPT | Mod: ,,, | Performed by: HOSPITALIST

## 2022-09-28 PROCEDURE — 85025 COMPLETE CBC W/AUTO DIFF WBC: CPT | Performed by: HOSPITALIST

## 2022-09-28 PROCEDURE — 36415 COLL VENOUS BLD VENIPUNCTURE: CPT | Performed by: HOSPITALIST

## 2022-09-28 RX ADMIN — CIPROFLOXACIN 500 MG: 500 TABLET, FILM COATED ORAL at 08:09

## 2022-09-28 RX ADMIN — PANTOPRAZOLE SODIUM 40 MG: 40 TABLET, DELAYED RELEASE ORAL at 08:09

## 2022-09-28 RX ADMIN — TAMSULOSIN HYDROCHLORIDE 0.4 MG: 0.4 CAPSULE ORAL at 08:09

## 2022-09-28 NOTE — PLAN OF CARE
Spoke with patient in the room.  He lives by himself at the address on the facesheet. No home health. No dme. No o2. Iadl's.  Drove himself here.     DCP: home    Kailee Ospina RN [facility] [phone]

## 2022-09-28 NOTE — PROGRESS NOTES
[facility] Medicine  Progress Note    Patient Name: Darshan Hoskins  MRN: [id number]  Patient Class: IP- Inpatient   Admission Date: 9/24/2022  Length of Stay: 4 days  Attending Physician: Luis Soto MD  Primary Care Provider: Prashant        Subjective:     Principal Problem:Sepsis        HPI:  This is a 60yo male with a past medical history of BPH, COPD, and GERD who presents to the ED with chief complaint of decreased appetites, fatigue and pre-syncopal symptoms. Patient states that he was feeling well and in his regular state of health until 4-5 days ago when he started developing new symptoms of fatigue and decreased appetite. Denies nausea or vomiting. States that yesterday and today he started having dizziness with standing up and feeling weak with minimal ambulation. In the ED patient Temp 102.6, tachycardic, hemodynamically stable saturating well on room air. WBC 3.31, LA 1.0. UA consistent with UTI. Patient started on 30ml/kg IVF bolus and vanc and zosyn for urinary sepsis and admitted to the [facility] of medicine for further evaluation and management.      Overview/Hospital Course:  9/25 fever of 102.6F yesterday.  sp 30ml/kg LR bolus. continue vanc and zosyn.  follow up Urine and blood cultures. COVID and flu negative. CX ray -No acute radiographic findings in the chest.. Zosyn discontinued. statted on ceftriaxone . orthostatics negative  9/26 AMBROCIO resolved. fever of 102F overnight. UC - PRESUMPTIVE E COLI 10,000 - 49,999 cfu/ml Identification and susceptibility pending . vancomycin discontinued. H/O BPH with incomplete emptying on tamsulosin. r. BCX2 9/24 NGTD . renal sonogram - No acute sonographic abnormality. Prostatomegaly.  9/27 UC with pansensitive E coli. Fever of 103F yesterday. BC x2 repeated.  started on oral cipro 500mg q 12h.   9/28 afebrile yesterday. likely discharge if BC x 2 remain negative                   Review of Systems:   Pain scale:     Constitutional:  fever,  chills, headache, vision loss, hearing loss, weight loss, Generalized weakness, falls, loss of smell, loss of taste, poor appetite,  sore throat  Respiratory: cough, shortness of breath.   Cardiovascular: chest pain, dizziness -resolved , palpitations, orthopnea, swelling of feet, syncope  Gastrointestinal: nausea, vomiting, abdominal pain, diarrhea, black stool,  blood in stool, change in bowel habits  Genitourinary: hematuria, dysuria, urgency, frequency  Integument/Breast: rash,  pruritis  Hematologic/Lymphatic: easy bruising, lymphadenopathy  Musculoskeletal: arthralgias , myalgias, back pain, neck pain, knee pain  Neurological: confusion, seizures, tremors, slurred speech  Behavioral/Psych:  depression, anxiety, auditory or visual hallucinations     OBJECTIVE:     Physical Exam:  Body mass index is 22.28 kg/m².    Constitutional: Appears well-developed and well-nourished.   Head: Normocephalic and atraumatic.   Neck: Normal range of motion. Neck supple.   Cardiovascular: Normal heart rate.  Regular heart rhythm.  Pulmonary/Chest: Effort normal.   Abdominal: No distension.  No tenderness  Musculoskeletal: Normal range of motion. No edema.   Neurological: Alert and oriented to person, place, and time.   Skin: Skin is warm and dry.   Psychiatric: Normal mood and affect. Behavior is normal.                  Vital Signs  Temp: 98 °F (36.7 °C) (09/28/22 0725)  Pulse: 93 (09/28/22 0725)  Resp: 18 (09/28/22 0725)  BP: 93/63 (09/28/22 0725)  SpO2: (Abnormal) 91 % (09/28/22 0725)     24 Hour VS Range    Temp:  [98 °F (36.7 °C)-99.9 °F (37.7 °C)]   Pulse:  [91-95]   Resp:  [16-18]   BP: ()/(60-73)   SpO2:  [90 %-95 %]     Intake/Output Summary (Last 24 hours) at 9/28/2022 1522  Last data filed at 9/27/2022 1824  Gross per 24 hour   Intake 220 ml   Output no documentation   Net 220 ml           I/O This Shift:  No intake/output data recorded.    Wt Readings from Last 3 Encounters:    09/25/22 59.8 kg (131 lb 13.4 oz)   09/20/22 60.8 kg (134 lb 0.6 oz)   02/05/21 60.8 kg (134 lb)       I have personally reviewed the vitals and recorded Intake/Output     Laboratory/Diagnostic Data:    CBC/Anemia Labs: Coags:    Recent Labs   Lab 09/26/22  0439 09/27/22  0453 09/28/22  0857   WBC 2.80* 3.20* 2.53*   HGB 12.9* 13.2* 13.5*   HCT 37.7* 38.9* 39.8*    173 196   MCV 93 92 92   RDW 13.2 13.3 13.1    No results for input(s): PT, INR, APTT in the last 168 hours.     Chemistries: ABG:   Recent Labs   Lab 09/26/22 0439 09/27/22  0453 09/28/22  0857   * 134* 133*   K 4.0 4.3 4.3    101 100   CO2 24 22* 23   BUN 13 10 12   CREATININE 1.4 1.3 1.2   CALCIUM 8.4* 8.5* 8.7   PROT 6.4 6.6 6.7   BILITOT 0.2 0.2 0.3   ALKPHOS 73 74 73   ALT 22 23 27   AST 38 43* 48*   MG 2.1 1.9 1.9    No results for input(s): PH, PCO2, PO2, HCO3, POCSATURATED, BE in the last 168 hours.     POCT Glucose: HbA1c:    Recent Labs   Lab 09/24/22  1622 09/26/22  1922   POCTGLUCOSE 103 110    No results found for: HGBA1C     Cardiac Enzymes: Ejection Fractions:    No results for input(s): CPK, CPKMB, MB, TROPONINI in the last 72 hours.   No results found for: EF       No results for input(s): COLORU, APPEARANCEUA, PHUR, SPECGRAV, PROTEINUA, GLUCUA, KETONESU, BILIRUBINUA, OCCULTUA, NITRITE, UROBILINOGEN, LEUKOCYTESUR, RBCUA, WBCUA, BACTERIA, SQUAMEPITHEL, HYALINECASTS in the last 48 hours.    Invalid input(s): WRIGHTSUR      Lactate (Lactic Acid) (mmol/L)   Date Value   09/26/2022 0.9   09/24/2022 1.0     BNP (pg/mL)   Date Value   09/24/2022 <10     No results found for: CRP, SEDRATE  No results found for: DDIMER  No results found for: FERRITIN  No results found for: LDH  Troponin I (ng/mL)   Date Value   09/24/2022 <0.006   09/20/2022 <0.006     CPK (U/L)   Date Value   06/20/2006 106     No results found for this or any previous visit.  SARS-CoV-2 RNA, Amplification, Qual (no units)   Date Value   09/24/2022 Negative    09/20/2022 Negative     POC Rapid COVID (no units)   Date Value   02/05/2021 Negative       Microbiology labs for the last week  Microbiology Results (last 7 days)       Procedure Component Value Units Date/Time    Blood culture [[id number]] Collected: 09/26/22 1659    Order Status: Completed Specimen: Blood Updated: 09/27/22 1812     Blood Culture, Routine No Growth to date      No Growth to date    Blood culture [[id number]] Collected: 09/26/22 1659    Order Status: Completed Specimen: Blood Updated: 09/27/22 1812     Blood Culture, Routine No Growth to date      No Growth to date    Blood culture x two cultures. Draw prior to antibiotics. [[id number]] Collected: 09/24/22 1645    Order Status: Completed Specimen: Blood from Peripheral, Antecubital, Left Updated: 09/27/22 1812     Blood Culture, Routine No Growth to date      No Growth to date      No Growth to date      No Growth to date    Narrative:      Aerobic and anaerobic    Blood culture x two cultures. Draw prior to antibiotics. [[id number]] Collected: 09/24/22 1646    Order Status: Completed Specimen: Blood from Peripheral, Upper Arm, Left Updated: 09/27/22 1812     Blood Culture, Routine No Growth to date      No Growth to date      No Growth to date      No Growth to date    Narrative:      Aerobic and anaerobic    Urine culture [[id number]]  (Abnormal)  (Susceptibility) Collected: 09/24/22 1940    Order Status: Completed Specimen: Urine Updated: 09/26/22 1914     Urine Culture, Routine ESCHERICHIA COLI  10,000 - 49,999 cfu/ml      Narrative:      Specimen Source->Urine    Influenza A & B by Molecular [[id number]] Collected: 09/24/22 1657    Order Status: Completed Specimen: Nasopharyngeal Swab Updated: 09/24/22 1804     Influenza A, Molecular Negative     Influenza B, Molecular Negative     Flu A & B Source Nasal swab            Reviewed and noted in plan where applicable- Please see chart for full lab data.    Lines/Drains:       Peripheral IV - Single  Lumen 09/24/22 1639 20 G Left Antecubital (Active)   Site Assessment Clean;Dry;Intact 09/25/22 0400   Line Status Flushed;Saline locked 09/25/22 0400   Dressing Status Intact;Dry;Clean 09/25/22 0400   Dressing Intervention Integrity maintained 09/25/22 0400   Number of days: 0       Imaging  ECG Results              EKG 12-lead (Final result)  Result time 09/25/22 16:25:01      Final result by Interface, Lab In [facility] (09/25/22 16:25:01)               Narrative:    Test Reason : AMS    Vent. Rate : 099 BPM     Atrial Rate : 099 BPM     P-R Int : 142 ms          QRS Dur : 068 ms      QT Int : 318 ms       P-R-T Axes : 063 000 061 degrees     QTc Int : 408 ms    Normal sinus rhythm  Normal ECG  When compared with ECG of 20-SEP-2022 08:56,  Questionable change in The axis  Confirmed by Salazar MARSHALL MD ([id number]) on 9/25/2022 4:24:54 PM    Referred By: [email or web address]   SELF           Confirmed By:Salazar MARSHALL MD                                  No results found for this or any previous visit.      US Retroperitoneal Complete  Narrative: EXAMINATION:  US RETROPERITONEAL COMPLETE    CLINICAL HISTORY:  UTI;    TECHNIQUE:  Ultrasound of the kidneys and urinary bladder was performed including color flow and Doppler evaluation of the kidneys.    COMPARISON:  None.    FINDINGS:  Right kidney: The right kidney measures 9.6 cm. No cortical thinning. No loss of corticomedullary distinction. Resistive index measures 0.76.  No mass. No renal stone. No hydronephrosis.    Left kidney: The left kidney measures 9.0 cm. No cortical thinning. No loss of corticomedullary distinction. Resistive index measures 0.70.  Simple cyst in the superior pole measuring 1.6 x 0.8 x 1.3 cm.  No mass. No renal stone. No hydronephrosis.    The bladder is partially distended at the time of scanning and has an unremarkable appearance.  Prostate measures 4.5 x 4.0 x 3.8 cm.  Impression: No acute sonographic abnormality.    Prostatomegaly.    Electronically signed  by: Danis Topete MD  Date:    09/26/2022  Time:    12:05      Labs, Imaging, EKG and Diagnostic results from 9/28/2022 were reviewed.    Medications:  Medication list was reviewed and changes noted under Assessment/Plan.  No current facility-administered medications on file prior to encounter.     Current Outpatient Medications on File Prior to Encounter   Medication Sig Dispense Refill    albuterol sulfate (VENTOLIN HFA INHL) Inhale into the lungs.      benzonatate (TESSALON) 200 MG capsule Take 1 capsule (200 mg total) by mouth 3 (three) times daily as needed for Cough. 20 capsule 0    budesonide-formoterol 160-4.5 mcg (SYMBICORT) 160-4.5 mcg/actuation HFAA Inhale 2 puffs into the lungs every 12 (twelve) hours. Controller      doxazosin (CARDURA) 2 MG tablet Take 2 mg by mouth every evening.      esomeprazole (NEXIUM) 20 MG capsule Take 20 mg by mouth before breakfast.      methylPREDNISolone (MEDROL DOSEPACK) 4 mg tablet As directed. 1 Package 0    naproxen sodium (ANAPROX) 220 MG tablet Take 220 mg by mouth every 12 (twelve) hours.      pantoprazole (PROTONIX) 40 MG tablet Take 40 mg by mouth once daily.      ranitidine (ZANTAC) 150 MG tablet Take 150 mg by mouth 2 (two) times daily.      tamsulosin (FLOMAX) 0.4 mg Cap Take 0.4 mg by mouth once daily.       Scheduled Medications:  ciprofloxacin HCl, 500 mg, Oral, Q12H  fluticasone furoate-vilanteroL, 1 puff, Inhalation, Daily  pantoprazole, 40 mg, Oral, Daily  tamsulosin, 0.4 mg, Oral, Daily    PRN: acetaminophen, albuterol-ipratropium, aluminum-magnesium hydroxide-simethicone, dextrose 10%, dextrose 10%, glucagon (human recombinant), glucose, glucose, influenza, melatonin, naloxone, ondansetron, sodium chloride 0.9%  Infusions:   Estimated Creatinine Clearance: 53.3 mL/min (based on SCr of 1.2 mg/dL).    Assessment/Plan:      * Sepsis  - SIRS 3/4 with UTI source  - sp 30ml/kg LR bolus in ED  - continue vanc and zosyn  - follow up Urine and blood cultures  -  strict I/Os  - monitor tele  - further management pending clinical course and future study review  9/25 fever of 102.6F yesterday.  sp 30ml/kg LR bolus. continue vanc and zosyn.  follow up Urine and blood cultures. COVID and flu negative. CX ray -No acute radiographic findings in the chest.. Zosyn discontinued. started on ceftriaxone   9/26 AMBROCIO resolved. fever of 102F overnight. UC - PRESUMPTIVE E COLI 10,000 - 49,999 cfu/ml Identification and susceptibility pending . vancomycin discontinued. H/O BPH with incomplete emptying on tamsulosin. renal sonogram orderd . BCX2 9/24 NGTD  9/27 UC with pansensitive E coli. Fever of 103F yesterday. BC x2 repeated.  started on oral cipro 500mg q 12h.   9/28 afebrile yesterday. likely discharge if BC x 2 remain negative          CKD (chronic kidney disease)  ? with AMBROCIO Creatine stable for now. BMP reviewed- noted Estimated Creatinine Clearance: 45.7 mL/min (based on SCr of 1.4 mg/dL). according to latest data. Monitor UOP and serial BMP and adjust therapy as needed. Renally dose meds.    Recent Labs   Lab 09/24/22  1645 09/25/22  0337 09/26/22  0439   BUN 17 18 13   CREATININE 1.6* 1.7* 1.4      9/25  orthostatics negative  9/26 AMBROCIO resolved.       Leucopenia  9/25 WBC count 3.3.  ANC 2K. monitor       COPD (chronic obstructive pulmonary disease)  - continue home LABA-ICS  - duonebs prn      BPH (benign prostatic hyperplasia)  - continue home tamsulosin       UTI (urinary tract infection)  - see sepsis above      VTE Risk Mitigation (From admission, onward)           Ordered     IP VTE LOW RISK PATIENT  Once         09/24/22 2100     Place sequential compression device  Until discontinued         09/24/22 2100                    Discharge Planning   KAPIL: 9/28/2022     Code Status: Full Code   Is the patient medically ready for discharge?: No    Reason for patient still in hospital (select all that apply): Treatment  Discharge Plan A: Home                  Luis Soto  MD  Department of Hospital Medicine   [facility]

## 2022-09-28 NOTE — DISCHARGE SUMMARY
[facility] Medicine  Discharge Summary      Patient Name: Darshan Hoskins  MRN: [id number]  Patient Class: IP- Inpatient  Admission Date: 9/24/2022  Hospital Length of Stay: 5 days  Discharge Date and Time:  09/29/2022 7:46 AM  Attending Physician: Luis Soto MD   Discharging Provider: Luis Soto MD  Primary Care Provider: [facility] Medicine Team: [facility] MED N Luis Soto MD    HPI:   This is a 62yo male with a past medical history of BPH, COPD, and GERD who presents to the ED with chief complaint of decreased appetites, fatigue and pre-syncopal symptoms. Patient states that he was feeling well and in his regular state of health until 4-5 days ago when he started developing new symptoms of fatigue and decreased appetite. Denies nausea or vomiting. States that yesterday and today he started having dizziness with standing up and feeling weak with minimal ambulation. In the ED patient Temp 102.6, tachycardic, hemodynamically stable saturating well on room air. WBC 3.31, LA 1.0. UA consistent with UTI. Patient started on 30ml/kg IVF bolus and vanc and zosyn for urinary sepsis and admitted to the care of medicine for further evaluation and management.      * No surgery found *      Hospital Course:   9/25 fever of 102.6F yesterday.  sp 30ml/kg LR bolus. continue vanc and zosyn.  follow up Urine and blood cultures. COVID and flu negative. CX ray -No acute radiographic findings in the chest.. Zosyn discontinued. statted on ceftriaxone . orthostatics negative  9/26 AMBROCIO resolved. fever of 102F overnight. UC - PRESUMPTIVE E COLI 10,000 - 49,999 cfu/ml Identification and susceptibility pending . vancomycin discontinued. H/O BPH with incomplete emptying on tamsulosin. r. BCX2 9/24 NGTD . renal sonogram - No acute sonographic abnormality. Prostatomegaly.  9/27 UC with pansensitive E coli. Fever of 103F yesterday. BC x2 repeated.  started on oral cipro 500mg q  12h.   9/28 afebrile yesterday. likely discharge if BC x 2 remain negative  9/29 discharged home with 10 day course of oral  ciprofloxacin                 Goals of Care Treatment Preferences:  Code Status: Full Code      Consults:   Consults (From admission, onward)          Status Ordering Provider     Inpatient consult to Social Work/Case Management  Once        Provider:  (Not yet assigned)    Acknowledged CARITO ORANTES          Assessment/Plan:      * Sepsis  - SIRS 3/4 with UTI source  - sp 30ml/kg LR bolus in ED  - continue vanc and zosyn  - follow up Urine and blood cultures  - strict I/Os  - monitor tele  - further management pending clinical course and future study review  9/25 fever of 102.6F yesterday.  sp 30ml/kg LR bolus. continue vanc and zosyn.  follow up Urine and blood cultures. COVID and flu negative. CX ray -No acute radiographic findings in the chest.. Zosyn discontinued. started on ceftriaxone   9/26 AMBROCIO resolved. fever of 102F overnight. UC - PRESUMPTIVE E COLI 10,000 - 49,999 cfu/ml Identification and susceptibility pending . vancomycin discontinued. H/O BPH with incomplete emptying on tamsulosin. renal sonogram orderd . BCX2 9/24 NGTD  9/27 UC with pansensitive E coli. Fever of 103F yesterday. BC x2 repeated.  started on oral cipro 500mg q 12h.   9/28 afebrile yesterday. likely discharge if BC x 2 remain negative  9/29 discharged home with 10 day course of oral  ciproflaoxacin            CKD (chronic kidney disease)  ? with AMBROCIO Creatine stable for now. BMP reviewed- noted Estimated Creatinine Clearance: 45.7 mL/min (based on SCr of 1.4 mg/dL). according to latest data. Monitor UOP and serial BMP and adjust therapy as needed. Renally dose meds.           Recent Labs   Lab 09/24/22  1645 09/25/22  0337 09/26/22  0439   BUN 17 18 13   CREATININE 1.6* 1.7* 1.4       9/25  orthostatics negative  9/26 AMBROCIO resolved.        Leucopenia  9/25 WBC count 3.3.  ANC 2K. monitor         COPD (chronic  obstructive pulmonary disease)  - continue home LABA-ICS  - duonebs prn        BPH (benign prostatic hyperplasia)  - continue home tamsulosin         UTI (urinary tract infection)  - see sepsis above            Final Active Diagnoses:    Diagnosis Date Noted POA    PRINCIPAL PROBLEM:  Sepsis [A41.9] 09/24/2022 Unknown    Leucopenia [D72.819] 09/25/2022 Yes    CKD (chronic kidney disease) [N18.9] 09/25/2022 Yes    UTI (urinary tract infection) [N39.0] 09/24/2022 Unknown    BPH (benign prostatic hyperplasia) [N40.0] 09/24/2022 Unknown    COPD (chronic obstructive pulmonary disease) [J44.9] 09/24/2022 Unknown      Problems Resolved During this Admission:    Diagnosis Date Noted Date Resolved POA    AMBROCIO (acute kidney injury) [N17.9] 09/26/2022 09/26/2022 Yes       Medications:  Reconciled Home Medications:      Medication List        Start taking these medications      ciprofloxacin HCl 500 MG tablet  Commonly known as: CIPRO  Take 1 tablet (500 mg total) by mouth every 12 (twelve) hours. for 8 days            Continue taking these medications      benzonatate 200 MG capsule  Commonly known as: TESSALON PERLES  Take 1 capsule (200 mg total) by mouth 3 (three) times daily as needed for Cough.     budesonide-formoterol 160-4.5 mcg 160-4.5 mcg/actuation Hfaa  Commonly known as: SYMBICORT  Inhale 2 puffs into the lungs every 12 (twelve) hours. Controller     esomeprazole 20 MG capsule  Commonly known as: NEXIUM  Take 20 mg by mouth before breakfast.     tamsulosin 0.4 mg Cap  Commonly known as: FLOMAX  Take 0.4 mg by mouth once daily.     VENTOLIN HFA INHL  Inhale into the lungs.            Stop taking these medications      doxazosin 2 MG tablet  Commonly known as: CARDURA     methylPREDNISolone 4 mg tablet  Commonly known as: MEDROL DOSEPACK     naproxen sodium 220 MG tablet  Commonly known as: ANAPROX     pantoprazole 40 MG tablet  Commonly known as: PROTONIX     ranitidine 150 MG tablet  Commonly known as: ZANTAC                 Discharged Condition: fair    Disposition: Home or Self Care          Follow Up:     Patient Instructions:   No discharge procedures on file.    Laboratory/Diagnostic Data:    CBC/Anemia Labs: Coags:    Recent Labs   Lab 09/26/22  0439 09/27/22 0453 09/28/22  0857   WBC 2.80* 3.20* 2.53*   HGB 12.9* 13.2* 13.5*   HCT 37.7* 38.9* 39.8*    173 196   MCV 93 92 92   RDW 13.2 13.3 13.1    No results for input(s): PT, INR, APTT in the last 168 hours.     Chemistries: ABG:   Recent Labs   Lab 09/26/22 0439 09/27/22 0453 09/28/22  0857   * 134* 133*   K 4.0 4.3 4.3    101 100   CO2 24 22* 23   BUN 13 10 12   CREATININE 1.4 1.3 1.2   CALCIUM 8.4* 8.5* 8.7   PROT 6.4 6.6 6.7   BILITOT 0.2 0.2 0.3   ALKPHOS 73 74 73   ALT 22 23 27   AST 38 43* 48*   MG 2.1 1.9 1.9    No results for input(s): PH, PCO2, PO2, HCO3, POCSATURATED, BE in the last 168 hours.     POCT Glucose: HbA1c:    Recent Labs   Lab 09/24/22  1622 09/26/22 1922   POCTGLUCOSE 103 110    No results found for: HGBA1C     Cardiac Enzymes: Ejection Fractions:    No results for input(s): CPK, CPKMB, MB, TROPONINI in the last 72 hours. No results found for: EF       No results for input(s): COLORU, APPEARANCEUA, PHUR, SPECGRAV, PROTEINUA, GLUCUA, KETONESU, BILIRUBINUA, OCCULTUA, NITRITE, UROBILINOGEN, LEUKOCYTESUR, RBCUA, WBCUA, BACTERIA, SQUAMEPITHEL, HYALINECASTS in the last 48 hours.    Invalid input(s): WRIGHTSUR    Lactate (Lactic Acid) (mmol/L)   Date Value   09/26/2022 0.9   09/24/2022 1.0     BNP (pg/mL)   Date Value   09/24/2022 <10     No results found for: CRP, SEDRATE  No results found for: DDIMER  No results found for: FERRITIN  No results found for: LDH  Troponin I (ng/mL)   Date Value   09/24/2022 <0.006   09/20/2022 <0.006     CPK (U/L)   Date Value   06/20/2006 106     No results found for this or any previous visit.  SARS-CoV-2 RNA, Amplification, Qual (no units)   Date Value   09/24/2022 Negative   09/20/2022 Negative      POC Rapid COVID (no units)   Date Value   02/05/2021 Negative       Microbiology labs for the last week  Microbiology Results (last 7 days)       Procedure Component Value Units Date/Time    Blood culture [[id number]] Collected: 09/26/22 1659    Order Status: Completed Specimen: Blood Updated: 09/28/22 1812     Blood Culture, Routine No Growth to date      No Growth to date      No Growth to date    Blood culture [[id number]] Collected: 09/26/22 1659    Order Status: Completed Specimen: Blood Updated: 09/28/22 1812     Blood Culture, Routine No Growth to date      No Growth to date      No Growth to date    Blood culture x two cultures. Draw prior to antibiotics. [[id number]] Collected: 09/24/22 1645    Order Status: Completed Specimen: Blood from Peripheral, Antecubital, Left Updated: 09/28/22 1812     Blood Culture, Routine No Growth to date      No Growth to date      No Growth to date      No Growth to date      No Growth to date    Narrative:      Aerobic and anaerobic    Blood culture x two cultures. Draw prior to antibiotics. [[id number]] Collected: 09/24/22 1646    Order Status: Completed Specimen: Blood from Peripheral, Upper Arm, Left Updated: 09/28/22 1812     Blood Culture, Routine No Growth to date      No Growth to date      No Growth to date      No Growth to date      No Growth to date    Narrative:      Aerobic and anaerobic    Urine culture [[id number]]  (Abnormal)  (Susceptibility) Collected: 09/24/22 1940    Order Status: Completed Specimen: Urine Updated: 09/26/22 1914     Urine Culture, Routine ESCHERICHIA COLI  10,000 - 49,999 cfu/ml      Narrative:      Specimen Source->Urine    Influenza A & B by Molecular [[id number]] Collected: 09/24/22 1657    Order Status: Completed Specimen: Nasopharyngeal Swab Updated: 09/24/22 1804     Influenza A, Molecular Negative     Influenza B, Molecular Negative     Flu A & B Source Nasal swab              Reviewed and noted in plan where applicable- Please see  chart for full lab data.    Lines/Drains:       Peripheral IV - Single Lumen 09/27/22 0553 20 G Anterior;Right Upper Arm (Active)   Site Assessment Clean;Dry;Intact;No redness;No swelling 09/27/22 2300   Extremity Assessment Distal to IV No warmth;No swelling;No redness;No abnormal discoloration 09/27/22 2300   Line Status Saline locked 09/27/22 2300   Dressing Status Clean;Dry;Intact 09/27/22 2300   Dressing Intervention Integrity maintained 09/27/22 2300   Dressing Change Due 10/01/22 09/27/22 2300   Site Change Due 10/01/22 09/27/22 2300   Reason Not Rotated Not due 09/27/22 2300   Number of days: 1       Imaging  ECG Results              EKG 12-lead (Final result)  Result time 09/25/22 16:25:01      Final result by Interface, Lab In [facility] (09/25/22 16:25:01)               Narrative:    Test Reason : AMS    Vent. Rate : 099 BPM     Atrial Rate : 099 BPM     P-R Int : 142 ms          QRS Dur : 068 ms      QT Int : 318 ms       P-R-T Axes : 063 000 061 degrees     QTc Int : 408 ms    Normal sinus rhythm  Normal ECG  When compared with ECG of 20-SEP-2022 08:56,  Questionable change in The axis  Confirmed by Salazar MARSHALL MD ([id number]) on 9/25/2022 4:24:54 PM    Referred By: [email or web address]   SELF           Confirmed By:Salazar MARSHALL MD                                  No results found for this or any previous visit.      US Retroperitoneal Complete  Narrative: EXAMINATION:  US RETROPERITONEAL COMPLETE    CLINICAL HISTORY:  UTI;    TECHNIQUE:  Ultrasound of the kidneys and urinary bladder was performed including color flow and Doppler evaluation of the kidneys.    COMPARISON:  None.    FINDINGS:  Right kidney: The right kidney measures 9.6 cm. No cortical thinning. No loss of corticomedullary distinction. Resistive index measures 0.76.  No mass. No renal stone. No hydronephrosis.    Left kidney: The left kidney measures 9.0 cm. No cortical thinning. No loss of corticomedullary distinction. Resistive index measures 0.70.  Simple  cyst in the superior pole measuring 1.6 x 0.8 x 1.3 cm.  No mass. No renal stone. No hydronephrosis.    The bladder is partially distended at the time of scanning and has an unremarkable appearance.  Prostate measures 4.5 x 4.0 x 3.8 cm.  Impression: No acute sonographic abnormality.    Prostatomegaly.    Electronically signed by: Danis Topete MD  Date:    09/26/2022  Time:    12:05      Imaging:  Imaging Results              CTA Head and Neck (xpd) (Final result)  Result time 09/24/22 18:20:57      Final result by Austyn Aguirre MD (09/24/22 18:20:57)               Impression:      1. No acute intracranial findings.  2. CTA head and neck without large vessel occlusion or high-grade stenosis.  3. Centrilobular emphysema with biapical bullous disease.  Pleural based irregular opacity in the right upper lobe, possible subsegmental atelectasis or fibrotic change.  Recommend non-emergent dedicated noncontrast CT chest.  4. Additional findings as above.    Electronically signed by resident: Shaun Carlos  Date:    09/24/2022  Time:    17:40    Electronically signed by: Austyn Aguirre MD  Date:    09/24/2022  Time:    18:20             Narrative:    EXAMINATION:  CTA HEAD AND NECK (XPD)    CLINICAL HISTORY:  Vertigo, central;    TECHNIQUE:  Axial CT images obtained throughout the region of the head before and after the administration of intravenous contrast.  CT angiogram was performed from through the cervical and intracranial vasculature during the IV bolus administration of 75mL of Omnipaque 350.  Multiplanar MPR and MIP reformats were performed.    CT source data was analyzed using artificial intelligence software for detection of a large vessel occlusion (LVO) or hemorrhage in order to enable computer assisted triage notification and aid clinical stroke decision making.    COMPARISON:  None    FINDINGS:  The ventricles are normal in size without evidence of hydrocephalus.    Age-related mild generalized cerebral  volume loss.  No parenchymal mass, hemorrhage, edema or major vascular distribution infarct.    No extra-axial blood or fluid collections.    No acute displaced calvarial fracture.  Significant mucosal thickening in the right maxillary antrum.  Mastoid air cells clear.      CTA:    The aortic arch maintains a normal branching pattern.    The common and internal carotid arteries are normal in course and caliber. No significant stenosis in either carotid bifurcation per NASCET criteria.  Mild calcified plaque in the intracranial left internal carotid artery.    The vertebral origins are patent. The cervical vertebral arteries are normal in course and caliber. Vertebrobasilar system is within normal limits without focal abnormality.    The anterior, middle, and posterior cerebral arteries are within normal limits, without evidence of significant stenosis, focal occlusion, or intracranial aneurysm formation.    Limited evaluation of the dural venous sinuses without evidence of thrombosis.    Additional findings: Centrilobular emphysema with biapical bullous disease.  1.7 cm pleural based irregular opacity in the right upper lobe (axial series 5, image 103), possible subsegmental atelectasis or fibrotic change.  Thyroid, parotid, and submandibular glands unremarkable.  Several scattered subcentimeter cervical and imaged upper mediastinal lymph nodes.                                      Follow Up Instructions:     No future appointments.    Discharge Instructions:  No discharge procedures on file.    Luis Soto MD  Attending Staff Physician  [facility]

## 2022-09-29 VITALS
BODY MASS INDEX: 21.96 KG/M2 | RESPIRATION RATE: 16 BRPM | DIASTOLIC BLOOD PRESSURE: 64 MMHG | HEIGHT: 65 IN | HEART RATE: 91 BPM | TEMPERATURE: 98 F | WEIGHT: 131.81 LBS | SYSTOLIC BLOOD PRESSURE: 100 MMHG | OXYGEN SATURATION: 92 %

## 2022-09-29 LAB
ALBUMIN SERPL BCP-MCNC: 3 G/DL (ref 3.5–5.2)
ALP SERPL-CCNC: 73 U/L (ref 55–135)
ALT SERPL W/O P-5'-P-CCNC: 31 U/L (ref 10–44)
ANION GAP SERPL CALC-SCNC: 10 MMOL/L (ref 8–16)
ANISOCYTOSIS BLD QL SMEAR: SLIGHT
AST SERPL-CCNC: 48 U/L (ref 10–40)
BACTERIA BLD CULT: NORMAL
BACTERIA BLD CULT: NORMAL
BASOPHILS # BLD AUTO: 0.01 K/UL (ref 0–0.2)
BASOPHILS NFR BLD: 0.4 % (ref 0–1.9)
BILIRUB SERPL-MCNC: 0.3 MG/DL (ref 0.1–1)
BUN SERPL-MCNC: 13 MG/DL (ref 8–23)
CALCIUM SERPL-MCNC: 8.8 MG/DL (ref 8.7–10.5)
CHLORIDE SERPL-SCNC: 100 MMOL/L (ref 95–110)
CO2 SERPL-SCNC: 24 MMOL/L (ref 23–29)
CREAT SERPL-MCNC: 1.2 MG/DL (ref 0.5–1.4)
DIFFERENTIAL METHOD: ABNORMAL
EOSINOPHIL # BLD AUTO: 0 K/UL (ref 0–0.5)
EOSINOPHIL NFR BLD: 0 % (ref 0–8)
ERYTHROCYTE [DISTWIDTH] IN BLOOD BY AUTOMATED COUNT: 13.2 % (ref 11.5–14.5)
EST. GFR  (NO RACE VARIABLE): >60 ML/MIN/1.73 M^2
GLUCOSE SERPL-MCNC: 96 MG/DL (ref 70–110)
HCT VFR BLD AUTO: 40.6 % (ref 40–54)
HGB BLD-MCNC: 13.4 G/DL (ref 14–18)
HYPOCHROMIA BLD QL SMEAR: ABNORMAL
IMM GRANULOCYTES # BLD AUTO: 0.01 K/UL (ref 0–0.04)
IMM GRANULOCYTES NFR BLD AUTO: 0.4 % (ref 0–0.5)
LYMPHOCYTES # BLD AUTO: 1 K/UL (ref 1–4.8)
LYMPHOCYTES NFR BLD: 34.9 % (ref 18–48)
MAGNESIUM SERPL-MCNC: 2 MG/DL (ref 1.6–2.6)
MCH RBC QN AUTO: 31 PG (ref 27–31)
MCHC RBC AUTO-ENTMCNC: 33 G/DL (ref 32–36)
MCV RBC AUTO: 94 FL (ref 82–98)
MONOCYTES # BLD AUTO: 0.2 K/UL (ref 0.3–1)
MONOCYTES NFR BLD: 6.5 % (ref 4–15)
NEUTROPHILS # BLD AUTO: 1.6 K/UL (ref 1.8–7.7)
NEUTROPHILS NFR BLD: 57.8 % (ref 38–73)
NRBC BLD-RTO: 0 /100 WBC
PLATELET # BLD AUTO: 213 K/UL (ref 150–450)
PLATELET BLD QL SMEAR: ABNORMAL
PMV BLD AUTO: 10.1 FL (ref 9.2–12.9)
POTASSIUM SERPL-SCNC: 4.5 MMOL/L (ref 3.5–5.1)
PROT SERPL-MCNC: 6.7 G/DL (ref 6–8.4)
RBC # BLD AUTO: 4.32 M/UL (ref 4.6–6.2)
SODIUM SERPL-SCNC: 134 MMOL/L (ref 136–145)
WBC # BLD AUTO: 2.78 K/UL (ref 3.9–12.7)

## 2022-09-29 PROCEDURE — 25000003 PHARM REV CODE 250: Performed by: HOSPITALIST

## 2022-09-29 PROCEDURE — 83735 ASSAY OF MAGNESIUM: CPT | Performed by: HOSPITALIST

## 2022-09-29 PROCEDURE — 25000003 PHARM REV CODE 250: Performed by: FAMILY MEDICINE

## 2022-09-29 PROCEDURE — 85025 COMPLETE CBC W/AUTO DIFF WBC: CPT | Performed by: HOSPITALIST

## 2022-09-29 PROCEDURE — 99239 HOSP IP/OBS DSCHRG MGMT >30: CPT | Mod: ,,, | Performed by: HOSPITALIST

## 2022-09-29 PROCEDURE — 94640 AIRWAY INHALATION TREATMENT: CPT

## 2022-09-29 PROCEDURE — 99239 PR HOSPITAL DISCHARGE DAY,>30 MIN: ICD-10-PCS | Mod: ,,, | Performed by: HOSPITALIST

## 2022-09-29 PROCEDURE — 36415 COLL VENOUS BLD VENIPUNCTURE: CPT | Performed by: HOSPITALIST

## 2022-09-29 PROCEDURE — 94761 N-INVAS EAR/PLS OXIMETRY MLT: CPT

## 2022-09-29 PROCEDURE — 80053 COMPREHEN METABOLIC PANEL: CPT | Performed by: HOSPITALIST

## 2022-09-29 RX ORDER — CIPROFLOXACIN 500 MG/1
500 TABLET ORAL EVERY 12 HOURS
Qty: 16 TABLET | Refills: 0 | Status: SHIPPED | OUTPATIENT
Start: 2022-09-29 | End: 2022-10-07

## 2022-09-29 RX ADMIN — PANTOPRAZOLE SODIUM 40 MG: 40 TABLET, DELAYED RELEASE ORAL at 08:09

## 2022-09-29 RX ADMIN — FLUTICASONE FUROATE AND VILANTEROL TRIFENATATE 1 PUFF: 100; 25 POWDER RESPIRATORY (INHALATION) at 09:09

## 2022-09-29 RX ADMIN — CIPROFLOXACIN 500 MG: 500 TABLET, FILM COATED ORAL at 08:09

## 2022-09-29 RX ADMIN — TAMSULOSIN HYDROCHLORIDE 0.4 MG: 0.4 CAPSULE ORAL at 08:09

## 2022-09-29 NOTE — PHYSICIAN QUERY
PT Name: Darshan Hoskins  MR #: [id number]  DOCUMENTATION CLARIFICATION   CDS: Miri Ndiaye RN      Contact information:[email or web address]   This form is a permanent document in the medical record.     Query Date: September 29, 2022    By submitting this query, we are merely seeking further clarification of documentation. Please utilize your independent clinical judgment when addressing the question(s) below.    The Medical Record contains the following:   Indicators Supporting Clinical Findings Location in Medical Record   x CKD or Chronic Kidney (Renal) Failure/Disease CKD (chronic kidney disease)  ? with AMBROCIO   9/25-9/28  PNs and 9/28 DC Summary     x BUN/Creatinine                          GFR  09/24/22 16:45 09/25/22 03:37 09/26/22 04:39 09/27/22 04:53 09/28/22 08:57   BUN 17 18 13 10 12   Creatinine 1.6 (H) 1.7 (H) 1.4 1.3 1.2   eGFR 48.1  44.7  56.5  >60.0 >60.0      Lab Values    Dehydration      Nausea / Vomiting      Dialysis / CRRT     x Medication LR 1.878L bolus IV x 1  (9/24)    Renally dose meds MAR    9/25-9/28 HM PNs and 9/28 DC Summary      Treatment     x Other Chronic Conditions PMHx: BPH, COPD, anemia, and GERD   9/24  H&P   x Other AMBROCIO resolved   9/26  PN       Provider, please further specify the stage of Chronic Kidney Disease (CKD):    [   ] Chronic Kidney Disease (CKD) stage 1 - Normal or high eGFR 90+     [   ] Chronic Kidney Disease (CKD) stage 2 - Mildly decreased eGFR 60-89     [  x ] Chronic Kidney Disease (CKD) Ruled out, treating AMBROCIO only     [   ] Other (please specify): _________________     [   ] Clinically Undetermined     Please document in your progress notes daily for the duration of treatment until resolved and include in your discharge summary.    Reference:     AIDE Leary MD, & LINN Gamez MD, MS. (2020, June 18). Definition and staging of chronic kidney disease in adults ([id number] THANH Craven MD, ScD & [id number] ABBEY Mota MD, MSc,  Eds.). Retrieved October 21, 2020, from https://www.[employer]/contents/definition-and-staging-of-chronic-kidney-disease-in-adults?search=ckd%20staging&source=search_result&selectedTitle=1~150&usage_type=default&display_rank=1    Form No. 72166

## 2022-09-29 NOTE — PHYSICIAN QUERY
PT Name: Darshan Hoskins  MR #: [id number]     DOCUMENTATION CLARIFICATION      CDS: Miri Ndiaye RN      Contact information:[email or web address]   This form is a permanent document in the medical record.    Query Date: September 29, 2022    By submitting this query, we are merely seeking further clarification of documentation to reflect the severity of illness of your patient. Please utilize your independent clinical judgment when addressing the question(s) below.     The Medical Record contains the following:   Indicators   Supporting Clinical Findings Location in Medical Record   x Documentation of Sepsis, Septic Shock Sepsis  - SIRS 3/4 with UTI source 9/24 HM H&P, 9/25-9/28 HM PNs, 9/28 DC Summary      Blood Culture      Respiratory Culture     x Urine Culture 9/24 Urine Culture  Final Result:  ESCHERICHIA COLI  10,000 - 49,999 cfu/ml  Susceptibility   Escherichia coli    CULTURE, URINE    Ceftriaxone <=1 mcg/mL Sensitive    Ciprofloxacin <=1 mcg/mL Sensitive    Piperacillin/Tazo <=16 mcg/mL Sensitive      Lab Values    Other Culture     x Acute/Chronic Illness AMBROCIO with CKD  Leucopenia  COPD  BPH  UTI   9/28 HM PN   x Medication/Treatment LR 1.878L bolus IV x 1  (9/24)  Zosyn 4.5g IV x 2 doses  (9/24-9/25)  Vancomycin 1g IV x 1  (9/24)  Vancomycin 750mg IV x 1  (9/25)  Ceftriaxone 2g IV q 24hr  (9/25-9/26)  Ciprofloxacin 500mg PO q 12hr  (9/27-current)   MAR    Other          Provider, please further specify the Sepsis diagnosis:     [ x  ] Due to E. Coli UTI     [   ] Other (please specify): ____________________     [   ] Clinically Undetermined         Please document in your progress notes daily for the duration of treatment until resolved, and include in your discharge summary.  Form No. 31716

## 2022-09-29 NOTE — PLAN OF CARE
[facility] - Med Surg  Discharge Final Note    Primary Care Provider: [facility]    Expected Discharge Date: 9/29/2022    Final Discharge Note (most recent)       Final Note - 09/29/22 1241          Final Note    Assessment Type Final Discharge Note     Anticipated Discharge Disposition Home or Self Care     Hospital Resources/Appts/Education Provided Provided patient/caregiver with written discharge plan information;Appointments scheduled and added to AVS        Post-Acute Status    Discharge Delays None known at this time                     Important Message from Medicare             Contact Info       [facility]   Relationship: PCP - General    [address]  [facility] [address]   Phone: [phone]       Next Steps: Go on 10/6/2022    Instructions: 12:45pm on 10/6 is your follow-up hospital appointment. thanks          Pt went home with no needs.  Follow-up appointment was made.  Drove himself home.     DCP: home    Kailee Ospina RN [facility] [phone]

## 2022-09-30 ENCOUNTER — PATIENT OUTREACH (OUTPATIENT)
Dept: ADMINISTRATIVE | Facility: CLINIC | Age: 62
End: 2022-09-30
Payer: MEDICAID

## 2022-09-30 NOTE — PROGRESS NOTES
C3 nurse attempted to contact Darshan Hoskins for a TCC post hospital discharge follow up call. No answer. Left voicemail with callback information. The patient has a scheduled HOSFU appointment with Prashant on 10/6/22 @ [phone].

## 2022-10-01 LAB
BACTERIA BLD CULT: NORMAL
BACTERIA BLD CULT: NORMAL

## 2022-10-03 NOTE — PROGRESS NOTES
C3 nurse 2nd attempt to contact Darshan Hoskins for a TCC post hospital discharge follow up call. No answer. Left voicemail with callback information. The patient has a scheduled HOSFU appointment with Prashant on 10/6/22 @ [phone].

## 2022-12-26 PROBLEM — N39.0 UTI (URINARY TRACT INFECTION): Status: RESOLVED | Noted: 2022-09-24 | Resolved: 2022-12-26

## 2022-12-26 PROBLEM — A41.9 SEPSIS: Status: RESOLVED | Noted: 2022-09-24 | Resolved: 2022-12-26

## 2024-08-18 ENCOUNTER — HOSPITAL ENCOUNTER (INPATIENT)
Facility: HOSPITAL | Age: 64
LOS: 4 days | Discharge: LEFT BEFORE DISCHARGE | DRG: 872 | End: 2024-08-22
Attending: EMERGENCY MEDICINE | Admitting: STUDENT IN AN ORGANIZED HEALTH CARE EDUCATION/TRAINING PROGRAM
Payer: MEDICARE

## 2024-08-18 DIAGNOSIS — R00.0 TACHYCARDIA: Primary | ICD-10-CM

## 2024-08-18 DIAGNOSIS — B96.20 E COLI BACTEREMIA: ICD-10-CM

## 2024-08-18 DIAGNOSIS — A41.9 SEPSIS DUE TO URINARY TRACT INFECTION: ICD-10-CM

## 2024-08-18 DIAGNOSIS — E88.09 HYPOALBUMINEMIA: ICD-10-CM

## 2024-08-18 DIAGNOSIS — J44.9 CHRONIC OBSTRUCTIVE PULMONARY DISEASE, UNSPECIFIED COPD TYPE: ICD-10-CM

## 2024-08-18 DIAGNOSIS — N17.9 ACUTE KIDNEY INJURY: ICD-10-CM

## 2024-08-18 DIAGNOSIS — R07.9 CHEST PAIN: ICD-10-CM

## 2024-08-18 DIAGNOSIS — N39.0 SEPSIS DUE TO URINARY TRACT INFECTION: ICD-10-CM

## 2024-08-18 DIAGNOSIS — R78.81 E COLI BACTEREMIA: ICD-10-CM

## 2024-08-18 DIAGNOSIS — R53.1 WEAKNESS: ICD-10-CM

## 2024-08-18 PROBLEM — N18.9 ACUTE RENAL FAILURE SUPERIMPOSED ON CHRONIC KIDNEY DISEASE: Status: ACTIVE | Noted: 2024-08-18

## 2024-08-18 LAB
ACINETOBACTER CALCOACETICUS/BAUMANNII COMPLEX: NOT DETECTED
ALBUMIN SERPL BCP-MCNC: 3.2 G/DL (ref 3.5–5.2)
ALLENS TEST: NORMAL
ALP SERPL-CCNC: 99 U/L (ref 55–135)
ALT SERPL W/O P-5'-P-CCNC: 13 U/L (ref 10–44)
ANION GAP SERPL CALC-SCNC: 13 MMOL/L (ref 8–16)
AST SERPL-CCNC: 20 U/L (ref 10–40)
BACTERIA #/AREA URNS AUTO: ABNORMAL /HPF
BACTEROIDES FRAGILIS: NOT DETECTED
BASOPHILS # BLD AUTO: 0.06 K/UL (ref 0–0.2)
BASOPHILS NFR BLD: 0.3 % (ref 0–1.9)
BILIRUB SERPL-MCNC: 0.9 MG/DL (ref 0.1–1)
BILIRUB UR QL STRIP: NEGATIVE
BNP SERPL-MCNC: 102 PG/ML (ref 0–99)
BNP SERPL-MCNC: 102 PG/ML (ref 0–99)
BUN SERPL-MCNC: 19 MG/DL (ref 8–23)
CALCIUM SERPL-MCNC: 9.7 MG/DL (ref 8.7–10.5)
CANDIDA ALBICANS: NOT DETECTED
CANDIDA AURIS: NOT DETECTED
CANDIDA GLABRATA: NOT DETECTED
CANDIDA KRUSEI: NOT DETECTED
CANDIDA PARAPSILOSIS: NOT DETECTED
CANDIDA TROPICALIS: NOT DETECTED
CHLORIDE SERPL-SCNC: 101 MMOL/L (ref 95–110)
CLARITY UR REFRACT.AUTO: ABNORMAL
CO2 SERPL-SCNC: 23 MMOL/L (ref 23–29)
COLOR UR AUTO: ABNORMAL
CREAT SERPL-MCNC: 1.8 MG/DL (ref 0.5–1.4)
CRYPTOCOCCUS NEOFORMANS/GATTII: NOT DETECTED
CTX-M GENE (ESBL PRODUCER): NOT DETECTED
DIFFERENTIAL METHOD BLD: ABNORMAL
ENTEROBACTER CLOACAE COMPLEX: NOT DETECTED
ENTEROBACTERALES: ABNORMAL
ENTEROCOCCUS FAECALIS: NOT DETECTED
ENTEROCOCCUS FAECIUM: NOT DETECTED
EOSINOPHIL # BLD AUTO: 0 K/UL (ref 0–0.5)
EOSINOPHIL NFR BLD: 0 % (ref 0–8)
ERYTHROCYTE [DISTWIDTH] IN BLOOD BY AUTOMATED COUNT: 13.9 % (ref 11.5–14.5)
ESCHERICHIA COLI: DETECTED
EST. GFR  (NO RACE VARIABLE): 41.3 ML/MIN/1.73 M^2
GLUCOSE SERPL-MCNC: 136 MG/DL (ref 70–110)
GLUCOSE UR QL STRIP: NEGATIVE
HAEMOPHILUS INFLUENZAE: NOT DETECTED
HCT VFR BLD AUTO: 42.4 % (ref 40–54)
HCV AB SERPL QL IA: NORMAL
HGB BLD-MCNC: 14.3 G/DL (ref 14–18)
HGB UR QL STRIP: ABNORMAL
HIV 1+2 AB+HIV1 P24 AG SERPL QL IA: NORMAL
HYALINE CASTS UR QL AUTO: 0 /LPF
IMM GRANULOCYTES # BLD AUTO: 0.18 K/UL (ref 0–0.04)
IMM GRANULOCYTES NFR BLD AUTO: 0.8 % (ref 0–0.5)
IMP GENE (CARBAPENEM RESISTANT): NOT DETECTED
KETONES UR QL STRIP: NEGATIVE
KLEBSIELLA AEROGENES: NOT DETECTED
KLEBSIELLA OXYTOCA: NOT DETECTED
KLEBSIELLA PNEUMONIAE GROUP: NOT DETECTED
KPC RESISTANCE GENE (CARBAPENEM): NOT DETECTED
LACTATE SERPL-SCNC: 1.5 MMOL/L (ref 0.5–2.2)
LDH SERPL L TO P-CCNC: 1.58 MMOL/L (ref 0.5–2.2)
LEUKOCYTE ESTERASE UR QL STRIP: ABNORMAL
LIPASE SERPL-CCNC: 11 U/L (ref 4–60)
LISTERIA MONOCYTOGENES: NOT DETECTED
LYMPHOCYTES # BLD AUTO: 1.2 K/UL (ref 1–4.8)
LYMPHOCYTES NFR BLD: 5.1 % (ref 18–48)
MCH RBC QN AUTO: 32.2 PG (ref 27–31)
MCHC RBC AUTO-ENTMCNC: 33.7 G/DL (ref 32–36)
MCR-1: NOT DETECTED
MCV RBC AUTO: 96 FL (ref 82–98)
MEC A/C AND MREJ (MRSA): ABNORMAL
MEC A/C: ABNORMAL
MICROSCOPIC COMMENT: ABNORMAL
MONOCYTES # BLD AUTO: 1.6 K/UL (ref 0.3–1)
MONOCYTES NFR BLD: 6.9 % (ref 4–15)
NDM GENE (CARBAPENEM RESISTANT): NOT DETECTED
NEISSERIA MENINGITIDIS: NOT DETECTED
NEUTROPHILS # BLD AUTO: 19.8 K/UL (ref 1.8–7.7)
NEUTROPHILS NFR BLD: 86.9 % (ref 38–73)
NITRITE UR QL STRIP: POSITIVE
NRBC BLD-RTO: 0 /100 WBC
OHS QRS DURATION: 60 MS
OHS QRS DURATION: 62 MS
OHS QTC CALCULATION: 427 MS
OHS QTC CALCULATION: 435 MS
OXA-48-LIKE (CARBAPENEM RESISTANT): NOT DETECTED
PH UR STRIP: 6 [PH] (ref 5–8)
PLATELET # BLD AUTO: 285 K/UL (ref 150–450)
PMV BLD AUTO: 9.3 FL (ref 9.2–12.9)
POTASSIUM SERPL-SCNC: 3.7 MMOL/L (ref 3.5–5.1)
PROT SERPL-MCNC: 7.3 G/DL (ref 6–8.4)
PROT UR QL STRIP: ABNORMAL
PROTEUS SPECIES: NOT DETECTED
PSEUDOMONAS AERUGINOSA: NOT DETECTED
RBC # BLD AUTO: 4.44 M/UL (ref 4.6–6.2)
RBC #/AREA URNS AUTO: >100 /HPF (ref 0–4)
SALMONELLA SP: NOT DETECTED
SAMPLE: NORMAL
SARS-COV-2 RDRP RESP QL NAA+PROBE: NEGATIVE
SERRATIA MARCESCENS: NOT DETECTED
SITE: NORMAL
SODIUM SERPL-SCNC: 137 MMOL/L (ref 136–145)
SP GR UR STRIP: 1.01 (ref 1–1.03)
STAPHYLOCOCCUS AUREUS: NOT DETECTED
STAPHYLOCOCCUS EPIDERMIDIS: NOT DETECTED
STAPHYLOCOCCUS LUGDUNESIS: NOT DETECTED
STAPHYLOCOCCUS SPECIES: NOT DETECTED
STENOTROPHOMONAS MALTOPHILIA: NOT DETECTED
STREPTOCOCCUS AGALACTIAE: NOT DETECTED
STREPTOCOCCUS PNEUMONIAE: NOT DETECTED
STREPTOCOCCUS PYOGENES: NOT DETECTED
STREPTOCOCCUS SPECIES: NOT DETECTED
TROPONIN I SERPL DL<=0.01 NG/ML-MCNC: 0.01 NG/ML (ref 0–0.03)
TROPONIN I SERPL DL<=0.01 NG/ML-MCNC: 0.02 NG/ML (ref 0–0.03)
TROPONIN I SERPL DL<=0.01 NG/ML-MCNC: 0.02 NG/ML (ref 0–0.03)
URN SPEC COLLECT METH UR: ABNORMAL
VAN A/B (VRE GENE): ABNORMAL
VIM GENE (CARBAPENEM RESISTANT): NOT DETECTED
WBC # BLD AUTO: 22.76 K/UL (ref 3.9–12.7)
WBC #/AREA URNS AUTO: >100 /HPF (ref 0–5)
WBC CLUMPS UR QL AUTO: ABNORMAL

## 2024-08-18 PROCEDURE — 93005 ELECTROCARDIOGRAM TRACING: CPT

## 2024-08-18 PROCEDURE — 12000002 HC ACUTE/MED SURGE SEMI-PRIVATE ROOM

## 2024-08-18 PROCEDURE — 96360 HYDRATION IV INFUSION INIT: CPT

## 2024-08-18 PROCEDURE — 96361 HYDRATE IV INFUSION ADD-ON: CPT

## 2024-08-18 PROCEDURE — 93010 ELECTROCARDIOGRAM REPORT: CPT | Mod: ,,, | Performed by: INTERNAL MEDICINE

## 2024-08-18 PROCEDURE — 84484 ASSAY OF TROPONIN QUANT: CPT | Mod: 91 | Performed by: PHYSICIAN ASSISTANT

## 2024-08-18 PROCEDURE — 25000003 PHARM REV CODE 250

## 2024-08-18 PROCEDURE — 87154 CUL TYP ID BLD PTHGN 6+ TRGT: CPT

## 2024-08-18 PROCEDURE — 86803 HEPATITIS C AB TEST: CPT | Performed by: PHYSICIAN ASSISTANT

## 2024-08-18 PROCEDURE — 87389 HIV-1 AG W/HIV-1&-2 AB AG IA: CPT | Performed by: PHYSICIAN ASSISTANT

## 2024-08-18 PROCEDURE — 83880 ASSAY OF NATRIURETIC PEPTIDE: CPT

## 2024-08-18 PROCEDURE — 80053 COMPREHEN METABOLIC PANEL: CPT

## 2024-08-18 PROCEDURE — 63600175 PHARM REV CODE 636 W HCPCS: Performed by: PHYSICIAN ASSISTANT

## 2024-08-18 PROCEDURE — U0002 COVID-19 LAB TEST NON-CDC: HCPCS

## 2024-08-18 PROCEDURE — 87077 CULTURE AEROBIC IDENTIFY: CPT | Mod: 59

## 2024-08-18 PROCEDURE — 83605 ASSAY OF LACTIC ACID: CPT | Performed by: PHYSICIAN ASSISTANT

## 2024-08-18 PROCEDURE — 81001 URINALYSIS AUTO W/SCOPE: CPT

## 2024-08-18 PROCEDURE — 87186 SC STD MICRODIL/AGAR DIL: CPT

## 2024-08-18 PROCEDURE — 99900035 HC TECH TIME PER 15 MIN (STAT)

## 2024-08-18 PROCEDURE — 25000242 PHARM REV CODE 250 ALT 637 W/ HCPCS: Performed by: PHYSICIAN ASSISTANT

## 2024-08-18 PROCEDURE — 85025 COMPLETE CBC W/AUTO DIFF WBC: CPT

## 2024-08-18 PROCEDURE — 87086 URINE CULTURE/COLONY COUNT: CPT

## 2024-08-18 PROCEDURE — 99285 EMERGENCY DEPT VISIT HI MDM: CPT | Mod: 25

## 2024-08-18 PROCEDURE — 25000003 PHARM REV CODE 250: Performed by: PHYSICIAN ASSISTANT

## 2024-08-18 PROCEDURE — 94761 N-INVAS EAR/PLS OXIMETRY MLT: CPT

## 2024-08-18 PROCEDURE — 63600175 PHARM REV CODE 636 W HCPCS

## 2024-08-18 PROCEDURE — 87088 URINE BACTERIA CULTURE: CPT

## 2024-08-18 PROCEDURE — 83605 ASSAY OF LACTIC ACID: CPT

## 2024-08-18 PROCEDURE — 80047 BASIC METABLC PNL IONIZED CA: CPT

## 2024-08-18 PROCEDURE — 83690 ASSAY OF LIPASE: CPT

## 2024-08-18 PROCEDURE — 87040 BLOOD CULTURE FOR BACTERIA: CPT

## 2024-08-18 PROCEDURE — 84484 ASSAY OF TROPONIN QUANT: CPT

## 2024-08-18 RX ORDER — IBUPROFEN 200 MG
24 TABLET ORAL
Status: DISCONTINUED | OUTPATIENT
Start: 2024-08-18 | End: 2024-08-22 | Stop reason: HOSPADM

## 2024-08-18 RX ORDER — IPRATROPIUM BROMIDE AND ALBUTEROL SULFATE 2.5; .5 MG/3ML; MG/3ML
3 SOLUTION RESPIRATORY (INHALATION) EVERY 4 HOURS PRN
Status: DISCONTINUED | OUTPATIENT
Start: 2024-08-18 | End: 2024-08-22 | Stop reason: HOSPADM

## 2024-08-18 RX ORDER — NALOXONE HCL 0.4 MG/ML
0.02 VIAL (ML) INJECTION
Status: DISCONTINUED | OUTPATIENT
Start: 2024-08-18 | End: 2024-08-22 | Stop reason: HOSPADM

## 2024-08-18 RX ORDER — TALC
6 POWDER (GRAM) TOPICAL NIGHTLY PRN
Status: DISCONTINUED | OUTPATIENT
Start: 2024-08-18 | End: 2024-08-22 | Stop reason: HOSPADM

## 2024-08-18 RX ORDER — PANTOPRAZOLE SODIUM 40 MG/1
40 TABLET, DELAYED RELEASE ORAL DAILY
Status: DISCONTINUED | OUTPATIENT
Start: 2024-08-18 | End: 2024-08-22 | Stop reason: HOSPADM

## 2024-08-18 RX ORDER — PROCHLORPERAZINE EDISYLATE 5 MG/ML
5 INJECTION INTRAMUSCULAR; INTRAVENOUS EVERY 6 HOURS PRN
Status: DISCONTINUED | OUTPATIENT
Start: 2024-08-18 | End: 2024-08-22 | Stop reason: HOSPADM

## 2024-08-18 RX ORDER — ACETAMINOPHEN 325 MG/1
650 TABLET ORAL EVERY 4 HOURS PRN
Status: DISCONTINUED | OUTPATIENT
Start: 2024-08-18 | End: 2024-08-22 | Stop reason: HOSPADM

## 2024-08-18 RX ORDER — ONDANSETRON HYDROCHLORIDE 2 MG/ML
4 INJECTION, SOLUTION INTRAVENOUS EVERY 8 HOURS PRN
Status: DISCONTINUED | OUTPATIENT
Start: 2024-08-18 | End: 2024-08-22 | Stop reason: HOSPADM

## 2024-08-18 RX ORDER — POLYETHYLENE GLYCOL 3350 17 G/17G
17 POWDER, FOR SOLUTION ORAL DAILY PRN
Status: DISCONTINUED | OUTPATIENT
Start: 2024-08-18 | End: 2024-08-22 | Stop reason: HOSPADM

## 2024-08-18 RX ORDER — GLUCAGON 1 MG
1 KIT INJECTION
Status: DISCONTINUED | OUTPATIENT
Start: 2024-08-18 | End: 2024-08-22 | Stop reason: HOSPADM

## 2024-08-18 RX ORDER — SODIUM CHLORIDE 0.9 % (FLUSH) 0.9 %
10 SYRINGE (ML) INJECTION EVERY 8 HOURS PRN
Status: DISCONTINUED | OUTPATIENT
Start: 2024-08-18 | End: 2024-08-22 | Stop reason: HOSPADM

## 2024-08-18 RX ORDER — BISACODYL 10 MG/1
10 SUPPOSITORY RECTAL DAILY PRN
Status: DISCONTINUED | OUTPATIENT
Start: 2024-08-18 | End: 2024-08-22 | Stop reason: HOSPADM

## 2024-08-18 RX ORDER — NAPROXEN 500 MG/1
500 TABLET ORAL 2 TIMES DAILY PRN
COMMUNITY

## 2024-08-18 RX ORDER — FLUTICASONE FUROATE AND VILANTEROL 100; 25 UG/1; UG/1
1 POWDER RESPIRATORY (INHALATION) DAILY
Status: DISCONTINUED | OUTPATIENT
Start: 2024-08-18 | End: 2024-08-22 | Stop reason: HOSPADM

## 2024-08-18 RX ORDER — TAMSULOSIN HYDROCHLORIDE 0.4 MG/1
0.4 CAPSULE ORAL DAILY
Status: DISCONTINUED | OUTPATIENT
Start: 2024-08-18 | End: 2024-08-22 | Stop reason: HOSPADM

## 2024-08-18 RX ORDER — IBUPROFEN 200 MG
16 TABLET ORAL
Status: DISCONTINUED | OUTPATIENT
Start: 2024-08-18 | End: 2024-08-22 | Stop reason: HOSPADM

## 2024-08-18 RX ORDER — PANTOPRAZOLE SODIUM 40 MG/1
40 TABLET, DELAYED RELEASE ORAL DAILY
COMMUNITY

## 2024-08-18 RX ADMIN — PANTOPRAZOLE SODIUM 40 MG: 40 TABLET, DELAYED RELEASE ORAL at 11:08

## 2024-08-18 RX ADMIN — TAMSULOSIN HYDROCHLORIDE 0.4 MG: 0.4 CAPSULE ORAL at 11:08

## 2024-08-18 RX ADMIN — SODIUM CHLORIDE, SODIUM LACTATE, POTASSIUM CHLORIDE, AND CALCIUM CHLORIDE 1000 ML: .6; .31; .03; .02 INJECTION, SOLUTION INTRAVENOUS at 12:08

## 2024-08-18 RX ADMIN — CEFTRIAXONE 1 G: 1 INJECTION, POWDER, FOR SOLUTION INTRAMUSCULAR; INTRAVENOUS at 12:08

## 2024-08-18 RX ADMIN — ACETAMINOPHEN 650 MG: 325 TABLET ORAL at 10:08

## 2024-08-18 RX ADMIN — SODIUM CHLORIDE, POTASSIUM CHLORIDE, SODIUM LACTATE AND CALCIUM CHLORIDE 500 ML: 600; 310; 30; 20 INJECTION, SOLUTION INTRAVENOUS at 09:08

## 2024-08-18 RX ADMIN — FLUTICASONE FUROATE AND VILANTEROL TRIFENATATE 1 PUFF: 100; 25 POWDER RESPIRATORY (INHALATION) at 11:08

## 2024-08-18 RX ADMIN — SODIUM CHLORIDE 500 ML: 9 INJECTION, SOLUTION INTRAVENOUS at 08:08

## 2024-08-18 NOTE — ED NOTES
Assumed care of the patient. Report received from BETHANIE Villarreal. Pt on continuous cardiac monitoring, continuous pulse oximetry, and automatic BP cuff cycling Q30min. Pt in hospital gown, side rails up X2, bed low and locked, and call light is placed within reach. No family/visitors at bedside at this time. Pt denies any complaints or needs.

## 2024-08-18 NOTE — ASSESSMENT & PLAN NOTE
This patient does have evidence of infective focus  My overall impression is sepsis.  Source: Urinary Tract  Antibiotics given-   Antibiotics (72h ago, onward)      Start     Stop Route Frequency Ordered    08/19/24 0000  cefTRIAXone (Rocephin) 1 g in D5W 100 mL IVPB (MB+)         -- IV Every 24 hours (non-standard times) 08/18/24 1116    08/18/24 1045  cefTRIAXone (Rocephin) 1 g in D5W 100 mL IVPB (MB+)         08/18/24 2244 IV ED 1 Time 08/18/24 1033          Latest lactate reviewed-  Recent Labs   Lab 08/18/24  0825   POCLAC 1.58     Organ dysfunction indicated by Acute kidney injury    Fluid challenge Actual Body weight- Patient will receive 30ml/kg actual body weight to calculate fluid bolus for treatment of septic shock.     Will Not start Pressors- Levophed for MAP of 65  Source control achieved by:   - UA with 3+ leuks, 3+ blood, >100 WBC, >100 RBCs  - given 1L IVF in the ED, 1L of LR ordered per sepsis protocol  - CTX 1g q24h

## 2024-08-18 NOTE — ED TRIAGE NOTES
Patient arrived via personal transport for the chief complaint of dizziness and N/V for the past two days. The patient stated he is feeling feverish as well. Patient now endorsing chest pain and shortness of breath.

## 2024-08-18 NOTE — HPI
Darshan Hoskins is a 63 y.o. male with PMHx significant for BPH and CKD admitted to hospital medicine for sepsis 2/2 UTI. Patient reports dizziness/fatigue, emesis and subjective fever for the past 2 days. Also endorses noting cloudy, red urine today, which prompted his visit to the ED. Denies CP, SOB, abdominal pain, back pain, changes in BMs, urinary symptoms. Of note, patient was admitted to [facility] in Sept of 2022 for sepsis 2/2 pan sensitive E. Coli UTI. States his symptoms today feel similar to previous UTI admission.    Upon arrival to the ED, patient was tachy to 133 with /63, temp 99.9, and SpO2 94%% on RA. Labs remarkable for WBC 22.76, Cr 1.8 (baseline 1.2), , troponin 0.019. UA with 1+ protein, 3+ blood, + nitrites, 3+ leuks, >100 RBCs, >100 WBCs. EKG with sinus tach to 113. CXR with no acute process. Given 1L IVF and CTX x1.

## 2024-08-18 NOTE — SUBJECTIVE & OBJECTIVE
"Past Medical History:   Diagnosis Date    Anemia     Anxiety     Arthritis     Back pain     Carpal tunnel syndrome     GERD (gastroesophageal reflux disease)     Stomach ulcer     "bleeding"       History reviewed. No pertinent surgical history.    Review of patient's allergies indicates:  No Known Allergies    No current facility-administered medications on file prior to encounter.     Current Outpatient Medications on File Prior to Encounter   Medication Sig    albuterol sulfate (VENTOLIN HFA INHL) Inhale into the lungs.    benzonatate (TESSALON) 200 MG capsule Take 1 capsule (200 mg total) by mouth 3 (three) times daily as needed for Cough.    budesonide-formoterol 160-4.5 mcg (SYMBICORT) 160-4.5 mcg/actuation HFAA Inhale 2 puffs into the lungs every 12 (twelve) hours. Controller    esomeprazole (NEXIUM) 20 MG capsule Take 20 mg by mouth before breakfast.    tamsulosin (FLOMAX) 0.4 mg Cap Take 0.4 mg by mouth once daily.     Family History       Problem Relation (Age of Onset)    No Known Problems Mother, Father          Tobacco Use    Smoking status: Every Day     Current packs/day: 1.00     Average packs/day: 1 pack/day for 25.0 years (25.0 ttl pk-yrs)     Types: Cigarettes    Smokeless tobacco: Never   Substance and Sexual Activity    Alcohol use: No    Drug use: No    Sexual activity: Not Currently     Review of Systems   Constitutional:  Positive for fatigue and fever. Negative for activity change and chills.   HENT:  Negative for trouble swallowing.    Eyes:  Negative for photophobia and visual disturbance.   Respiratory:  Negative for chest tightness, shortness of breath and wheezing.    Cardiovascular:  Negative for chest pain, palpitations and leg swelling.   Gastrointestinal:  Negative for abdominal pain, constipation, diarrhea, nausea and vomiting.   Genitourinary:  Positive for hematuria. Negative for dysuria, frequency and urgency.   Musculoskeletal:  Negative for arthralgias, back pain and gait " problem.   Skin:  Negative for color change and rash.   Neurological:  Negative for syncope, weakness, light-headedness, numbness and headaches.   Psychiatric/Behavioral:  Negative for agitation and confusion. The patient is not nervous/anxious.      Objective:     Vital Signs (Most Recent):  Temp: 99.1 °F (37.3 °C) (08/18/24 1000)  Pulse: 106 (08/18/24 1000)  Resp: 20 (08/18/24 1000)  BP: 126/76 (08/18/24 1000)  SpO2: (!) 94 % (08/18/24 1000) Vital Signs (24h Range):  Temp:  [99.1 °F (37.3 °C)-99.9 °F (37.7 °C)] 99.1 °F (37.3 °C)  Pulse:  [106-133] 106  Resp:  [17-22] 20  SpO2:  [94 %-96 %] 94 %  BP: (106-138)/(63-76) 126/76     Weight: 63.5 kg (140 lb)  Body mass index is 24.03 kg/m².     Physical Exam  Vitals and nursing note reviewed.   Constitutional:       General: He is not in acute distress.     Appearance: Normal appearance. He is not ill-appearing.   HENT:      Head: Normocephalic and atraumatic.      Right Ear: External ear normal.      Left Ear: External ear normal.   Eyes:      Extraocular Movements: Extraocular movements intact.      Conjunctiva/sclera: Conjunctivae normal.      Pupils: Pupils are equal, round, and reactive to light.   Cardiovascular:      Rate and Rhythm: Normal rate and regular rhythm.      Pulses: Normal pulses.      Heart sounds: Normal heart sounds. No murmur heard.  Pulmonary:      Effort: Pulmonary effort is normal. No respiratory distress.      Breath sounds: Wheezing (mild; diffuse) present.   Abdominal:      General: Abdomen is flat. Bowel sounds are normal.      Tenderness: There is no abdominal tenderness.      Comments: No CVA tenderness   Musculoskeletal:         General: Normal range of motion.      Cervical back: Normal range of motion and neck supple.      Right lower leg: No edema.      Left lower leg: No edema.   Skin:     General: Skin is warm and dry.      Capillary Refill: Capillary refill takes less than 2 seconds.      Coloration: Skin is not jaundiced.  "  Neurological:      General: No focal deficit present.      Mental Status: He is alert and oriented to person, place, and time. Mental status is at baseline.      Cranial Nerves: No cranial nerve deficit.   Psychiatric:         Mood and Affect: Mood normal.         Behavior: Behavior normal.              CRANIAL NERVES     CN III, IV, VI   Pupils are equal, round, and reactive to light.       Significant Labs: All pertinent labs within the past 24 hours have been reviewed.  Blood Culture: No results for input(s): "LABBLOO" in the last 48 hours.  BMP:   Recent Labs   Lab 08/18/24  0818   *      K 3.7      CO2 23   BUN 19   CREATININE 1.8*   CALCIUM 9.7     CBC:   Recent Labs   Lab 08/18/24 0818   WBC 22.76*   HGB 14.3   HCT 42.4        CMP:   Recent Labs   Lab 08/18/24  0818      K 3.7      CO2 23   *   BUN 19   CREATININE 1.8*   CALCIUM 9.7   PROT 7.3   ALBUMIN 3.2*   BILITOT 0.9   ALKPHOS 99   AST 20   ALT 13   ANIONGAP 13     Cardiac Markers:   Recent Labs   Lab 08/18/24  0818   *  102*     Troponin:   Recent Labs   Lab 08/18/24  0818 08/18/24  1036   TROPONINI 0.019 0.009     Urine Studies:   Recent Labs   Lab 08/18/24  0927   COLORU Orange*   APPEARANCEUA Cloudy*   PHUR 6.0   SPECGRAV 1.015   PROTEINUA 1+*   GLUCUA Negative   KETONESU Negative   BILIRUBINUA Negative   OCCULTUA 3+*   NITRITE Positive*   LEUKOCYTESUR 3+*   RBCUA >100*   WBCUA >100*   BACTERIA Many*   HYALINECASTS 0       Significant Imaging: I have reviewed all pertinent imaging results/findings within the past 24 hours.  "

## 2024-08-18 NOTE — ASSESSMENT & PLAN NOTE
AMBROCIO is likely due to sepsis/UTI. Baseline creatinine is  1.2 . Most recent creatinine and eGFR are listed below.  Recent Labs     08/18/24  0818   CREATININE 1.8*   EGFRNORACEVR 41.3*      Plan  - Given 1L IVF in ED, giving an additional 1L per sepsis protocol  - Avoid nephrotoxins and renally dose meds for GFR listed above  - Monitor urine output, serial BMP, and adjust therapy as needed

## 2024-08-18 NOTE — ED NOTES
I-STAT Chem-8+ Results:   Value Reference Range   Sodium 137 136-145 mmol/L   Potassium  3.7 3.5-5.1 mmol/L   Chloride 102  mmol/L   Ionized Calcium 1.12 1.06-1.42 mmol/L   CO2 (measured) 25 23-29 mmol/L   Glucose 141  mg/dL   BUN 19 6-30 mg/dL   Creatinine 1.8 0.5-1.4 mg/dL   Hematocrit 46 36-54%

## 2024-08-18 NOTE — H&P
"[facility] - Emergency Dept  [facility] Medicine  History & Physical    Patient Name: Darshan Hoskins  MRN: [id number]  Patient Class: IP- Inpatient  Admission Date: 8/18/2024  Attending Physician: Charisse Howell MD   Primary Care Provider: Suzette Madden Of         Patient information was obtained from patient and ER records.     Subjective:     Principal Problem:Sepsis due to urinary tract infection    Chief Complaint:   Chief Complaint   Patient presents with    Dizziness     C/o dizziness and nausea since Friday. Had chest pain that went away        HPI: Darshan Hoskins is a 63 y.o. male with PMHx significant for BPH and CKD admitted to hospital medicine for sepsis 2/2 UTI. Patient reports dizziness/fatigue, emesis and subjective fever for the past 2 days. Also endorses noting cloudy, red urine today, which prompted his visit to the ED. Denies CP, SOB, abdominal pain, back pain, changes in BMs, urinary symptoms. Of note, patient was admitted to [facility] in Sept of 2022 for sepsis 2/2 pan sensitive E. Coli UTI. States his symptoms today feel similar to previous UTI admission.    Upon arrival to the ED, patient was tachy to 133 with /63, temp 99.9, and SpO2 94%% on RA. Labs remarkable for WBC 22.76, Cr 1.8 (baseline 1.2), , troponin 0.019. UA with 1+ protein, 3+ blood, + nitrites, 3+ leuks, >100 RBCs, >100 WBCs. EKG with sinus tach to 113. CXR with no acute process. Given 1L IVF and CTX x1.     Past Medical History:   Diagnosis Date    Anemia     Anxiety     Arthritis     Back pain     Carpal tunnel syndrome     GERD (gastroesophageal reflux disease)     Stomach ulcer     "bleeding"       History reviewed. No pertinent surgical history.    Review of patient's allergies indicates:  No Known Allergies    No current facility-administered medications on file prior to encounter.     Current Outpatient Medications on File Prior to Encounter   Medication Sig    albuterol sulfate (VENTOLIN HFA INHL) " Inhale into the lungs.    benzonatate (TESSALON) 200 MG capsule Take 1 capsule (200 mg total) by mouth 3 (three) times daily as needed for Cough.    budesonide-formoterol 160-4.5 mcg (SYMBICORT) 160-4.5 mcg/actuation HFAA Inhale 2 puffs into the lungs every 12 (twelve) hours. Controller    esomeprazole (NEXIUM) 20 MG capsule Take 20 mg by mouth before breakfast.    tamsulosin (FLOMAX) 0.4 mg Cap Take 0.4 mg by mouth once daily.     Family History       Problem Relation (Age of Onset)    No Known Problems Mother, Father          Tobacco Use    Smoking status: Every Day     Current packs/day: 1.00     Average packs/day: 1 pack/day for 25.0 years (25.0 ttl pk-yrs)     Types: Cigarettes    Smokeless tobacco: Never   Substance and Sexual Activity    Alcohol use: No    Drug use: No    Sexual activity: Not Currently     Review of Systems   Constitutional:  Positive for fatigue and fever. Negative for activity change and chills.   HENT:  Negative for trouble swallowing.    Eyes:  Negative for photophobia and visual disturbance.   Respiratory:  Negative for chest tightness, shortness of breath and wheezing.    Cardiovascular:  Negative for chest pain, palpitations and leg swelling.   Gastrointestinal:  Negative for abdominal pain, constipation, diarrhea, nausea and vomiting.   Genitourinary:  Positive for hematuria. Negative for dysuria, frequency and urgency.   Musculoskeletal:  Negative for arthralgias, back pain and gait problem.   Skin:  Negative for color change and rash.   Neurological:  Negative for syncope, weakness, light-headedness, numbness and headaches.   Psychiatric/Behavioral:  Negative for agitation and confusion. The patient is not nervous/anxious.      Objective:     Vital Signs (Most Recent):  Temp: 99.1 °F (37.3 °C) (08/18/24 1000)  Pulse: 106 (08/18/24 1000)  Resp: 20 (08/18/24 1000)  BP: 126/76 (08/18/24 1000)  SpO2: (!) 94 % (08/18/24 1000) Vital Signs (24h Range):  Temp:  [99.1 °F (37.3 °C)-99.9 °F  (37.7 °C)] 99.1 °F (37.3 °C)  Pulse:  [106-133] 106  Resp:  [17-22] 20  SpO2:  [94 %-96 %] 94 %  BP: (106-138)/(63-76) 126/76     Weight: 63.5 kg (140 lb)  Body mass index is 24.03 kg/m².     Physical Exam  Vitals and nursing note reviewed.   Constitutional:       General: He is not in acute distress.     Appearance: Normal appearance. He is not ill-appearing.   HENT:      Head: Normocephalic and atraumatic.      Right Ear: External ear normal.      Left Ear: External ear normal.   Eyes:      Extraocular Movements: Extraocular movements intact.      Conjunctiva/sclera: Conjunctivae normal.      Pupils: Pupils are equal, round, and reactive to light.   Cardiovascular:      Rate and Rhythm: Normal rate and regular rhythm.      Pulses: Normal pulses.      Heart sounds: Normal heart sounds. No murmur heard.  Pulmonary:      Effort: Pulmonary effort is normal. No respiratory distress.      Breath sounds: Wheezing (mild; diffuse) present.   Abdominal:      General: Abdomen is flat. Bowel sounds are normal.      Tenderness: There is no abdominal tenderness.      Comments: No CVA tenderness   Musculoskeletal:         General: Normal range of motion.      Cervical back: Normal range of motion and neck supple.      Right lower leg: No edema.      Left lower leg: No edema.   Skin:     General: Skin is warm and dry.      Capillary Refill: Capillary refill takes less than 2 seconds.      Coloration: Skin is not jaundiced.   Neurological:      General: No focal deficit present.      Mental Status: He is alert and oriented to person, place, and time. Mental status is at baseline.      Cranial Nerves: No cranial nerve deficit.   Psychiatric:         Mood and Affect: Mood normal.         Behavior: Behavior normal.              CRANIAL NERVES     CN III, IV, VI   Pupils are equal, round, and reactive to light.       Significant Labs: All pertinent labs within the past 24 hours have been reviewed.  Blood Culture: No results for  "input(s): "LABBLOO" in the last 48 hours.  BMP:   Recent Labs   Lab 08/18/24  0818   *      K 3.7      CO2 23   BUN 19   CREATININE 1.8*   CALCIUM 9.7     CBC:   Recent Labs   Lab 08/18/24  0818   WBC 22.76*   HGB 14.3   HCT 42.4        CMP:   Recent Labs   Lab 08/18/24  0818      K 3.7      CO2 23   *   BUN 19   CREATININE 1.8*   CALCIUM 9.7   PROT 7.3   ALBUMIN 3.2*   BILITOT 0.9   ALKPHOS 99   AST 20   ALT 13   ANIONGAP 13     Cardiac Markers:   Recent Labs   Lab 08/18/24  0818   *  102*     Troponin:   Recent Labs   Lab 08/18/24  0818 08/18/24  1036   TROPONINI 0.019 0.009     Urine Studies:   Recent Labs   Lab 08/18/24  0927   COLORU Orange*   APPEARANCEUA Cloudy*   PHUR 6.0   SPECGRAV 1.015   PROTEINUA 1+*   GLUCUA Negative   KETONESU Negative   BILIRUBINUA Negative   OCCULTUA 3+*   NITRITE Positive*   LEUKOCYTESUR 3+*   RBCUA >100*   WBCUA >100*   BACTERIA Many*   HYALINECASTS 0       Significant Imaging: I have reviewed all pertinent imaging results/findings within the past 24 hours.  Assessment/Plan:     * Sepsis due to urinary tract infection  This patient does have evidence of infective focus  My overall impression is sepsis.  Source: Urinary Tract  Antibiotics given-   Antibiotics (72h ago, onward)      Start     Stop Route Frequency Ordered    08/19/24 0000  cefTRIAXone (Rocephin) 1 g in D5W 100 mL IVPB (MB+)         -- IV Every 24 hours (non-standard times) 08/18/24 1116    08/18/24 1045  cefTRIAXone (Rocephin) 1 g in D5W 100 mL IVPB (MB+)         08/18/24 2244 IV ED 1 Time 08/18/24 1033          Latest lactate reviewed-  Recent Labs   Lab 08/18/24  0825   POCLAC 1.58     Organ dysfunction indicated by Acute kidney injury    Fluid challenge Actual Body weight- Patient will receive 30ml/kg actual body weight to calculate fluid bolus for treatment of septic shock.     Will Not start Pressors- Levophed for MAP of 65  Source control achieved by:   - UA " with 3+ leuks, 3+ blood, >100 WBC, >100 RBCs  - given 1L IVF in the ED, 1L of LR ordered per sepsis protocol  - CTX 1g q24h    Acute renal failure superimposed on chronic kidney disease  AMBROCIO is likely due to sepsis/UTI. Baseline creatinine is  1.2 . Most recent creatinine and eGFR are listed below.  Recent Labs     08/18/24 0818   CREATININE 1.8*   EGFRNORACEVR 41.3*      Plan  - Given 1L IVF in ED, giving an additional 1L per sepsis protocol  - Avoid nephrotoxins and renally dose meds for GFR listed above  - Monitor urine output, serial BMP, and adjust therapy as needed      COPD (chronic obstructive pulmonary disease)  Patient's COPD is controlled currently.  Patient is currently off COPD Pathway. Continue scheduled inhalers and monitor respiratory status closely.   - stable with SpO2 >94% on RA  - mild wheezing noted on exam  - continue home inhalers  - duonebs prn    BPH (benign prostatic hyperplasia)  - continue home flomax        VTE Risk Mitigation (From admission, onward)           Ordered     IP VTE LOW RISK PATIENT  Once         08/18/24 1116     Place sequential compression device  Until discontinued         08/18/24 1116                                    Melina Giraldo PA-C  Department of Hospital Medicine  [facility] - Emergency Dept

## 2024-08-18 NOTE — ASSESSMENT & PLAN NOTE
Patient's COPD is controlled currently.  Patient is currently off COPD Pathway. Continue scheduled inhalers and monitor respiratory status closely.   - stable with SpO2 >94% on RA  - mild wheezing noted on exam  - continue home inhalers  - rivas prn

## 2024-08-18 NOTE — ED PROVIDER NOTES
"Encounter Date: 8/18/2024       History     Chief Complaint   Patient presents with    Dizziness     C/o dizziness and nausea since Friday. Had chest pain that went away     Patient is a 63-year-old male with past medical history of CKD.  Patient is presenting for 2 day history of dizziness.  Patient denies chest pain at this time.  Patient endorses subjective fever at home for the past 2 days.  Patient endorses vomiting.  Denies hematemesis and hemoptysis.  Patient denies history of variceal leads.  Patient denies coffee-ground emesis.  Patient denies melena or dark tarry stools.  Patient denies bright red blood per rectum.  No chest pain.  No focal weakness or numbness.  No vision changes.  No facial droop.  No speech changes.  No abdominal pain.  Does report darker urine.    Patient is endorsing feeling dizzy right now.        Review of patient's allergies indicates:  No Known Allergies  Past Medical History:   Diagnosis Date    Anemia     Anxiety     Arthritis     Back pain     Carpal tunnel syndrome     GERD (gastroesophageal reflux disease)     Stomach ulcer     "bleeding"     History reviewed. No pertinent surgical history.  Family History   Problem Relation Name Age of Onset    No Known Problems Mother      No Known Problems Father       Social History     Tobacco Use    Smoking status: Every Day     Current packs/day: 1.00     Average packs/day: 1 pack/day for 25.0 years (25.0 ttl pk-yrs)     Types: Cigarettes    Smokeless tobacco: Never   Substance Use Topics    Alcohol use: No    Drug use: No     Review of Systems    Physical Exam     Initial Vitals [08/18/24 0726]   BP Pulse Resp Temp SpO2   106/63 (!) 133 (!) 22 99.9 °F (37.7 °C) (!) 94 %      MAP       --         Physical Exam    Nursing note and vitals reviewed.  Constitutional: He appears well-developed and well-nourished.   HENT:   Head: Normocephalic and atraumatic.   Eyes: EOM are normal. No scleral icterus.   Neck: Neck supple. No thyromegaly " present. No tracheal deviation present. No JVD present.   Normal range of motion.  Cardiovascular:  Normal rate, regular rhythm, normal heart sounds and intact distal pulses.     Exam reveals no gallop and no friction rub.       No murmur heard.  Pulmonary/Chest: Breath sounds normal. No stridor. No respiratory distress. He has no wheezes. He has no rales. He exhibits no tenderness.   Abdominal: Abdomen is soft. He exhibits no distension. There is no abdominal tenderness. There is no rebound.   Musculoskeletal:      Cervical back: Normal range of motion and neck supple.     Neurological: He is alert and oriented to person, place, and time. He has normal strength. No cranial nerve deficit or sensory deficit. GCS score is 15. GCS eye subscore is 4. GCS verbal subscore is 5. GCS motor subscore is 6.   Skin: Skin is warm. Capillary refill takes less than 2 seconds.   Psychiatric: He has a normal mood and affect. His behavior is normal. Judgment and thought content normal.         ED Course   Procedures  Labs Reviewed   HIV 1 / 2 ANTIBODY   HEPATITIS C ANTIBODY          Imaging Results    None          Medications   sodium chloride 0.9% bolus 500 mL 500 mL (has no administration in time range)     Medical Decision Making  Differential diagnosis at this time includes sepsis versus CHF exacerbation versus acute coronary syndrome.    Life-threatening diagnosis is considered including sepsis were worked up.    Initially we decided on giving the patient 1 L of fluids given an unclear cardiac history.  Nine administration of 1 L to assess fluid status.  Initial volume status the patient appears euvolemic on echo which I independently interpreted along with a ejection fraction of approximately 50%, however it appears that the patient does have some element of cardiac hypertrophy without tamponade.    Following CMP result of a new AMBROCIO, we gave the patient an additional 500 mL of fluid given volume status exam which demonstrated  the patient was not retaining fluid, in addition his chest x-ray was clear.  We do not 2nd lactate given his 1st 1 was negative making sepsis unlikely.  We started 1 g of ceftriaxone per antibiotic guidelines for pyelonephritis.  Patient had also responded well to ceftriaxone in the past for his previous pyelonephritis.      EKG independently interpreted with findings of sinus tachycardia without evidence ischemic change or peaked T-waves.    I discussed this patient with hospital medicine.  They believe that he is a candidate for inpatient given his pyelonephritis and new AMBROCIO.      We started the patient on 1 g of ceftriaxone for presumed pyelonephritis which was sensitive to ceftriaxone about a week ago.  I discussed this case with hospital medicine who agreed to admit the patient to inpatient for pyelonephritis.              Amount and/or Complexity of Data Reviewed  External Data Reviewed: notes.     Details: Discharge summary reviewed from 09/29/2022 notable for an E coli UTI.    Stress test note reviewed from 03/25/2024, dress test for ischemia at that time.  Labs: ordered. Decision-making details documented in ED Course.  Radiology: ordered and independent interpretation performed. Decision-making details documented in ED Course.  ECG/medicine tests: ordered and independent interpretation performed.     Details: Normal sinus rhythm.  No axis deviation or signs of ischemia.  No T-wave inversion no peaked T-waves.    Risk  Prescription drug management.  Drug therapy requiring intensive monitoring for toxicity.  Decision regarding hospitalization.  Risk Details: Patient is high-risk given pyelonephritis in the setting of CKD.  Additionally worsening a buck or is need for inpatient medicine and IV antibiotic therapy.              Attending Attestation:   Physician Attestation Statement for Resident:  As the supervising MD   Physician Attestation Statement: I have personally seen and examined this patient.   I  agree with the above history.  -:   As the supervising MD I agree with the above PE.     As the supervising MD I agree with the above treatment, course, plan, and disposition.                    ED Course as of 08/18/24 1031   Sun Aug 18, 2024   0827 Chest x-ray independently interpreted with normal cardiac silhouette and a small focal consolidation on the right.  [KW]   0828 Patient reassessed at this time, patient was still dizzy, but without new complaints.   [KW]   0858 WBC(!): 22.76  Lactate is normal which is reassuring for end-organ perfusion. [KW]   0902 Patient reassessed.  Patient is tolerating fluids well patient is not appear volume overloaded on exam.  Patient's heart rate has come down to 106. [KW]   0906 We decided not to give the patient additional fluids given his initial BNP of 102.  Patient tolerated 1 L of fluids well. [KW]   0916 Creatinine(!): 1.8  Patient appears to have new AMBROCIO.  Baseline creatinine is 1.2 today is 1.8.  I gave the patient 0.5 L of lactated Ringer's I will reassess after administration. [KW]      ED Course User Index  [KW] Kirit Bond MD          Leukocytosis hyperglycemia hypoalbuminemia UTI on nephritis                 Clinical Impression:  Final diagnoses:  [R53.1] Weakness                 Kirit Bond MD  Resident  08/18/24 1604       Joan Rodrigues MD  08/19/24 1160

## 2024-08-18 NOTE — PHARMACY MED REC
"      Admission Medication History     The home medication history was taken by Cora Blas.    You may go to "Admission" then "Reconcile Home Medications" tabs to review and/or act upon these items.     The home medication list has been updated by the Pharmacy department.   Please read ALL comments highlighted in yellow.   Please address this information as you see fit.    Feel free to contact us if you have any questions or require assistance.        Medications listed below were obtained from: Patient/family and Analytic software- ebookpie  Current Outpatient Medications on File Prior to Encounter   Medication Sig    albuterol sulfate (VENTOLIN HFA INHL)   Inhale 1 puff into the lungs every 6 (six) hours as needed for shortness of breath.    benzonatate (TESSALON) 200 MG capsule   Take 1 capsule (200 mg total) by mouth 3 (three) times daily as needed for cough.    budesonide-formoterol 160-4.5 mcg (SYMBICORT) 160-4.5 mcg/actuation HFAA   Inhale 2 puffs into the lungs every 12 (twelve) hours.     esomeprazole (NEXIUM) 20 MG capsule   Take 20 mg by mouth before breakfast.    naproxen (NAPROSYN) 500 MG tablet   Take 500 mg by mouth 2 (two) times daily as needed for pain.    pantoprazole (PROTONIX) 40 MG tablet   Take 40 mg by mouth once daily.    tamsulosin (FLOMAX) 0.4 mg Cap   Take 0.4 mg by mouth once daily.         Cora Blas  EXT [id number]            .          "

## 2024-08-18 NOTE — Clinical Note
Diagnosis: Tachycardia [536035]   Future Attending Provider: ZULAY SMITH [[id number]]   Reason for IP Medical Treatment  (Clinical interventions that can only be accomplished in the IP setting? ) :: Pyelonephritis with AMBROCIO

## 2024-08-19 LAB
ANION GAP SERPL CALC-SCNC: 10 MMOL/L (ref 8–16)
BASOPHILS # BLD AUTO: 0.01 K/UL (ref 0–0.2)
BASOPHILS NFR BLD: 0.1 % (ref 0–1.9)
BUN SERPL-MCNC: 19 MG/DL (ref 8–23)
CALCIUM SERPL-MCNC: 9.3 MG/DL (ref 8.7–10.5)
CHLORIDE SERPL-SCNC: 102 MMOL/L (ref 95–110)
CO2 SERPL-SCNC: 27 MMOL/L (ref 23–29)
CREAT SERPL-MCNC: 1.5 MG/DL (ref 0.5–1.4)
DIFFERENTIAL METHOD BLD: ABNORMAL
EOSINOPHIL # BLD AUTO: 0 K/UL (ref 0–0.5)
EOSINOPHIL NFR BLD: 0.1 % (ref 0–8)
ERYTHROCYTE [DISTWIDTH] IN BLOOD BY AUTOMATED COUNT: 14 % (ref 11.5–14.5)
EST. GFR  (NO RACE VARIABLE): 51.3 ML/MIN/1.73 M^2
GLUCOSE SERPL-MCNC: 96 MG/DL (ref 70–110)
HCT VFR BLD AUTO: 43.2 % (ref 40–54)
HGB BLD-MCNC: 14.1 G/DL (ref 14–18)
IMM GRANULOCYTES # BLD AUTO: 0.04 K/UL (ref 0–0.04)
IMM GRANULOCYTES NFR BLD AUTO: 0.4 % (ref 0–0.5)
LYMPHOCYTES # BLD AUTO: 0.9 K/UL (ref 1–4.8)
LYMPHOCYTES NFR BLD: 9 % (ref 18–48)
MAGNESIUM SERPL-MCNC: 2 MG/DL (ref 1.6–2.6)
MCH RBC QN AUTO: 32 PG (ref 27–31)
MCHC RBC AUTO-ENTMCNC: 32.6 G/DL (ref 32–36)
MCV RBC AUTO: 98 FL (ref 82–98)
MONOCYTES # BLD AUTO: 0.6 K/UL (ref 0.3–1)
MONOCYTES NFR BLD: 5.4 % (ref 4–15)
NEUTROPHILS # BLD AUTO: 8.9 K/UL (ref 1.8–7.7)
NEUTROPHILS NFR BLD: 85 % (ref 38–73)
NRBC BLD-RTO: 0 /100 WBC
PLATELET # BLD AUTO: 263 K/UL (ref 150–450)
PMV BLD AUTO: 9.3 FL (ref 9.2–12.9)
POTASSIUM SERPL-SCNC: 4.3 MMOL/L (ref 3.5–5.1)
RBC # BLD AUTO: 4.4 M/UL (ref 4.6–6.2)
SODIUM SERPL-SCNC: 139 MMOL/L (ref 136–145)
WBC # BLD AUTO: 10.5 K/UL (ref 3.9–12.7)

## 2024-08-19 PROCEDURE — 63600175 PHARM REV CODE 636 W HCPCS: Performed by: STUDENT IN AN ORGANIZED HEALTH CARE EDUCATION/TRAINING PROGRAM

## 2024-08-19 PROCEDURE — 87040 BLOOD CULTURE FOR BACTERIA: CPT | Mod: 59 | Performed by: HOSPITALIST

## 2024-08-19 PROCEDURE — 25000003 PHARM REV CODE 250: Performed by: PHYSICIAN ASSISTANT

## 2024-08-19 PROCEDURE — 36415 COLL VENOUS BLD VENIPUNCTURE: CPT | Performed by: HOSPITALIST

## 2024-08-19 PROCEDURE — 21400001 HC TELEMETRY ROOM

## 2024-08-19 PROCEDURE — 85025 COMPLETE CBC W/AUTO DIFF WBC: CPT | Performed by: PHYSICIAN ASSISTANT

## 2024-08-19 PROCEDURE — 94761 N-INVAS EAR/PLS OXIMETRY MLT: CPT

## 2024-08-19 PROCEDURE — 63600175 PHARM REV CODE 636 W HCPCS: Performed by: PHYSICIAN ASSISTANT

## 2024-08-19 PROCEDURE — 27000221 HC OXYGEN, UP TO 24 HOURS

## 2024-08-19 PROCEDURE — 83735 ASSAY OF MAGNESIUM: CPT | Performed by: PHYSICIAN ASSISTANT

## 2024-08-19 PROCEDURE — 80048 BASIC METABOLIC PNL TOTAL CA: CPT | Performed by: PHYSICIAN ASSISTANT

## 2024-08-19 RX ORDER — HEPARIN SODIUM 5000 [USP'U]/ML
5000 INJECTION, SOLUTION INTRAVENOUS; SUBCUTANEOUS EVERY 8 HOURS
Status: DISCONTINUED | OUTPATIENT
Start: 2024-08-19 | End: 2024-08-22 | Stop reason: HOSPADM

## 2024-08-19 RX ADMIN — PANTOPRAZOLE SODIUM 40 MG: 40 TABLET, DELAYED RELEASE ORAL at 08:08

## 2024-08-19 RX ADMIN — CEFTRIAXONE SODIUM 1 G: 1 INJECTION, POWDER, FOR SOLUTION INTRAMUSCULAR; INTRAVENOUS at 12:08

## 2024-08-19 RX ADMIN — SODIUM CHLORIDE, POTASSIUM CHLORIDE, SODIUM LACTATE AND CALCIUM CHLORIDE 500 ML: 600; 310; 30; 20 INJECTION, SOLUTION INTRAVENOUS at 04:08

## 2024-08-19 RX ADMIN — ACETAMINOPHEN 650 MG: 325 TABLET ORAL at 04:08

## 2024-08-19 RX ADMIN — TAMSULOSIN HYDROCHLORIDE 0.4 MG: 0.4 CAPSULE ORAL at 08:08

## 2024-08-19 RX ADMIN — HEPARIN SODIUM 5000 UNITS: 5000 INJECTION INTRAVENOUS; SUBCUTANEOUS at 09:08

## 2024-08-19 RX ADMIN — ACETAMINOPHEN 650 MG: 325 TABLET ORAL at 06:08

## 2024-08-19 RX ADMIN — HEPARIN SODIUM 5000 UNITS: 5000 INJECTION INTRAVENOUS; SUBCUTANEOUS at 02:08

## 2024-08-19 NOTE — ASSESSMENT & PLAN NOTE
AMBROCIO is likely due to sepsis/UTI. Baseline creatinine is  1.2 . Most recent creatinine and eGFR are listed below.  Recent Labs     08/18/24  0818 08/19/24  0521   CREATININE 1.8* 1.5*   EGFRNORACEVR 41.3* 51.3*        Plan  - s/p 2L Ivf on admission   - Avoid nephrotoxins and renally dose meds for GFR listed above  - Monitor urine output, serial BMP, and adjust therapy as needed

## 2024-08-19 NOTE — HOSPITAL COURSE
Patient admitted for sepsis secondary to UTI. Blood cultures drawn on admission quickly positive for e coli. Repeat blood cultures negative thus far. Febrile to 102.8 early AM 8/21. Continuing ceftriaxone. If afebrile for 24 hours, will switch to PO antibiotics.   8/22 Afebrile for 24h. BC x2 8/19 NGTD. switch to ciprofloxacin for 14 days. renal songram ordered

## 2024-08-19 NOTE — PLAN OF CARE
[facility] - Med S[facility]  Initial Discharge Assessment       Primary Care Provider: Suzette Madden Of    Admission Diagnosis: Hypoalbuminemia [E88.09]  Weakness [R53.1]  Tachycardia [R00.0]  Chest pain [R07.9]  Acute kidney injury [N17.9]    Admission Date: 8/18/2024  Expected Discharge Date: 8/22/2024    Transition of Care Barriers: None    Payor: HUMANA MANAGED MEDICARE / Plan: [employer] HMO PPO SPECIAL NEEDS / Product Type: Medicare Advantage /     Extended Emergency Contact Information  Primary Emergency Contact: Robson Hoskins  Mobile Phone: [phone]  Relation: Relative    Discharge Plan A: Home  Discharge Plan B: Home with family      [id number] [address]  [address]  [facility] [place] [address]  Phone: [phone] Fax: [phone]      Initial Assessment (most recent)       Adult Discharge Assessment - 08/19/24 0817          Discharge Assessment    Assessment Type Discharge Planning Assessment     Confirmed/corrected address, phone number and insurance Yes     Confirmed Demographics Correct on Facesheet     Source of Information patient     If unable to respond/provide information was family/caregiver contacted? No Contact Information Available     Communicated KAPIL with patient/caregiver Date not available/Unable to determine     Reason For Admission Sepsis due to urinary tract infection     People in Home alone     Do you expect to return to your current living situation? Yes     Do you have help at home or someone to help you manage your care at home? Yes     Who are your caregiver(s) and their phone number(s)? Relative: Robson Hoskins [phone]     Prior to hospitilization cognitive status: Alert/Oriented     Current cognitive status: Alert/Oriented     Walking or Climbing Stairs Difficulty no     Dressing/Bathing Difficulty no     Equipment Currently Used at Home none     Patient currently being followed by outpatient case management? No     Do you  currently have service(s) that help you manage your care at home? No     Do you take prescription medications? Yes     Do you have prescription coverage? Yes     Coverage [employer]     Do you have any problems affording any of your prescribed medications? No     Is the patient taking medications as prescribed? yes     Who is going to help you get home at discharge? Self-transport     How do you get to doctors appointments? car, drives self;family or friend will provide     Are you on dialysis? No     Do you take coumadin? No     Discharge Plan A Home     Discharge Plan B Home with family     DME Needed Upon Discharge  none     Discharge Plan discussed with: Patient     Transition of Care Barriers None                   SW met with the patient at the bedside and discussed the discharge plan. Gave him the discharge booklet and placed contact numbers on the white board in the room. Patient alert and sitting up in bed.  Patient verified his name , , PCP, Insurance and Pharmacy. Stated he lives alone and has 17 steps to point of entry . Prior to admission patient was independent with all ADLS and wasn't receiving any HH services. He is not on coumadin.     Dialysis: N/A  DME's include:non reported.  He takes medication as prescribed and has no problems getting  medication. Self- transport at [place].      MY Hernández  Department of Case Management   [facility]  [address] [place] [address]. [place] [address]  Phone : [phone]      Discharge Plan A and Plan B have been determined by review of patient's clinical status, future medical and therapeutic needs, and coverage/benefits for post-acute care in coordination with multidisciplinary team members.

## 2024-08-19 NOTE — PLAN OF CARE
AAOX4, fluids given, bed in low position, bed locked, side rails up x 2, and call light within reach.    Problem: Adult Inpatient Plan of Care  Goal: Plan of Care Review  Outcome: Progressing  Goal: Patient-Specific Goal (Individualized)  Outcome: Progressing  Goal: Absence of Hospital-Acquired Illness or Injury  Outcome: Progressing  Goal: Optimal Comfort and Wellbeing  Outcome: Progressing  Goal: Readiness for Transition of Care  Outcome: Progressing     Problem: Sepsis/Septic Shock  Goal: Optimal Coping  Outcome: Progressing  Goal: Absence of Bleeding  Outcome: Progressing  Goal: Blood Glucose Level Within Targeted Range  Outcome: Progressing  Goal: Absence of Infection Signs and Symptoms  Outcome: Progressing  Goal: Optimal Nutrition Intake  Outcome: Progressing     Problem: Acute Kidney Injury/Impairment  Goal: Fluid and Electrolyte Balance  Outcome: Progressing  Goal: Improved Oral Intake  Outcome: Progressing  Goal: Effective Renal Function  Outcome: Progressing

## 2024-08-19 NOTE — ED NOTES
Telemetry Verification   Patient placed on Telemetry Box  Verified on ED monitor  Box 1577   Monitor Tech jose juan   Rate 103   Rhythm Sinus tachy

## 2024-08-19 NOTE — NURSING
Admission done, stable VS, pt came with belongings, alert and oriented, independent, calm, POC reviewed and agreed, 2 lpm on NC, no abnormal sounds on lung/heart, 2 + radial/ dorsalis pedi pulses, skin intact, denied any pain/ heartburn, pt had no BM since came to the hospital, urinal bag at the bed side, instructed how to use call light, side rails upx2, bed in the lowest position, no need at this moment.

## 2024-08-19 NOTE — ASSESSMENT & PLAN NOTE
Patient's COPD is controlled currently.  Patient is currently off COPD Pathway. Continue scheduled inhalers and monitor respiratory status closely.   - goal spO2 88%   - continue home inhalers  - duonebs prn

## 2024-08-19 NOTE — PLAN OF CARE
Problem: Adult Inpatient Plan of Care  Goal: Plan of Care Review  Outcome: Progressing  Goal: Patient-Specific Goal (Individualized)  Outcome: Progressing  Goal: Absence of Hospital-Acquired Illness or Injury  Outcome: Progressing  Goal: Optimal Comfort and Wellbeing  Outcome: Progressing  Goal: Readiness for Transition of Care  Outcome: Progressing     Problem: Sepsis/Septic Shock  Goal: Optimal Coping  Outcome: Progressing  Goal: Absence of Bleeding  Outcome: Progressing  Goal: Blood Glucose Level Within Targeted Range  Outcome: Progressing  Goal: Absence of Infection Signs and Symptoms  Outcome: Progressing  Goal: Optimal Nutrition Intake  Outcome: Progressing     Problem: Acute Kidney Injury/Impairment  Goal: Fluid and Electrolyte Balance  Outcome: Progressing  Goal: Improved Oral Intake  Outcome: Progressing  Goal: Effective Renal Function  Outcome: Progressing   Pt c/o headache, given prn Tylenol.

## 2024-08-19 NOTE — CLINICAL REVIEW
IP Sepsis Screen (most recent)       Sepsis Screen (IP) - 08/19/24 0942       Is the patient's history or complaint suggestive of a possible infection? Yes  -TR    Are there at least two of the following signs and symptoms present? Yes  -TR    Sepsis signs/symptoms - Hyper or Hypothermia Hyperthermia >100.4 or Hypothermia < 96.8  -TR    Sepsis signs/symptoms - Tachycardia Tachycardia     >90  -TR    Are any of the following organ dysfunction criteria present and not considered to be due to a chronic condition? Yes  -TR    Organ Dysfunction Criteria - O2 O2 Saturation < 95% on room air  -TR    Initiate Sepsis Protocol No   WBCs trending down; initial blood cx (+) gm neg rods - repeat cultures drawn; IVAB -TR    Reason sepsis not considered Pt. receiving appropriate management  -TR              User Key  (r) = Recorded By, (t) = Taken By, (c) = Cosigned By      Initials Name    Camelia Matias RN

## 2024-08-19 NOTE — CLINICAL REVIEW
"RAPID RESPONSE NURSE CHART REVIEW        Chart Reviewed: 08/19/2024, 7:37 AM    MRN: [id number]  Bed: 603/603 A    Dx: Sepsis due to urinary tract infection    Darshan Hoskins has a past medical history of Anemia, Anxiety, Arthritis, Back pain, Carpal tunnel syndrome, GERD (gastroesophageal reflux disease), and Stomach ulcer.    Last VS: /80   Pulse 105   Temp 99.1 °F (37.3 °C) (Oral)   Resp 18   Ht 5' 4" (1.626 m)   Wt 59.5 kg (131 lb 2.8 oz)   SpO2 (!) 92%   BMI 22.52 kg/m²     24H Vital Sign Range:  Temp:  [97.7 °F (36.5 °C)-99.1 °F (37.3 °C)]   Pulse:  []   Resp:  [15-20]   BP: (111-143)/(69-80)   SpO2:  [92 %-100 %]     Level of Consciousness (AVPU): alert    Recent Labs     08/18/24  0818 08/19/24  0521   WBC 22.76* 10.50   HGB 14.3 14.1   HCT 42.4 43.2    263       Recent Labs     08/18/24  0818 08/19/24  0521    139   K 3.7 4.3    102   CO2 23 27   BUN 19 19   CREATININE 1.8* 1.5*   * 96   MG  --  2.0          OXYGEN:  Flow (L/min) (Oxygen Therapy): 2          MEWS score: 1    Rounding completed with charge BETHANIE Crum reports maintaining SPO2 on 2L. No additional concerns verbalized at this time. Instructed to call [phone] for further concerns or assistance.    Callie Garcia RN        "

## 2024-08-19 NOTE — NURSING
Nurses Note -- 4 Eyes      8/19/2024   2:57 AM      Skin assessed during: Admit      [x] No Altered Skin Integrity Present    []Prevention Measures Documented      [] Yes- Altered Skin Integrity Present or Discovered   [] LDA Added if Not in Epic (Describe Wound)   [] New Altered Skin Integrity was Present on Admit and Documented in LDA   [] Wound Image Taken    Wound Care Consulted? No    Attending Nurse:  Dayanna Kevin RN/Staff Member:   Beverley

## 2024-08-19 NOTE — PROGRESS NOTES
[facility]  Progress Note    Patient Name: Darshan Hoskins  MRN: [id number]  Patient Class: IP- Inpatient   Admission Date: 8/18/2024  Length of Stay: 1 days  Attending Physician: Charisse Howell MD  Primary Care Provider: Suzette Madden Of        Subjective:     Principal Problem:Sepsis due to urinary tract infection        HPI:  Darshan Hoskins is a 63 y.o. male with PMHx significant for BPH and CKD admitted to hospital medicine for sepsis 2/2 UTI. Patient reports dizziness/fatigue, emesis and subjective fever for the past 2 days. Also endorses noting cloudy, red urine today, which prompted his visit to the ED. Denies CP, SOB, abdominal pain, back pain, changes in BMs, urinary symptoms. Of note, patient was admitted to [facility] in Sept of 2022 for sepsis 2/2 pan sensitive E. Coli UTI. States his symptoms today feel similar to previous UTI admission.    Upon arrival to the ED, patient was tachy to 133 with /63, temp 99.9, and SpO2 94%% on RA. Labs remarkable for WBC 22.76, Cr 1.8 (baseline 1.2), , troponin 0.019. UA with 1+ protein, 3+ blood, + nitrites, 3+ leuks, >100 RBCs, >100 WBCs. EKG with sinus tach to 113. CXR with no acute process. Given 1L IVF and CTX x1.     Overview/Hospital Course:  Patient admitted for sepsis secondary to UTI. Blood cultures drawn on admission quickly positive for GNR - sensitivity pending. Repeat blood cultures collected. Continuing ceftriaxone.     Interval History: No acute events overnight. Patient has no acute complaints, still having some malaise but feels better than yesterday. Denies chest pain, SOB, n/v, c/d. No abdominal pain, difficulty urinating, or flank pain.       Review of Systems  Objective:     Vital Signs (Most Recent):  Temp: 99 °F (37.2 °C) (08/19/24 0740)  Pulse: 73 (08/19/24 1055)  Resp: 17 (08/19/24 0758)  BP: 105/61 (08/19/24 0740)  SpO2: 97 % (08/19/24 0758) Vital Signs (24h Range):  Temp:  [97.7 °F (36.5  °C)-99.1 °F (37.3 °C)] 99 °F (37.2 °C)  Pulse:  [] 73  Resp:  [15-20] 17  SpO2:  [92 %-100 %] 97 %  BP: (105-143)/(61-80) 105/61     Weight: 59.5 kg (131 lb 2.8 oz)  Body mass index is 22.52 kg/m².    Intake/Output Summary (Last 24 hours) at 8/19/2024 1217  Last data filed at 8/19/2024 0050  Gross per 24 hour   Intake 100 ml   Output --   Net 100 ml         Physical Exam  Vitals and nursing note reviewed.   Constitutional:       General: He is not in acute distress.     Appearance: Normal appearance. He is not ill-appearing.   HENT:      Head: Normocephalic and atraumatic.      Right Ear: External ear normal.      Left Ear: External ear normal.   Eyes:      Extraocular Movements: Extraocular movements intact.      Conjunctiva/sclera: Conjunctivae normal.      Pupils: Pupils are equal, round, and reactive to light.   Cardiovascular:      Rate and Rhythm: Normal rate and regular rhythm.      Pulses: Normal pulses.      Heart sounds: Normal heart sounds. No murmur heard.  Pulmonary:      Effort: Pulmonary effort is normal. No respiratory distress.      Breath sounds: No wheezing (mild; diffuse).   Abdominal:      General: Abdomen is flat. Bowel sounds are normal. There is no distension.      Palpations: Abdomen is soft.      Tenderness: There is no abdominal tenderness. There is no right CVA tenderness or left CVA tenderness.      Comments: No CVA tenderness   Musculoskeletal:         General: Normal range of motion.      Cervical back: Normal range of motion and neck supple.      Right lower leg: No edema.      Left lower leg: No edema.   Skin:     General: Skin is warm and dry.      Capillary Refill: Capillary refill takes less than 2 seconds.      Coloration: Skin is not jaundiced.   Neurological:      General: No focal deficit present.      Mental Status: He is alert and oriented to person, place, and time. Mental status is at baseline.      Cranial Nerves: No cranial nerve deficit.   Psychiatric:          Mood and Affect: Mood normal.         Behavior: Behavior normal.             Significant Labs: All pertinent labs within the past 24 hours have been reviewed.  Blood Culture:   Recent Labs   Lab 08/18/24  0825 08/18/24  0832   LABBLOO Gram stain francis bottle: Gram negative rods  Results called to and read back by: Roberta KOVACS 08/18/2024  22:07  Gram stain aer bottle: Gram negative rods  Positive results previously called 08/18/2024  22:12  GRAM NEGATIVE JEAN PAUL  Identification and susceptibility pending  * Gram stain francis bottle: Gram negative rods  Results called to and read back by: Roberta KOVACS 08/18/2024  22:11  Gram stain aer bottle: Gram negative rods  Positive results previously called 08/19/2024  08:43  GRAM NEGATIVE JEAN PAUL  Identification pending  For susceptibility see order #[id number]  *     CBC:   Recent Labs   Lab 08/18/24 0818 08/19/24  0521   WBC 22.76* 10.50   HGB 14.3 14.1   HCT 42.4 43.2    263     CMP:   Recent Labs   Lab 08/18/24 0818 08/19/24  0521    139   K 3.7 4.3    102   CO2 23 27   * 96   BUN 19 19   CREATININE 1.8* 1.5*   CALCIUM 9.7 9.3   PROT 7.3  --    ALBUMIN 3.2*  --    BILITOT 0.9  --    ALKPHOS 99  --    AST 20  --    ALT 13  --    ANIONGAP 13 10       Significant Imaging: I have reviewed all pertinent imaging results/findings within the past 24 hours.    Assessment/Plan:      * Sepsis due to urinary tract infection  Patient with 2/4 SIRS criteria on 8/18/2024 -  tachycardia and leukocytosis (WBC 23). Infectious work-up includes: CXR negative for consolidation COVID/RSV/flu negative no known skin lesions denies diarrhea. Possible sources include: Urinary Tract. UA with 3+ leuks, 3+ blood, >100 WBC, >100 RBCs. Blood cultures 8/18 positive 2/2 GNR bacteremia. Patient meets criteria for sepsis.  - continue ceftriaxone (8/18 -)   - f/u cultures    Antibiotics (72h ago, onward)      Start     Stop Route Frequency Ordered    08/20/24 0000  cefTRIAXone (ROCEPHIN)  2 g in D5W 100 mL IVPB (MB+)         -- IV Every 24 hours (non-standard times) 08/19/24 1054          Heart Rate: 98 (maximum value)     Gram-negative bacteremia  As above      Acute renal failure superimposed on chronic kidney disease  AMBROCIO is likely due to sepsis/UTI. Baseline creatinine is  1.2 . Most recent creatinine and eGFR are listed below.  Recent Labs     08/18/24  0818 08/19/24  0521   CREATININE 1.8* 1.5*   EGFRNORACEVR 41.3* 51.3*        Plan  - s/p 2L Ivf on admission   - Avoid nephrotoxins and renally dose meds for GFR listed above  - Monitor urine output, serial BMP, and adjust therapy as needed    COPD (chronic obstructive pulmonary disease)  Patient's COPD is controlled currently.  Patient is currently off COPD Pathway. Continue scheduled inhalers and monitor respiratory status closely.   - goal spO2 88%   - continue home inhalers  - duonebs prn    BPH (benign prostatic hyperplasia)  - continue home flomax        VTE Risk Mitigation (From admission, onward)           Ordered     heparin (porcine) injection 5,000 Units  Every 8 hours         08/19/24 1054     IP VTE LOW RISK PATIENT  Once         08/18/24 1116     Place sequential compression device  Until discontinued         08/18/24 1116                    Discharge Planning   KAPIL: 8/22/2024     Code Status: Full Code   Is the patient medically ready for discharge?:     Reason for patient still in hospital (select all that apply): Patient trending condition and Treatment  Discharge Plan A: Home                  Charisse Howell MD  Department of Hospital Medicine   [facility]

## 2024-08-19 NOTE — ASSESSMENT & PLAN NOTE
Patient with 2/4 SIRS criteria on 8/18/2024 -  tachycardia and leukocytosis (WBC 23). Infectious work-up includes: CXR negative for consolidation COVID/RSV/flu negative no known skin lesions denies diarrhea. Possible sources include: Urinary Tract. UA with 3+ leuks, 3+ blood, >100 WBC, >100 RBCs. Blood cultures 8/18 positive 2/2 GNR bacteremia. Patient meets criteria for sepsis.  - continue ceftriaxone (8/18 -)   - f/u cultures    Antibiotics (72h ago, onward)      Start     Stop Route Frequency Ordered    08/20/24 0000  cefTRIAXone (ROCEPHIN) 2 g in D5W 100 mL IVPB (MB+)         -- IV Every 24 hours (non-standard times) 08/19/24 1054          Heart Rate: 98 (maximum value)

## 2024-08-19 NOTE — ED NOTES
Bed: EDOU03  Expected date: 8/17/24  Expected time: 2:26 PM  Means of arrival:   Comments:  Cons 1

## 2024-08-19 NOTE — SUBJECTIVE & OBJECTIVE
Interval History: No acute events overnight. Patient has no acute complaints, still having some malaise but feels better than yesterday. Denies chest pain, SOB, n/v, c/d. No abdominal pain, difficulty urinating, or flank pain.       Review of Systems  Objective:     Vital Signs (Most Recent):  Temp: 99 °F (37.2 °C) (08/19/24 0740)  Pulse: 73 (08/19/24 1055)  Resp: 17 (08/19/24 0758)  BP: 105/61 (08/19/24 0740)  SpO2: 97 % (08/19/24 0758) Vital Signs (24h Range):  Temp:  [97.7 °F (36.5 °C)-99.1 °F (37.3 °C)] 99 °F (37.2 °C)  Pulse:  [] 73  Resp:  [15-20] 17  SpO2:  [92 %-100 %] 97 %  BP: (105-143)/(61-80) 105/61     Weight: 59.5 kg (131 lb 2.8 oz)  Body mass index is 22.52 kg/m².    Intake/Output Summary (Last 24 hours) at 8/19/2024 1217  Last data filed at 8/19/2024 0050  Gross per 24 hour   Intake 100 ml   Output --   Net 100 ml         Physical Exam  Vitals and nursing note reviewed.   Constitutional:       General: He is not in acute distress.     Appearance: Normal appearance. He is not ill-appearing.   HENT:      Head: Normocephalic and atraumatic.      Right Ear: External ear normal.      Left Ear: External ear normal.   Eyes:      Extraocular Movements: Extraocular movements intact.      Conjunctiva/sclera: Conjunctivae normal.      Pupils: Pupils are equal, round, and reactive to light.   Cardiovascular:      Rate and Rhythm: Normal rate and regular rhythm.      Pulses: Normal pulses.      Heart sounds: Normal heart sounds. No murmur heard.  Pulmonary:      Effort: Pulmonary effort is normal. No respiratory distress.      Breath sounds: No wheezing (mild; diffuse).   Abdominal:      General: Abdomen is flat. Bowel sounds are normal. There is no distension.      Palpations: Abdomen is soft.      Tenderness: There is no abdominal tenderness. There is no right CVA tenderness or left CVA tenderness.      Comments: No CVA tenderness   Musculoskeletal:         General: Normal range of motion.      Cervical  back: Normal range of motion and neck supple.      Right lower leg: No edema.      Left lower leg: No edema.   Skin:     General: Skin is warm and dry.      Capillary Refill: Capillary refill takes less than 2 seconds.      Coloration: Skin is not jaundiced.   Neurological:      General: No focal deficit present.      Mental Status: He is alert and oriented to person, place, and time. Mental status is at baseline.      Cranial Nerves: No cranial nerve deficit.   Psychiatric:         Mood and Affect: Mood normal.         Behavior: Behavior normal.             Significant Labs: All pertinent labs within the past 24 hours have been reviewed.  Blood Culture:   Recent Labs   Lab 08/18/24  0825 08/18/24  0832   LABBLOO Gram stain francis bottle: Gram negative rods  Results called to and read back by: Roberta KOVACS 08/18/2024  22:07  Gram stain aer bottle: Gram negative rods  Positive results previously called 08/18/2024  22:12  GRAM NEGATIVE JEAN PAUL  Identification and susceptibility pending  * Gram stain francis bottle: Gram negative rods  Results called to and read back by: Roberta KOVACS 08/18/2024  22:11  Gram stain aer bottle: Gram negative rods  Positive results previously called 08/19/2024  08:43  GRAM NEGATIVE JEAN PAUL  Identification pending  For susceptibility see order #[id number]  *     CBC:   Recent Labs   Lab 08/18/24 0818 08/19/24  0521   WBC 22.76* 10.50   HGB 14.3 14.1   HCT 42.4 43.2    263     CMP:   Recent Labs   Lab 08/18/24 0818 08/19/24  0521    139   K 3.7 4.3    102   CO2 23 27   * 96   BUN 19 19   CREATININE 1.8* 1.5*   CALCIUM 9.7 9.3   PROT 7.3  --    ALBUMIN 3.2*  --    BILITOT 0.9  --    ALKPHOS 99  --    AST 20  --    ALT 13  --    ANIONGAP 13 10       Significant Imaging: I have reviewed all pertinent imaging results/findings within the past 24 hours.

## 2024-08-19 NOTE — SIGNIFICANT EVENT
Notified by nursing that patient has positive blood cultures.     Admitted with Sepsis due to UTI     Pt on scheduled IV ceftriaxone     Plan  -AM surveillance blood cultures ordered  -continue current ceftriaxone.     Microbiology Results (last 7 days)       Procedure Component Value Units Date/Time    Blood culture x two cultures. Draw prior to antibiotics. [[id number]] Collected: 08/18/24 0825    Order Status: Completed Specimen: Blood from Peripheral, Antecubital, Right Updated: 08/18/24 2213     Blood Culture, Routine Gram stain francis bottle: Gram negative rods      Results called to and read back by: Roberta KOVACS 08/18/2024  22:07      Gram stain aer bottle: Gram negative rods      Positive results previously called 08/18/2024  22:12    Narrative:      Aerobic and anaerobic    Blood culture x two cultures. Draw prior to antibiotics. [[id number]] Collected: 08/18/24 0832    Order Status: Completed Specimen: Blood from Peripheral, Antecubital, Right Updated: 08/18/24 2211     Blood Culture, Routine Gram stain francis bottle: Gram negative rods      Results called to and read back by: Roberta KOVACS 08/18/2024  22:11    Narrative:      Aerobic and anaerobic    Rapid Organism ID by PCR (from Blood culture) [[id number]] Collected: 08/18/24 0832    Order Status: No result Updated: 08/18/24 2158    Urine culture [[id number]] Collected: 08/18/24 0927    Order Status: No result Specimen: Urine Updated: 08/18/24 1028

## 2024-08-20 LAB
ANION GAP SERPL CALC-SCNC: 9 MMOL/L (ref 8–16)
BACTERIA UR CULT: ABNORMAL
BASOPHILS # BLD AUTO: 0.02 K/UL (ref 0–0.2)
BASOPHILS NFR BLD: 0.3 % (ref 0–1.9)
BUN SERPL-MCNC: 15 MG/DL (ref 8–23)
BUN SERPL-MCNC: 19 MG/DL (ref 6–30)
CALCIUM SERPL-MCNC: 8.8 MG/DL (ref 8.7–10.5)
CHLORIDE SERPL-SCNC: 102 MMOL/L (ref 95–110)
CHLORIDE SERPL-SCNC: 103 MMOL/L (ref 95–110)
CO2 SERPL-SCNC: 24 MMOL/L (ref 23–29)
CREAT SERPL-MCNC: 1.4 MG/DL (ref 0.5–1.4)
CREAT SERPL-MCNC: 1.8 MG/DL (ref 0.5–1.4)
DIFFERENTIAL METHOD BLD: ABNORMAL
EOSINOPHIL # BLD AUTO: 0 K/UL (ref 0–0.5)
EOSINOPHIL NFR BLD: 0.3 % (ref 0–8)
ERYTHROCYTE [DISTWIDTH] IN BLOOD BY AUTOMATED COUNT: 13.4 % (ref 11.5–14.5)
EST. GFR  (NO RACE VARIABLE): 55.8 ML/MIN/1.73 M^2
GLUCOSE SERPL-MCNC: 126 MG/DL (ref 70–110)
GLUCOSE SERPL-MCNC: 141 MG/DL (ref 70–110)
HCT VFR BLD AUTO: 37.8 % (ref 40–54)
HCT VFR BLD CALC: 46 %PCV (ref 36–54)
HGB BLD-MCNC: 12.4 G/DL (ref 14–18)
IMM GRANULOCYTES # BLD AUTO: 0.02 K/UL (ref 0–0.04)
IMM GRANULOCYTES NFR BLD AUTO: 0.3 % (ref 0–0.5)
LYMPHOCYTES # BLD AUTO: 1.1 K/UL (ref 1–4.8)
LYMPHOCYTES NFR BLD: 16.3 % (ref 18–48)
MAGNESIUM SERPL-MCNC: 2 MG/DL (ref 1.6–2.6)
MCH RBC QN AUTO: 31.3 PG (ref 27–31)
MCHC RBC AUTO-ENTMCNC: 32.8 G/DL (ref 32–36)
MCV RBC AUTO: 96 FL (ref 82–98)
MONOCYTES # BLD AUTO: 0.8 K/UL (ref 0.3–1)
MONOCYTES NFR BLD: 12.7 % (ref 4–15)
NEUTROPHILS # BLD AUTO: 4.6 K/UL (ref 1.8–7.7)
NEUTROPHILS NFR BLD: 70.1 % (ref 38–73)
NRBC BLD-RTO: 0 /100 WBC
PLATELET # BLD AUTO: 245 K/UL (ref 150–450)
PMV BLD AUTO: 9.1 FL (ref 9.2–12.9)
POC IONIZED CALCIUM: 1.12 MMOL/L (ref 1.06–1.42)
POC TCO2 (MEASURED): 25 MMOL/L (ref 23–29)
POTASSIUM BLD-SCNC: 3.7 MMOL/L (ref 3.5–5.1)
POTASSIUM SERPL-SCNC: 3.7 MMOL/L (ref 3.5–5.1)
RBC # BLD AUTO: 3.96 M/UL (ref 4.6–6.2)
SAMPLE: ABNORMAL
SODIUM BLD-SCNC: 137 MMOL/L (ref 136–145)
SODIUM SERPL-SCNC: 136 MMOL/L (ref 136–145)
WBC # BLD AUTO: 6.55 K/UL (ref 3.9–12.7)

## 2024-08-20 PROCEDURE — 85025 COMPLETE CBC W/AUTO DIFF WBC: CPT | Performed by: PHYSICIAN ASSISTANT

## 2024-08-20 PROCEDURE — 83735 ASSAY OF MAGNESIUM: CPT | Performed by: PHYSICIAN ASSISTANT

## 2024-08-20 PROCEDURE — 21400001 HC TELEMETRY ROOM

## 2024-08-20 PROCEDURE — 25000003 PHARM REV CODE 250: Performed by: STUDENT IN AN ORGANIZED HEALTH CARE EDUCATION/TRAINING PROGRAM

## 2024-08-20 PROCEDURE — 94640 AIRWAY INHALATION TREATMENT: CPT

## 2024-08-20 PROCEDURE — 94761 N-INVAS EAR/PLS OXIMETRY MLT: CPT

## 2024-08-20 PROCEDURE — 36415 COLL VENOUS BLD VENIPUNCTURE: CPT | Performed by: PHYSICIAN ASSISTANT

## 2024-08-20 PROCEDURE — 25000003 PHARM REV CODE 250: Performed by: PHYSICIAN ASSISTANT

## 2024-08-20 PROCEDURE — 25000242 PHARM REV CODE 250 ALT 637 W/ HCPCS: Performed by: PHYSICIAN ASSISTANT

## 2024-08-20 PROCEDURE — 63600175 PHARM REV CODE 636 W HCPCS: Performed by: STUDENT IN AN ORGANIZED HEALTH CARE EDUCATION/TRAINING PROGRAM

## 2024-08-20 PROCEDURE — 80048 BASIC METABOLIC PNL TOTAL CA: CPT | Performed by: PHYSICIAN ASSISTANT

## 2024-08-20 RX ADMIN — HEPARIN SODIUM 5000 UNITS: 5000 INJECTION INTRAVENOUS; SUBCUTANEOUS at 05:08

## 2024-08-20 RX ADMIN — CEFTRIAXONE 2 G: 2 INJECTION, POWDER, FOR SOLUTION INTRAMUSCULAR; INTRAVENOUS at 12:08

## 2024-08-20 RX ADMIN — FLUTICASONE FUROATE AND VILANTEROL TRIFENATATE 1 PUFF: 100; 25 POWDER RESPIRATORY (INHALATION) at 10:08

## 2024-08-20 RX ADMIN — ACETAMINOPHEN 650 MG: 325 TABLET ORAL at 01:08

## 2024-08-20 RX ADMIN — ACETAMINOPHEN 650 MG: 325 TABLET ORAL at 12:08

## 2024-08-20 RX ADMIN — HEPARIN SODIUM 5000 UNITS: 5000 INJECTION INTRAVENOUS; SUBCUTANEOUS at 10:08

## 2024-08-20 RX ADMIN — TAMSULOSIN HYDROCHLORIDE 0.4 MG: 0.4 CAPSULE ORAL at 07:08

## 2024-08-20 RX ADMIN — HEPARIN SODIUM 5000 UNITS: 5000 INJECTION INTRAVENOUS; SUBCUTANEOUS at 01:08

## 2024-08-20 RX ADMIN — PANTOPRAZOLE SODIUM 40 MG: 40 TABLET, DELAYED RELEASE ORAL at 07:08

## 2024-08-20 NOTE — PLAN OF CARE
Problem: Adult Inpatient Plan of Care  Goal: Plan of Care Review  8/20/2024 0628 by Isabell Ling RN  Outcome: Progressing  8/20/2024 0323 by Isabell Ling RN  Outcome: Progressing  Goal: Patient-Specific Goal (Individualized)  8/20/2024 0628 by Isabell Ling RN  Outcome: Progressing  8/20/2024 0323 by Isabell Ling RN  Outcome: Progressing  Goal: Absence of Hospital-Acquired Illness or Injury  8/20/2024 0628 by Isabell Ling RN  Outcome: Progressing  8/20/2024 0323 by Isabell Ling RN  Outcome: Progressing  Intervention: Identify and Manage Fall Risk  Flowsheets (Taken 8/20/2024 0323)  Safety Promotion/Fall Prevention:   assistive device/personal item within reach   side rails raised x 2  Intervention: Prevent and Manage VTE (Venous Thromboembolism) Risk  Flowsheets (Taken 8/20/2024 0323)  VTE Prevention/Management:   ROM (passive) performed   ROM (active) performed   ambulation promoted  Goal: Optimal Comfort and Wellbeing  8/20/2024 0628 by Isabell Ling RN  Outcome: Progressing  8/20/2024 0323 by Isabell Ling RN  Outcome: Progressing  Goal: Readiness for Transition of Care  8/20/2024 0628 by Isabell Ling RN  Outcome: Progressing  8/20/2024 0323 by Isabell Ling RN  Outcome: Progressing     Problem: Sepsis/Septic Shock  Goal: Optimal Coping  8/20/2024 0628 by Isabell Ling RN  Outcome: Progressing  8/20/2024 0323 by Isabell Ling RN  Outcome: Progressing  Intervention: Optimize Psychosocial Adjustment to Illness  Flowsheets (Taken 8/20/2024 0323)  Supportive Measures: active listening utilized  Family/Support System Care: self-care encouraged  Goal: Absence of Bleeding  8/20/2024 0628 by Isabell Ling RN  Outcome: Progressing  8/20/2024 0323 by Isabell Ling RN  Outcome: Progressing  Intervention: Monitor and Manage Bleeding  Flowsheets (Taken 8/20/2024 0323)  Bleeding Precautions: blood pressure closely monitored  Goal: Blood Glucose Level Within Targeted  Range  8/20/2024 0628 by Isabell Ling RN  Outcome: Progressing  8/20/2024 0323 by Isabell Ling RN  Outcome: Progressing  Goal: Absence of Infection Signs and Symptoms  8/20/2024 0628 by Isabell Ling RN  Outcome: Progressing  8/20/2024 0323 by Isabell Ling RN  Outcome: Progressing  Goal: Optimal Nutrition Intake  8/20/2024 0628 by Isabell Ling RN  Outcome: Progressing  8/20/2024 0323 by Isabell Ling RN  Outcome: Progressing     Problem: Acute Kidney Injury/Impairment  Goal: Fluid and Electrolyte Balance  8/20/2024 0628 by Isabell Ling RN  Outcome: Progressing  8/20/2024 0323 by Isabell Ling RN  Outcome: Progressing  Intervention: Monitor and Manage Fluid and Electrolyte Balance  Flowsheets (Taken 8/20/2024 0323)  Fluid/Electrolyte Management: intravenous fluids adjusted  Goal: Improved Oral Intake  8/20/2024 0628 by Isabell Ling RN  Outcome: Progressing  8/20/2024 0323 by Isabell Ling RN  Outcome: Progressing  Goal: Effective Renal Function  8/20/2024 0628 by Isabell Ling RN  Outcome: Progressing  8/20/2024 0323 by Isabell Ling RN  Outcome: Progressing

## 2024-08-20 NOTE — SUBJECTIVE & OBJECTIVE
Interval History: No acute events overnight. Patient starting to feel better today. Denies chest pain, SOB, n/v, c/d.     Review of Systems  Objective:     Vital Signs (Most Recent):  Temp: 99.2 °F (37.3 °C) (08/20/24 1116)  Pulse: 88 (08/20/24 1116)  Resp: 16 (08/20/24 1116)  BP: 104/65 (08/20/24 1116)  SpO2: 98 % (08/20/24 1116) Vital Signs (24h Range):  Temp:  [98.1 °F (36.7 °C)-101 °F (38.3 °C)] 99.2 °F (37.3 °C)  Pulse:  [] 88  Resp:  [16-18] 16  SpO2:  [92 %-98 %] 98 %  BP: (104-149)/(65-81) 104/65     Weight: 59.5 kg (131 lb 2.8 oz)  Body mass index is 22.52 kg/m².    Intake/Output Summary (Last 24 hours) at 8/20/2024 1210  Last data filed at 8/20/2024 1043  Gross per 24 hour   Intake 600 ml   Output 626 ml   Net -26 ml         Physical Exam  Vitals and nursing note reviewed.   Constitutional:       General: He is not in acute distress.     Appearance: Normal appearance. He is not ill-appearing.   HENT:      Head: Normocephalic and atraumatic.      Right Ear: External ear normal.      Left Ear: External ear normal.   Eyes:      Extraocular Movements: Extraocular movements intact.      Conjunctiva/sclera: Conjunctivae normal.      Pupils: Pupils are equal, round, and reactive to light.   Cardiovascular:      Rate and Rhythm: Normal rate and regular rhythm.      Pulses: Normal pulses.      Heart sounds: Normal heart sounds. No murmur heard.  Pulmonary:      Effort: Pulmonary effort is normal. No respiratory distress.      Breath sounds: No wheezing (mild; diffuse).   Abdominal:      General: Abdomen is flat. Bowel sounds are normal. There is no distension.      Palpations: Abdomen is soft.      Tenderness: There is no abdominal tenderness. There is no right CVA tenderness or left CVA tenderness.      Comments: No CVA tenderness   Musculoskeletal:         General: Normal range of motion.      Cervical back: Normal range of motion and neck supple.      Right lower leg: No edema.      Left lower leg: No  edema.   Skin:     General: Skin is warm and dry.      Capillary Refill: Capillary refill takes less than 2 seconds.      Coloration: Skin is not jaundiced.   Neurological:      General: No focal deficit present.      Mental Status: He is alert and oriented to person, place, and time. Mental status is at baseline.      Cranial Nerves: No cranial nerve deficit.   Psychiatric:         Mood and Affect: Mood normal.         Behavior: Behavior normal.             Significant Labs: All pertinent labs within the past 24 hours have been reviewed.  Blood Culture:   Recent Labs   Lab 08/19/24  0521 08/19/24  1122   LABBLOO No Growth to date  No Growth to date No Growth to date     CBC:   Recent Labs   Lab 08/19/24  0521 08/20/24  0550   WBC 10.50 6.55   HGB 14.1 12.4*   HCT 43.2 37.8*    245     CMP:   Recent Labs   Lab 08/19/24  0521 08/20/24  0550    136   K 4.3 3.7    103   CO2 27 24   GLU 96 126*   BUN 19 15   CREATININE 1.5* 1.4   CALCIUM 9.3 8.8   ANIONGAP 10 9       Significant Imaging: I have reviewed all pertinent imaging results/findings within the past 24 hours.

## 2024-08-20 NOTE — PROGRESS NOTES
[facility] Medicine  Progress Note    Patient Name: Darshan Hoskins  MRN: [id number]  Patient Class: IP- Inpatient   Admission Date: 8/18/2024  Length of Stay: 2 days  Attending Physician: Charisse Howell MD  Primary Care Provider: Suzette Madden Of        Subjective:     Principal Problem:Sepsis due to urinary tract infection        HPI:  Darshan Hoskins is a 63 y.o. male with PMHx significant for BPH and CKD admitted to hospital medicine for sepsis 2/2 UTI. Patient reports dizziness/fatigue, emesis and subjective fever for the past 2 days. Also endorses noting cloudy, red urine today, which prompted his visit to the ED. Denies CP, SOB, abdominal pain, back pain, changes in BMs, urinary symptoms. Of note, patient was admitted to [facility] in Sept of 2022 for sepsis 2/2 pan sensitive E. Coli UTI. States his symptoms today feel similar to previous UTI admission.    Upon arrival to the ED, patient was tachy to 133 with /63, temp 99.9, and SpO2 94%% on RA. Labs remarkable for WBC 22.76, Cr 1.8 (baseline 1.2), , troponin 0.019. UA with 1+ protein, 3+ blood, + nitrites, 3+ leuks, >100 RBCs, >100 WBCs. EKG with sinus tach to 113. CXR with no acute process. Given 1L IVF and CTX x1.     Overview/Hospital Course:  Patient admitted for sepsis secondary to UTI. Blood cultures drawn on admission quickly positive for GNR - sensitivity pending. Repeat blood cultures collected. Continuing ceftriaxone.     Interval History: No acute events overnight. Patient starting to feel better today. Denies chest pain, SOB, n/v, c/d.     Review of Systems  Objective:     Vital Signs (Most Recent):  Temp: 99.2 °F (37.3 °C) (08/20/24 1116)  Pulse: 88 (08/20/24 1116)  Resp: 16 (08/20/24 1116)  BP: 104/65 (08/20/24 1116)  SpO2: 98 % (08/20/24 1116) Vital Signs (24h Range):  Temp:  [98.1 °F (36.7 °C)-101 °F (38.3 °C)] 99.2 °F (37.3 °C)  Pulse:  [] 88  Resp:  [16-18] 16  SpO2:  [92 %-98 %] 98 %  BP:  (104-149)/(65-81) 104/65     Weight: 59.5 kg (131 lb 2.8 oz)  Body mass index is 22.52 kg/m².    Intake/Output Summary (Last 24 hours) at 8/20/2024 1210  Last data filed at 8/20/2024 1043  Gross per 24 hour   Intake 600 ml   Output 626 ml   Net -26 ml         Physical Exam  Vitals and nursing note reviewed.   Constitutional:       General: He is not in acute distress.     Appearance: Normal appearance. He is not ill-appearing.   HENT:      Head: Normocephalic and atraumatic.      Right Ear: External ear normal.      Left Ear: External ear normal.   Eyes:      Extraocular Movements: Extraocular movements intact.      Conjunctiva/sclera: Conjunctivae normal.      Pupils: Pupils are equal, round, and reactive to light.   Cardiovascular:      Rate and Rhythm: Normal rate and regular rhythm.      Pulses: Normal pulses.      Heart sounds: Normal heart sounds. No murmur heard.  Pulmonary:      Effort: Pulmonary effort is normal. No respiratory distress.      Breath sounds: No wheezing (mild; diffuse).   Abdominal:      General: Abdomen is flat. Bowel sounds are normal. There is no distension.      Palpations: Abdomen is soft.      Tenderness: There is no abdominal tenderness. There is no right CVA tenderness or left CVA tenderness.      Comments: No CVA tenderness   Musculoskeletal:         General: Normal range of motion.      Cervical back: Normal range of motion and neck supple.      Right lower leg: No edema.      Left lower leg: No edema.   Skin:     General: Skin is warm and dry.      Capillary Refill: Capillary refill takes less than 2 seconds.      Coloration: Skin is not jaundiced.   Neurological:      General: No focal deficit present.      Mental Status: He is alert and oriented to person, place, and time. Mental status is at baseline.      Cranial Nerves: No cranial nerve deficit.   Psychiatric:         Mood and Affect: Mood normal.         Behavior: Behavior normal.             Significant Labs: All pertinent  labs within the past 24 hours have been reviewed.  Blood Culture:   Recent Labs   Lab 08/19/24  0521 08/19/24  1122   LABBLOO No Growth to date  No Growth to date No Growth to date     CBC:   Recent Labs   Lab 08/19/24  0521 08/20/24  0550   WBC 10.50 6.55   HGB 14.1 12.4*   HCT 43.2 37.8*    245     CMP:   Recent Labs   Lab 08/19/24  0521 08/20/24  0550    136   K 4.3 3.7    103   CO2 27 24   GLU 96 126*   BUN 19 15   CREATININE 1.5* 1.4   CALCIUM 9.3 8.8   ANIONGAP 10 9       Significant Imaging: I have reviewed all pertinent imaging results/findings within the past 24 hours.    Assessment/Plan:      * Sepsis due to urinary tract infection  Patient with 2/4 SIRS criteria on 8/18/2024 -  tachycardia and leukocytosis (WBC 23). Infectious work-up includes: CXR negative for consolidation COVID/RSV/flu negative no known skin lesions denies diarrhea. Possible sources include: Urinary Tract. UA with 3+ leuks, 3+ blood, >100 WBC, >100 RBCs. Blood cultures 8/18 positive 2/2 GNR bacteremia. Patient meets criteria for sepsis.  - continue ceftriaxone (8/18 -)   - f/u cultures    Antibiotics (72h ago, onward)      Start     Stop Route Frequency Ordered    08/20/24 0000  cefTRIAXone (ROCEPHIN) 2 g in D5W 100 mL IVPB (MB+)         -- IV Every 24 hours (non-standard times) 08/19/24 1054               Gram-negative bacteremia  As above      Acute renal failure superimposed on chronic kidney disease  AMBROCIO is likely due to sepsis/UTI. Baseline creatinine is  1.2 . Most recent creatinine and eGFR are listed below.  Recent Labs     08/18/24  0818 08/19/24  0521 08/20/24  0550   CREATININE 1.8* 1.5* 1.4   EGFRNORACEVR 41.3* 51.3* 55.8*        Plan  - s/p 2L Ivf on admission   - Avoid nephrotoxins and renally dose meds for GFR listed above  - Monitor urine output, serial BMP, and adjust therapy as needed    COPD (chronic obstructive pulmonary disease)  Patient's COPD is controlled currently.  Patient is currently off  COPD Pathway. Continue scheduled inhalers and monitor respiratory status closely.   - goal spO2 88%   - continue home inhalers  - duonebs prn    BPH (benign prostatic hyperplasia)  - continue home flomax        VTE Risk Mitigation (From admission, onward)           Ordered     heparin (porcine) injection 5,000 Units  Every 8 hours         08/19/24 1054     IP VTE LOW RISK PATIENT  Once         08/18/24 1116     Place sequential compression device  Until discontinued         08/18/24 1116                    Discharge Planning   KAPIL: 8/22/2024     Code Status: Full Code   Is the patient medically ready for discharge?:     Reason for patient still in hospital (select all that apply): Patient trending condition and Laboratory test  Discharge Plan A: Home                  Charisse Howell MD  Department of Hospital Medicine   [facility] - Med Surg

## 2024-08-20 NOTE — NURSING
Entered room patient observed standing urinating in urinal. Blood noted on pat. Bed sheet. Iv  right forearm out. Catheter intact. Denies pain.

## 2024-08-20 NOTE — PLAN OF CARE
AAOX4, bed in low position, bed locked, side rails up x 2, and call light within reach.    Problem: Adult Inpatient Plan of Care  Goal: Plan of Care Review  Outcome: Progressing  Goal: Patient-Specific Goal (Individualized)  Outcome: Progressing  Goal: Absence of Hospital-Acquired Illness or Injury  Outcome: Progressing  Goal: Optimal Comfort and Wellbeing  Outcome: Progressing  Goal: Readiness for Transition of Care  Outcome: Progressing     Problem: Sepsis/Septic Shock  Goal: Optimal Coping  Outcome: Progressing  Goal: Absence of Bleeding  Outcome: Progressing  Goal: Blood Glucose Level Within Targeted Range  Outcome: Progressing  Goal: Absence of Infection Signs and Symptoms  Outcome: Progressing  Goal: Optimal Nutrition Intake  Outcome: Progressing     Problem: Acute Kidney Injury/Impairment  Goal: Fluid and Electrolyte Balance  Outcome: Progressing  Goal: Improved Oral Intake  Outcome: Progressing  Goal: Effective Renal Function  Outcome: Progressing

## 2024-08-20 NOTE — ASSESSMENT & PLAN NOTE
AMBROCIO is likely due to sepsis/UTI. Baseline creatinine is  1.2 . Most recent creatinine and eGFR are listed below.  Recent Labs     08/18/24  0818 08/19/24  0521 08/20/24  0550   CREATININE 1.8* 1.5* 1.4   EGFRNORACEVR 41.3* 51.3* 55.8*        Plan  - s/p 2L Ivf on admission   - Avoid nephrotoxins and renally dose meds for GFR listed above  - Monitor urine output, serial BMP, and adjust therapy as needed

## 2024-08-20 NOTE — PLAN OF CARE
Problem: Adult Inpatient Plan of Care  Goal: Plan of Care Review  Outcome: Progressing  Goal: Patient-Specific Goal (Individualized)  Outcome: Progressing  Goal: Absence of Hospital-Acquired Illness or Injury  Outcome: Progressing  Intervention: Identify and Manage Fall Risk  Flowsheets (Taken 8/20/2024 0323)  Safety Promotion/Fall Prevention:   assistive device/personal item within reach   side rails raised x 2  Intervention: Prevent and Manage VTE (Venous Thromboembolism) Risk  Flowsheets (Taken 8/20/2024 0323)  VTE Prevention/Management:   ROM (passive) performed   ROM (active) performed   ambulation promoted  Goal: Optimal Comfort and Wellbeing  Outcome: Progressing  Goal: Readiness for Transition of Care  Outcome: Progressing     Problem: Sepsis/Septic Shock  Goal: Optimal Coping  Outcome: Progressing  Intervention: Optimize Psychosocial Adjustment to Illness  Flowsheets (Taken 8/20/2024 0323)  Supportive Measures: active listening utilized  Family/Support System Care: self-care encouraged  Goal: Absence of Bleeding  Outcome: Progressing  Intervention: Monitor and Manage Bleeding  Flowsheets (Taken 8/20/2024 0323)  Bleeding Precautions: blood pressure closely monitored  Goal: Blood Glucose Level Within Targeted Range  Outcome: Progressing  Goal: Absence of Infection Signs and Symptoms  Outcome: Progressing  Goal: Optimal Nutrition Intake  Outcome: Progressing     Problem: Acute Kidney Injury/Impairment  Goal: Fluid and Electrolyte Balance  Outcome: Progressing  Intervention: Monitor and Manage Fluid and Electrolyte Balance  Flowsheets (Taken 8/20/2024 0323)  Fluid/Electrolyte Management: intravenous fluids adjusted  Goal: Improved Oral Intake  Outcome: Progressing  Goal: Effective Renal Function  Outcome: Progressing

## 2024-08-20 NOTE — ASSESSMENT & PLAN NOTE
Patient with 2/4 SIRS criteria on 8/18/2024 -  tachycardia and leukocytosis (WBC 23). Infectious work-up includes: CXR negative for consolidation COVID/RSV/flu negative no known skin lesions denies diarrhea. Possible sources include: Urinary Tract. UA with 3+ leuks, 3+ blood, >100 WBC, >100 RBCs. Blood cultures 8/18 positive 2/2 GNR bacteremia. Patient meets criteria for sepsis.  - continue ceftriaxone (8/18 -)   - f/u cultures    Antibiotics (72h ago, onward)    Start     Stop Route Frequency Ordered    08/20/24 0000  cefTRIAXone (ROCEPHIN) 2 g in D5W 100 mL IVPB (MB+)         -- IV Every 24 hours (non-standard times) 08/19/24 1054

## 2024-08-21 PROBLEM — B96.20 E COLI BACTEREMIA: Status: ACTIVE | Noted: 2024-08-18

## 2024-08-21 LAB
ANION GAP SERPL CALC-SCNC: 8 MMOL/L (ref 8–16)
BACTERIA BLD CULT: ABNORMAL
BASOPHILS # BLD AUTO: 0.01 K/UL (ref 0–0.2)
BASOPHILS NFR BLD: 0.1 % (ref 0–1.9)
BUN SERPL-MCNC: 14 MG/DL (ref 8–23)
CALCIUM SERPL-MCNC: 8.8 MG/DL (ref 8.7–10.5)
CHLORIDE SERPL-SCNC: 104 MMOL/L (ref 95–110)
CO2 SERPL-SCNC: 26 MMOL/L (ref 23–29)
CREAT SERPL-MCNC: 1.4 MG/DL (ref 0.5–1.4)
DIFFERENTIAL METHOD BLD: ABNORMAL
EOSINOPHIL # BLD AUTO: 0.1 K/UL (ref 0–0.5)
EOSINOPHIL NFR BLD: 0.9 % (ref 0–8)
ERYTHROCYTE [DISTWIDTH] IN BLOOD BY AUTOMATED COUNT: 13.6 % (ref 11.5–14.5)
EST. GFR  (NO RACE VARIABLE): 55.8 ML/MIN/1.73 M^2
GLUCOSE SERPL-MCNC: 135 MG/DL (ref 70–110)
HCT VFR BLD AUTO: 36.4 % (ref 40–54)
HGB BLD-MCNC: 12.1 G/DL (ref 14–18)
IMM GRANULOCYTES # BLD AUTO: 0.03 K/UL (ref 0–0.04)
IMM GRANULOCYTES NFR BLD AUTO: 0.4 % (ref 0–0.5)
LYMPHOCYTES # BLD AUTO: 1.1 K/UL (ref 1–4.8)
LYMPHOCYTES NFR BLD: 16.4 % (ref 18–48)
MAGNESIUM SERPL-MCNC: 1.8 MG/DL (ref 1.6–2.6)
MCH RBC QN AUTO: 31.6 PG (ref 27–31)
MCHC RBC AUTO-ENTMCNC: 33.2 G/DL (ref 32–36)
MCV RBC AUTO: 95 FL (ref 82–98)
MONOCYTES # BLD AUTO: 0.9 K/UL (ref 0.3–1)
MONOCYTES NFR BLD: 12.5 % (ref 4–15)
NEUTROPHILS # BLD AUTO: 4.8 K/UL (ref 1.8–7.7)
NEUTROPHILS NFR BLD: 69.7 % (ref 38–73)
NRBC BLD-RTO: 0 /100 WBC
PLATELET # BLD AUTO: 259 K/UL (ref 150–450)
PMV BLD AUTO: 9.9 FL (ref 9.2–12.9)
POTASSIUM SERPL-SCNC: 3.7 MMOL/L (ref 3.5–5.1)
RBC # BLD AUTO: 3.83 M/UL (ref 4.6–6.2)
SODIUM SERPL-SCNC: 138 MMOL/L (ref 136–145)
WBC # BLD AUTO: 6.9 K/UL (ref 3.9–12.7)

## 2024-08-21 PROCEDURE — 63600175 PHARM REV CODE 636 W HCPCS: Performed by: STUDENT IN AN ORGANIZED HEALTH CARE EDUCATION/TRAINING PROGRAM

## 2024-08-21 PROCEDURE — 25000242 PHARM REV CODE 250 ALT 637 W/ HCPCS: Performed by: PHYSICIAN ASSISTANT

## 2024-08-21 PROCEDURE — 83735 ASSAY OF MAGNESIUM: CPT | Performed by: PHYSICIAN ASSISTANT

## 2024-08-21 PROCEDURE — 80048 BASIC METABOLIC PNL TOTAL CA: CPT | Performed by: PHYSICIAN ASSISTANT

## 2024-08-21 PROCEDURE — 21400001 HC TELEMETRY ROOM

## 2024-08-21 PROCEDURE — 25000003 PHARM REV CODE 250: Performed by: STUDENT IN AN ORGANIZED HEALTH CARE EDUCATION/TRAINING PROGRAM

## 2024-08-21 PROCEDURE — 36415 COLL VENOUS BLD VENIPUNCTURE: CPT | Performed by: PHYSICIAN ASSISTANT

## 2024-08-21 PROCEDURE — 25000003 PHARM REV CODE 250: Performed by: PHYSICIAN ASSISTANT

## 2024-08-21 PROCEDURE — 85025 COMPLETE CBC W/AUTO DIFF WBC: CPT | Performed by: PHYSICIAN ASSISTANT

## 2024-08-21 RX ADMIN — HEPARIN SODIUM 5000 UNITS: 5000 INJECTION INTRAVENOUS; SUBCUTANEOUS at 10:08

## 2024-08-21 RX ADMIN — CEFTRIAXONE 2 G: 2 INJECTION, POWDER, FOR SOLUTION INTRAMUSCULAR; INTRAVENOUS at 11:08

## 2024-08-21 RX ADMIN — PANTOPRAZOLE SODIUM 40 MG: 40 TABLET, DELAYED RELEASE ORAL at 08:08

## 2024-08-21 RX ADMIN — ACETAMINOPHEN 650 MG: 325 TABLET ORAL at 05:08

## 2024-08-21 RX ADMIN — ACETAMINOPHEN 650 MG: 325 TABLET ORAL at 10:08

## 2024-08-21 RX ADMIN — TAMSULOSIN HYDROCHLORIDE 0.4 MG: 0.4 CAPSULE ORAL at 08:08

## 2024-08-21 RX ADMIN — HEPARIN SODIUM 5000 UNITS: 5000 INJECTION INTRAVENOUS; SUBCUTANEOUS at 05:08

## 2024-08-21 RX ADMIN — HEPARIN SODIUM 5000 UNITS: 5000 INJECTION INTRAVENOUS; SUBCUTANEOUS at 02:08

## 2024-08-21 RX ADMIN — SODIUM CHLORIDE, POTASSIUM CHLORIDE, SODIUM LACTATE AND CALCIUM CHLORIDE 500 ML: 600; 310; 30; 20 INJECTION, SOLUTION INTRAVENOUS at 02:08

## 2024-08-21 RX ADMIN — FLUTICASONE FUROATE AND VILANTEROL TRIFENATATE 1 PUFF: 100; 25 POWDER RESPIRATORY (INHALATION) at 08:08

## 2024-08-21 RX ADMIN — ACETAMINOPHEN 650 MG: 325 TABLET ORAL at 12:08

## 2024-08-21 RX ADMIN — CEFTRIAXONE 2 G: 2 INJECTION, POWDER, FOR SOLUTION INTRAMUSCULAR; INTRAVENOUS at 12:08

## 2024-08-21 NOTE — PLAN OF CARE
Problem: Adult Inpatient Plan of Care  Goal: Plan of Care Review  Outcome: Progressing  Goal: Patient-Specific Goal (Individualized)  Outcome: Progressing  Goal: Absence of Hospital-Acquired Illness or Injury  Outcome: Progressing  Goal: Optimal Comfort and Wellbeing  Outcome: Progressing  Goal: Readiness for Transition of Care  Outcome: Progressing     Problem: Sepsis/Septic Shock  Goal: Optimal Coping  Outcome: Progressing  Intervention: Optimize Psychosocial Adjustment to Illness  Flowsheets (Taken 8/21/2024 0458)  Supportive Measures:   active listening utilized   self-care encouraged   self-reflection promoted  Family/Support System Care: self-care encouraged  Goal: Absence of Bleeding  Outcome: Progressing  Goal: Blood Glucose Level Within Targeted Range  Outcome: Progressing  Goal: Absence of Infection Signs and Symptoms  Outcome: Progressing  Intervention: Promote Recovery  Flowsheets (Taken 8/21/2024 0458)  Sleep/Rest Enhancement:   regular sleep/rest pattern promoted   relaxation techniques promoted  Activity Management:   Ambulated in room - L4   Ambulated to bathroom - L4   Walking in place - L3  Goal: Optimal Nutrition Intake  Outcome: Progressing     Problem: Acute Kidney Injury/Impairment  Goal: Fluid and Electrolyte Balance  Outcome: Progressing  Goal: Improved Oral Intake  Outcome: Progressing  Intervention: Promote and Optimize Oral Intake  Flowsheets (Taken 8/21/2024 0458)  Oral Nutrition Promotion: rest periods promoted  Goal: Effective Renal Function  Outcome: Progressing

## 2024-08-21 NOTE — PLAN OF CARE
Patient had 500ml LR bolus and reported one large bowel movement today after breakfast. No other issues during day shift.   Problem: Acute Kidney Injury/Impairment  Goal: Fluid and Electrolyte Balance  Outcome: Progressing  Intervention: Monitor and Manage Fluid and Electrolyte Balance  Flowsheets (Taken 8/21/2024 1847)  Fluid/Electrolyte Management: intravenous fluid replacement initiated     Problem: Acute Kidney Injury/Impairment  Goal: Fluid and Electrolyte Balance  Intervention: Monitor and Manage Fluid and Electrolyte Balance  Flowsheets (Taken 8/21/2024 1847)  Fluid/Electrolyte Management: intravenous fluid replacement initiated     Problem: Acute Kidney Injury/Impairment  Goal: Improved Oral Intake  Intervention: Promote and Optimize Oral Intake  Flowsheets (Taken 8/21/2024 1847)  Nutrition Interventions: meal set-up provided

## 2024-08-21 NOTE — PLAN OF CARE
[facility] Med Surg  Discharge Reassessment    Primary Care Provider: Suzette Madden Of    Expected Discharge Date: 8/22/2024    Pt not medically ready for d/c.  Pt has a fever and on IV Abx.  SW met with pt to discuss discharge needs.  Pt ambulatory and independent with ADLs.  Pt has transportation home at the time of discharge.  No needs identified at this time.     Discharge Plan A and Plan B have been determined by review of patient's clinical status, future medical and therapeutic needs, and coverage/benefits for post-acute care in coordination with multidisciplinary team members.    Reassessment (most recent)       Discharge Reassessment - 08/21/24 1045          Discharge Reassessment    Assessment Type Discharge Planning Reassessment     Did the patient's condition or plan change since previous assessment? No     Discharge Plan discussed with: Patient     Communicated KAPIL with patient/caregiver Yes     Discharge Plan A Home     Discharge Plan B Home;Home with family     DME Needed Upon Discharge  none     Transition of Care Barriers None     Why the patient remains in the hospital Requires continued medical care        Post-Acute Status    Post-Acute Authorization Other     Coverage [employer] Managed Medicare     Other Status No Post-Acute Service Needs     Discharge Delays None known at this time                   Morena Nicolas LMSW  Part-Time-  [facility]  Ext. [phone]

## 2024-08-21 NOTE — SUBJECTIVE & OBJECTIVE
Interval History: Patient feeling well this morning but did have high fever to 102.8 overnight. Denies chest pain, SOB, n/v, c/d.       Review of Systems  Objective:     Vital Signs (Most Recent):  Temp: 97.9 °F (36.6 °C) (08/21/24 0805)  Pulse: 67 (08/21/24 0831)  Resp: 16 (08/21/24 0831)  BP: (!) 96/58 (08/21/24 0805)  SpO2: (!) 94 % (08/21/24 0805) Vital Signs (24h Range):  Temp:  [97.8 °F (36.6 °C)-102.8 °F (39.3 °C)] 97.9 °F (36.6 °C)  Pulse:  [] 67  Resp:  [16-19] 16  SpO2:  [94 %-96 %] 94 %  BP: ()/(58-77) 96/58     Weight: 59.5 kg (131 lb 2.8 oz)  Body mass index is 22.52 kg/m².    Intake/Output Summary (Last 24 hours) at 8/21/2024 1201  Last data filed at 8/21/2024 0900  Gross per 24 hour   Intake 300 ml   Output 175 ml   Net 125 ml         Physical Exam  Vitals and nursing note reviewed.   Constitutional:       General: He is not in acute distress.     Appearance: Normal appearance. He is not ill-appearing.   HENT:      Head: Normocephalic and atraumatic.      Right Ear: External ear normal.      Left Ear: External ear normal.   Eyes:      Extraocular Movements: Extraocular movements intact.      Conjunctiva/sclera: Conjunctivae normal.      Pupils: Pupils are equal, round, and reactive to light.   Cardiovascular:      Rate and Rhythm: Normal rate and regular rhythm.      Pulses: Normal pulses.      Heart sounds: Normal heart sounds. No murmur heard.  Pulmonary:      Effort: Pulmonary effort is normal. No respiratory distress.      Breath sounds: No wheezing or rales.   Abdominal:      General: Abdomen is flat. Bowel sounds are normal. There is no distension.      Palpations: Abdomen is soft.      Tenderness: There is no abdominal tenderness. There is no right CVA tenderness or left CVA tenderness.      Comments: No CVA tenderness   Musculoskeletal:         General: Normal range of motion.      Cervical back: Normal range of motion and neck supple.      Right lower leg: No edema.      Left  lower leg: No edema.   Skin:     General: Skin is warm and dry.      Capillary Refill: Capillary refill takes less than 2 seconds.      Coloration: Skin is not jaundiced.   Neurological:      General: No focal deficit present.      Mental Status: He is alert and oriented to person, place, and time. Mental status is at baseline.      Cranial Nerves: No cranial nerve deficit.   Psychiatric:         Mood and Affect: Mood normal.         Behavior: Behavior normal.             Significant Labs: All pertinent labs within the past 24 hours have been reviewed.    CBC:   Recent Labs   Lab 08/20/24  0550 08/21/24  0219   WBC 6.55 6.90   HGB 12.4* 12.1*   HCT 37.8* 36.4*    259     CMP:   Recent Labs   Lab 08/20/24  0550 08/21/24  0219    138   K 3.7 3.7    104   CO2 24 26   * 135*   BUN 15 14   CREATININE 1.4 1.4   CALCIUM 8.8 8.8   ANIONGAP 9 8       Significant Imaging: I have reviewed all pertinent imaging results/findings within the past 24 hours.

## 2024-08-21 NOTE — PROGRESS NOTES
[facility]  Progress Note    Patient Name: Darshan Hoskins  MRN: [id number]  Patient Class: IP- Inpatient   Admission Date: 8/18/2024  Length of Stay: 3 days  Attending Physician: Charisse Howell MD  Primary Care Provider: Suzette Madden Of        Subjective:     Principal Problem:Sepsis due to urinary tract infection        HPI:  Darshan Hoskins is a 63 y.o. male with PMHx significant for BPH and CKD admitted to hospital medicine for sepsis 2/2 UTI. Patient reports dizziness/fatigue, emesis and subjective fever for the past 2 days. Also endorses noting cloudy, red urine today, which prompted his visit to the ED. Denies CP, SOB, abdominal pain, back pain, changes in BMs, urinary symptoms. Of note, patient was admitted to [facility] in Sept of 2022 for sepsis 2/2 pan sensitive E. Coli UTI. States his symptoms today feel similar to previous UTI admission.    Upon arrival to the ED, patient was tachy to 133 with /63, temp 99.9, and SpO2 94%% on RA. Labs remarkable for WBC 22.76, Cr 1.8 (baseline 1.2), , troponin 0.019. UA with 1+ protein, 3+ blood, + nitrites, 3+ leuks, >100 RBCs, >100 WBCs. EKG with sinus tach to 113. CXR with no acute process. Given 1L IVF and CTX x1.     Overview/Hospital Course:  Patient admitted for sepsis secondary to UTI. Blood cultures drawn on admission quickly positive for e coli. Repeat blood cultures negative thus far. Febrile to 102.8 early AM 8/21. Continuing ceftriaxone. If afebrile for 24 hours, will switch to PO antibiotics.     Interval History: Patient feeling well this morning but did have high fever to 102.8 overnight. Denies chest pain, SOB, n/v, c/d.       Review of Systems  Objective:     Vital Signs (Most Recent):  Temp: 97.9 °F (36.6 °C) (08/21/24 0805)  Pulse: 67 (08/21/24 0831)  Resp: 16 (08/21/24 0831)  BP: (!) 96/58 (08/21/24 0805)  SpO2: (!) 94 % (08/21/24 0805) Vital Signs (24h Range):  Temp:  [97.8 °F (36.6 °C)-102.8 °F  (39.3 °C)] 97.9 °F (36.6 °C)  Pulse:  [] 67  Resp:  [16-19] 16  SpO2:  [94 %-96 %] 94 %  BP: ()/(58-77) 96/58     Weight: 59.5 kg (131 lb 2.8 oz)  Body mass index is 22.52 kg/m².    Intake/Output Summary (Last 24 hours) at 8/21/2024 1201  Last data filed at 8/21/2024 0900  Gross per 24 hour   Intake 300 ml   Output 175 ml   Net 125 ml         Physical Exam  Vitals and nursing note reviewed.   Constitutional:       General: He is not in acute distress.     Appearance: Normal appearance. He is not ill-appearing.   HENT:      Head: Normocephalic and atraumatic.      Right Ear: External ear normal.      Left Ear: External ear normal.   Eyes:      Extraocular Movements: Extraocular movements intact.      Conjunctiva/sclera: Conjunctivae normal.      Pupils: Pupils are equal, round, and reactive to light.   Cardiovascular:      Rate and Rhythm: Normal rate and regular rhythm.      Pulses: Normal pulses.      Heart sounds: Normal heart sounds. No murmur heard.  Pulmonary:      Effort: Pulmonary effort is normal. No respiratory distress.      Breath sounds: No wheezing or rales.   Abdominal:      General: Abdomen is flat. Bowel sounds are normal. There is no distension.      Palpations: Abdomen is soft.      Tenderness: There is no abdominal tenderness. There is no right CVA tenderness or left CVA tenderness.      Comments: No CVA tenderness   Musculoskeletal:         General: Normal range of motion.      Cervical back: Normal range of motion and neck supple.      Right lower leg: No edema.      Left lower leg: No edema.   Skin:     General: Skin is warm and dry.      Capillary Refill: Capillary refill takes less than 2 seconds.      Coloration: Skin is not jaundiced.   Neurological:      General: No focal deficit present.      Mental Status: He is alert and oriented to person, place, and time. Mental status is at baseline.      Cranial Nerves: No cranial nerve deficit.   Psychiatric:         Mood and Affect:  Mood normal.         Behavior: Behavior normal.             Significant Labs: All pertinent labs within the past 24 hours have been reviewed.    CBC:   Recent Labs   Lab 08/20/24  0550 08/21/24  0219   WBC 6.55 6.90   HGB 12.4* 12.1*   HCT 37.8* 36.4*    259     CMP:   Recent Labs   Lab 08/20/24  0550 08/21/24  0219    138   K 3.7 3.7    104   CO2 24 26   * 135*   BUN 15 14   CREATININE 1.4 1.4   CALCIUM 8.8 8.8   ANIONGAP 9 8       Significant Imaging: I have reviewed all pertinent imaging results/findings within the past 24 hours.    Assessment/Plan:      * Sepsis due to urinary tract infection  Patient with 2/4 SIRS criteria on 8/18/2024 -  tachycardia and leukocytosis (WBC 23). Infectious work-up includes: CXR negative for consolidation COVID/RSV/flu negative no known skin lesions denies diarrhea. Possible sources include: Urinary Tract. UA with 3+ leuks, 3+ blood, >100 WBC, >100 RBCs. Blood cultures 8/18 positive 2/2 GNR bacteremia. Patient meets criteria for sepsis. Sensitivities resulted. E coli bacteremia. Continuing IV abx as patient still febrile.  - continue ceftriaxone (8/18 -)   - low threshold for renal ultrasound to evaluate for abscess    Antibiotics (72h ago, onward)      Start     Stop Route Frequency Ordered    08/20/24 0000  cefTRIAXone (ROCEPHIN) 2 g in D5W 100 mL IVPB (MB+)         -- IV Every 24 hours (non-standard times) 08/19/24 1054               E coli bacteremia  As above      Acute renal failure superimposed on chronic kidney disease  Resolved  AMBROCIO is likely due to sepsis/UTI. Baseline creatinine is  1.2 . Most recent creatinine and eGFR are listed below.  Recent Labs     08/19/24  0521 08/20/24  0550 08/21/24  0219   CREATININE 1.5* 1.4 1.4   EGFRNORACEVR 51.3* 55.8* 55.8*        Plan  - s/p 2L Ivf on admission   - Avoid nephrotoxins and renally dose meds for GFR listed above  - Monitor urine output, serial BMP, and adjust therapy as needed    COPD (chronic  obstructive pulmonary disease)  Patient's COPD is controlled currently.  Patient is currently off COPD Pathway. Continue scheduled inhalers and monitor respiratory status closely.   - goal spO2 88%   - continue home inhalers  - duonebs prn    BPH (benign prostatic hyperplasia)  - continue home flomax        VTE Risk Mitigation (From admission, onward)           Ordered     heparin (porcine) injection 5,000 Units  Every 8 hours         08/19/24 1054     IP VTE LOW RISK PATIENT  Once         08/18/24 1116     Place sequential compression device  Until discontinued         08/18/24 1116                    Discharge Planning   KAPIL: 8/22/2024     Code Status: Full Code   Is the patient medically ready for discharge?:     Reason for patient still in hospital (select all that apply): Patient trending condition and Treatment  Discharge Plan A: Home   Discharge Delays: None known at this time              Charisse Howell MD  Department of Hospital Medicine   [facility] - Med Surg     None

## 2024-08-21 NOTE — ASSESSMENT & PLAN NOTE
Resolved  AMBROCIO is likely due to sepsis/UTI. Baseline creatinine is  1.2 . Most recent creatinine and eGFR are listed below.  Recent Labs     08/19/24  0521 08/20/24  0550 08/21/24  0219   CREATININE 1.5* 1.4 1.4   EGFRNORACEVR 51.3* 55.8* 55.8*        Plan  - s/p 2L Ivf on admission   - Avoid nephrotoxins and renally dose meds for GFR listed above  - Monitor urine output, serial BMP, and adjust therapy as needed

## 2024-08-21 NOTE — ASSESSMENT & PLAN NOTE
Patient with 2/4 SIRS criteria on 8/18/2024 -  tachycardia and leukocytosis (WBC 23). Infectious work-up includes: CXR negative for consolidation COVID/RSV/flu negative no known skin lesions denies diarrhea. Possible sources include: Urinary Tract. UA with 3+ leuks, 3+ blood, >100 WBC, >100 RBCs. Blood cultures 8/18 positive 2/2 GNR bacteremia. Patient meets criteria for sepsis. Sensitivities resulted. E coli bacteremia. Continuing IV abx as patient still febrile.  - continue ceftriaxone (8/18 -)   - low threshold for renal ultrasound to evaluate for abscess    Antibiotics (72h ago, onward)      Start     Stop Route Frequency Ordered    08/20/24 0000  cefTRIAXone (ROCEPHIN) 2 g in D5W 100 mL IVPB (MB+)         -- IV Every 24 hours (non-standard times) 08/19/24 1054

## 2024-08-22 VITALS
WEIGHT: 131.19 LBS | TEMPERATURE: 99 F | DIASTOLIC BLOOD PRESSURE: 73 MMHG | BODY MASS INDEX: 22.4 KG/M2 | OXYGEN SATURATION: 96 % | SYSTOLIC BLOOD PRESSURE: 153 MMHG | HEART RATE: 88 BPM | RESPIRATION RATE: 18 BRPM | HEIGHT: 64 IN

## 2024-08-22 LAB
ALBUMIN SERPL BCP-MCNC: 2.6 G/DL (ref 3.5–5.2)
ALP SERPL-CCNC: 83 U/L (ref 55–135)
ALT SERPL W/O P-5'-P-CCNC: 26 U/L (ref 10–44)
ANION GAP SERPL CALC-SCNC: 11 MMOL/L (ref 8–16)
AST SERPL-CCNC: 44 U/L (ref 10–40)
BASOPHILS # BLD AUTO: 0.03 K/UL (ref 0–0.2)
BASOPHILS NFR BLD: 0.4 % (ref 0–1.9)
BILIRUB SERPL-MCNC: 0.2 MG/DL (ref 0.1–1)
BUN SERPL-MCNC: 11 MG/DL (ref 8–23)
CALCIUM SERPL-MCNC: 9.2 MG/DL (ref 8.7–10.5)
CHLORIDE SERPL-SCNC: 105 MMOL/L (ref 95–110)
CO2 SERPL-SCNC: 25 MMOL/L (ref 23–29)
CREAT SERPL-MCNC: 1.2 MG/DL (ref 0.5–1.4)
DIFFERENTIAL METHOD BLD: ABNORMAL
EOSINOPHIL # BLD AUTO: 0.2 K/UL (ref 0–0.5)
EOSINOPHIL NFR BLD: 2.1 % (ref 0–8)
ERYTHROCYTE [DISTWIDTH] IN BLOOD BY AUTOMATED COUNT: 14 % (ref 11.5–14.5)
EST. GFR  (NO RACE VARIABLE): >60 ML/MIN/1.73 M^2
GLUCOSE SERPL-MCNC: 102 MG/DL (ref 70–110)
HCT VFR BLD AUTO: 39.1 % (ref 40–54)
HGB BLD-MCNC: 13 G/DL (ref 14–18)
IMM GRANULOCYTES # BLD AUTO: 0.05 K/UL (ref 0–0.04)
IMM GRANULOCYTES NFR BLD AUTO: 0.6 % (ref 0–0.5)
LYMPHOCYTES # BLD AUTO: 1.6 K/UL (ref 1–4.8)
LYMPHOCYTES NFR BLD: 19.3 % (ref 18–48)
MAGNESIUM SERPL-MCNC: 1.9 MG/DL (ref 1.6–2.6)
MCH RBC QN AUTO: 31.7 PG (ref 27–31)
MCHC RBC AUTO-ENTMCNC: 33.2 G/DL (ref 32–36)
MCV RBC AUTO: 95 FL (ref 82–98)
MONOCYTES # BLD AUTO: 0.9 K/UL (ref 0.3–1)
MONOCYTES NFR BLD: 10.6 % (ref 4–15)
NEUTROPHILS # BLD AUTO: 5.6 K/UL (ref 1.8–7.7)
NEUTROPHILS NFR BLD: 67 % (ref 38–73)
NRBC BLD-RTO: 0 /100 WBC
PLATELET # BLD AUTO: 284 K/UL (ref 150–450)
PMV BLD AUTO: 9.3 FL (ref 9.2–12.9)
POTASSIUM SERPL-SCNC: 3.9 MMOL/L (ref 3.5–5.1)
PROT SERPL-MCNC: 6.4 G/DL (ref 6–8.4)
RBC # BLD AUTO: 4.1 M/UL (ref 4.6–6.2)
SODIUM SERPL-SCNC: 141 MMOL/L (ref 136–145)
WBC # BLD AUTO: 8.38 K/UL (ref 3.9–12.7)

## 2024-08-22 PROCEDURE — 25000003 PHARM REV CODE 250: Performed by: PHYSICIAN ASSISTANT

## 2024-08-22 PROCEDURE — 36415 COLL VENOUS BLD VENIPUNCTURE: CPT | Performed by: HOSPITALIST

## 2024-08-22 PROCEDURE — 83735 ASSAY OF MAGNESIUM: CPT | Performed by: HOSPITALIST

## 2024-08-22 PROCEDURE — 85025 COMPLETE CBC W/AUTO DIFF WBC: CPT | Performed by: HOSPITALIST

## 2024-08-22 PROCEDURE — 63600175 PHARM REV CODE 636 W HCPCS: Performed by: STUDENT IN AN ORGANIZED HEALTH CARE EDUCATION/TRAINING PROGRAM

## 2024-08-22 PROCEDURE — 80053 COMPREHEN METABOLIC PANEL: CPT | Performed by: HOSPITALIST

## 2024-08-22 PROCEDURE — 25000003 PHARM REV CODE 250: Performed by: HOSPITALIST

## 2024-08-22 PROCEDURE — 94761 N-INVAS EAR/PLS OXIMETRY MLT: CPT

## 2024-08-22 RX ORDER — CIPROFLOXACIN 500 MG/1
750 TABLET ORAL EVERY 12 HOURS
Qty: 33 TABLET | Refills: 0 | Status: CANCELLED | OUTPATIENT
Start: 2024-08-22 | End: 2024-09-02

## 2024-08-22 RX ORDER — CIPROFLOXACIN 500 MG/1
500 TABLET ORAL EVERY 12 HOURS
Qty: 24 TABLET | Refills: 0 | Status: SHIPPED | OUTPATIENT
Start: 2024-08-22 | End: 2024-09-03

## 2024-08-22 RX ORDER — CIPROFLOXACIN 500 MG/1
500 TABLET ORAL EVERY 12 HOURS
Status: DISCONTINUED | OUTPATIENT
Start: 2024-08-22 | End: 2024-08-22 | Stop reason: HOSPADM

## 2024-08-22 RX ADMIN — FLUTICASONE FUROATE AND VILANTEROL TRIFENATATE 1 PUFF: 100; 25 POWDER RESPIRATORY (INHALATION) at 08:08

## 2024-08-22 RX ADMIN — HEPARIN SODIUM 5000 UNITS: 5000 INJECTION INTRAVENOUS; SUBCUTANEOUS at 05:08

## 2024-08-22 RX ADMIN — TAMSULOSIN HYDROCHLORIDE 0.4 MG: 0.4 CAPSULE ORAL at 08:08

## 2024-08-22 RX ADMIN — CIPROFLOXACIN 500 MG: 500 TABLET ORAL at 09:08

## 2024-08-22 RX ADMIN — PANTOPRAZOLE SODIUM 40 MG: 40 TABLET, DELAYED RELEASE ORAL at 08:08

## 2024-08-22 NOTE — ASSESSMENT & PLAN NOTE
Resolved  AMBROCIO is likely due to sepsis/UTI. Baseline creatinine is  1.2 . Most recent creatinine and eGFR are listed below.  Recent Labs     08/20/24  0550 08/21/24  0219   CREATININE 1.4 1.4   EGFRNORACEVR 55.8* 55.8*      Plan  - s/p 2L Ivf on admission   - Avoid nephrotoxins and renally dose meds for GFR listed above  - Monitor urine output, serial BMP, and adjust therapy as needed

## 2024-08-22 NOTE — NURSING
MD removed discharge orders. Pt decided to go AMA after speaking with MD. Peripheral IV removed, pt has walked off unit with belongings.

## 2024-08-22 NOTE — DISCHARGE SUMMARY
[facility]  Discharge Summary      Patient Name: Darshan Hoskins  MRN: [id number]  RICHA: [id number]  Patient Class: IP- Inpatient  Admission Date: 8/18/2024  Hospital Length of Stay: 4 days  Discharge Date and Time:  08/22/2024 6:54 AM  Attending Physician: No att. providers found   Discharging Provider: Luis Soto MD  Primary Care Provider: Suzette Madden [facility] Medicine Team: [facility] R Luis Soto MD  Primary Care Team: [facility]    HPI:   Darshan Hoskins is a 63 y.o. male with PMHx significant for BPH and CKD admitted to hospital medicine for sepsis 2/2 UTI. Patient reports dizziness/fatigue, emesis and subjective fever for the past 2 days. Also endorses noting cloudy, red urine today, which prompted his visit to the ED. Denies CP, SOB, abdominal pain, back pain, changes in BMs, urinary symptoms. Of note, patient was admitted to [facility] in Sept of 2022 for sepsis 2/2 pan sensitive E. Coli UTI. States his symptoms today feel similar to previous UTI admission.    Upon arrival to the ED, patient was tachy to 133 with /63, temp 99.9, and SpO2 94%% on RA. Labs remarkable for WBC 22.76, Cr 1.8 (baseline 1.2), , troponin 0.019. UA with 1+ protein, 3+ blood, + nitrites, 3+ leuks, >100 RBCs, >100 WBCs. EKG with sinus tach to 113. CXR with no acute process. Given 1L IVF and CTX x1.     * No surgery found *      Hospital Course:   Patient admitted for sepsis secondary to UTI. Blood cultures drawn on admission quickly positive for e coli. Repeat blood cultures negative thus far. Febrile to 102.8 early AM 8/21. Continuing ceftriaxone. If afebrile for 24 hours, will switch to PO antibiotics.   8/22 Afebrile for 24h. BC x2 8/19 NGTD. switch to ciprofloxacin for 14 days. renal songram ordered  -6 mm nonobstructing right renal stone.  No hydronephrosis.Mild prostatomegaly. Patient refused CT renal stone study and post void residual volume as  reecommeneded by urology. signed out AMA       Goals of Care Treatment Preferences:  Code Status: Full Code           Assessment/Plan:      * Sepsis due to urinary tract infection  Patient with 2/4 SIRS criteria on 8/18/2024 -  tachycardia and leukocytosis (WBC 23). Infectious work-up includes: CXR negative for consolidation COVID/RSV/flu negative no known skin lesions denies diarrhea. Possible sources include: Urinary Tract. UA with 3+ leuks, 3+ blood, >100 WBC, >100 RBCs. Blood cultures 8/18 positive 2/2 GNR bacteremia. Patient meets criteria for sepsis. Sensitivities resulted. E coli bacteremia. Continuing IV abx as patient still febrile.  - continue ceftriaxone (8/18 -)   - low threshold for renal ultrasound to evaluate for abscess    Antibiotics (72h ago, onward)      Start     Stop Route Frequency Ordered    08/20/24 0000  cefTRIAXone (ROCEPHIN) 2 g in D5W 100 mL IVPB (MB+)         -- IV Every 24 hours (non-standard times) 08/19/24 1054          8/22 Afebrile for 24h. BC x2 8/19 NGTD. switch to ciprofloxacin for 14 days     E coli bacteremia  As above  xone. If afebrile for 24 hours, will switch to PO antibiotics.   8/22 Afebrile for 24h. BC x2 8/19 NGTD. switch to ciprofloxacin for 14 days     Acute renal failure superimposed on chronic kidney disease  Resolved  AMBROCIO is likely due to sepsis/UTI. Baseline creatinine is  1.2 . Most recent creatinine and eGFR are listed below.  Recent Labs     08/20/24  0550 08/21/24  0219   CREATININE 1.4 1.4   EGFRNORACEVR 55.8* 55.8*      Plan  - s/p 2L Ivf on admission   - Avoid nephrotoxins and renally dose meds for GFR listed above  - Monitor urine output, serial BMP, and adjust therapy as needed    COPD (chronic obstructive pulmonary disease)  Patient's COPD is controlled currently.  Patient is currently off COPD Pathway. Continue scheduled inhalers and monitor respiratory status closely.   - goal spO2 88%   - continue home inhalers  - duonebs prn  8/22 sats 93% on  RA    BPH (benign prostatic hyperplasia)  - continue home flomax    Consults:   Consults (From admission, onward)          Status Ordering Provider     Inpatient consult to Urology  Once        Provider:  (Not yet assigned)    Acknowledged CARITO ORANTES            Final Active Diagnoses:    Diagnosis Date Noted POA    PRINCIPAL PROBLEM:  Sepsis due to urinary tract infection [A41.9, N39.0] 08/18/2024 Yes    Acute renal failure superimposed on chronic kidney disease [N17.9, N18.9] 08/18/2024 Yes    E coli bacteremia [R78.81, B96.20] 08/18/2024 Yes    COPD (chronic obstructive pulmonary disease) [J44.9] 09/24/2022 Yes    BPH (benign prostatic hyperplasia) [N40.0] 09/24/2022 Yes      Problems Resolved During this Admission:       Discharged Condition: fair    Disposition: Left Against Medical Adv*home    Follow Up:   Follow-up Information       Suzette Madden Of Follow up.    Contact information:  [address]  [facility] [id number] [phone]                           Patient Instructions:      Ambulatory referral/consult to Urology   Standing Status: Future   Referral Priority: Routine Referral Type: Consultation   Referral Reason: Specialty Services Required   Requested Specialty: Urology   Number of Visits Requested: 1       Significant Diagnostic Studies: Labs: BMP:   Recent Labs   Lab 08/21/24  0219 08/22/24  1154   * 102    141   K 3.7 3.9    105   CO2 26 25   BUN 14 11   CREATININE 1.4 1.2   CALCIUM 8.8 9.2   MG 1.8 1.9   , CMP   Recent Labs   Lab 08/21/24  0219 08/22/24  1154    141   K 3.7 3.9    105   CO2 26 25   * 102   BUN 14 11   CREATININE 1.4 1.2   CALCIUM 8.8 9.2   PROT  --  6.4   ALBUMIN  --  2.6*   BILITOT  --  0.2   ALKPHOS  --  83   AST  --  44*   ALT  --  26   ANIONGAP 8 11   , CBC   Recent Labs   Lab 08/21/24  0219 08/22/24  1154   WBC 6.90 8.38   HGB 12.1* 13.0*   HCT 36.4* 39.1*    284   , INR   Lab Results   Component  "Value Date    INR 0.9 06/20/2006   , Lipid Panel No results found for: "CHOL", "HDL", "LDLCALC", "TRIG", "CHOLHDL", Troponin   Recent Labs   Lab 08/18/24  0818 08/18/24  1036 08/18/24  1228   TROPONINI 0.019 0.009 0.016   , A1C: No results for input(s): "HGBA1C" in the last 4320 hours., and All labs within the past 24 hours have been reviewed  Microbiology: Blood Culture   Lab Results   Component Value Date    LABBLOO No Growth to date 08/19/2024    LABBLOO No Growth to date 08/19/2024    LABBLOO No Growth to date 08/19/2024    LABBLOO No Growth to date 08/19/2024   , Sputum Culture No results found for: "GSRESP", "RESPIRATORYC", and Urine Culture    Lab Results   Component Value Date    LABURIN ESCHERICHIA COLI  >100,000 cfu/ml   (A) 08/18/2024       US Retroperitoneal Complete  Narrative: EXAMINATION:  US RETROPERITONEAL COMPLETE    CLINICAL HISTORY:  AMBROCIO;    TECHNIQUE:  Ultrasound of the kidneys and urinary bladder was performed including color flow and Doppler evaluation of the kidneys.    COMPARISON:  Ultrasound retroperitoneal complete 09/26/2022.    FINDINGS:  Right kidney: The right kidney measures 9.3 cm. No cortical thinning. No loss of corticomedullary distinction. Resistive index measures 0.73.  6 mm echogenic focus, favoring a nonobstructing renal stone.  No hydronephrosis or solid renal lesion.    Left kidney: The left kidney measures 9.6 cm. No cortical thinning. No loss of corticomedullary distinction. Resistive index measures 0.77.  No mass. No renal stone. No hydronephrosis.    The bladder is partially distended at the time of scanning and has an unremarkable appearance.    Prostate measures 4.7 x 4.5 x 4.2 cm.  Impression: 6 mm nonobstructing right renal stone.  No hydronephrosis.    Mild prostatomegaly.    Electronically signed by: Anshul Smalls  Date:    08/22/2024  Time:    15:54       Pending Diagnostic Studies:       None           Medications:  Reconciled Home Medications:      Medication List "        START taking these medications      ciprofloxacin HCl 500 MG tablet  Commonly known as: CIPRO  Take 1 tablet (500 mg total) by mouth every 12 (twelve) hours. for 12 days            CONTINUE taking these medications      benzonatate 200 MG capsule  Commonly known as: TESSALON PERLES  Take 1 capsule (200 mg total) by mouth 3 (three) times daily as needed for Cough.     budesonide-formoterol 160-4.5 mcg 160-4.5 mcg/actuation Hfaa  Commonly known as: SYMBICORT  Inhale 2 puffs into the lungs every 12 (twelve) hours. Controller     esomeprazole 20 MG capsule  Commonly known as: NEXIUM  Take 20 mg by mouth before breakfast.     naproxen 500 MG tablet  Commonly known as: NAPROSYN  Take 500 mg by mouth 2 (two) times daily as needed (for pain.).     pantoprazole 40 MG tablet  Commonly known as: PROTONIX  Take 40 mg by mouth once daily.     tamsulosin 0.4 mg Cap  Commonly known as: FLOMAX  Take 0.4 mg by mouth once daily.     VENTOLIN HFA INHL  Inhale 1 puff into the lungs every 6 (six) hours as needed (for shortness of breath.).              Indwelling Lines/Drains at time of discharge:   Lines/Drains/Airways       None                  40  minutes of time spent on discharge, including examining the patient, providing discharge instructions, arranging   follow up and documentation        Luis Soto MD  Attending Staff Physician  [facility]  pager- [phone] Trvggunaviw - [phone]

## 2024-08-22 NOTE — PROGRESS NOTES
[facility] Medicine  Progress Note    Patient Name: Darshan Hoskins  MRN: [id number]  Patient Class: IP- Inpatient   Admission Date: 8/18/2024  Length of Stay: 4 days  Attending Physician: Luis Soto MD  Primary Care Provider: Suzette Madden Of        Subjective:     Principal Problem:Sepsis due to urinary tract infection        HPI:  Darshan Hoskins is a 63 y.o. male with PMHx significant for BPH and CKD admitted to hospital medicine for sepsis 2/2 UTI. Patient reports dizziness/fatigue, emesis and subjective fever for the past 2 days. Also endorses noting cloudy, red urine today, which prompted his visit to the ED. Denies CP, SOB, abdominal pain, back pain, changes in BMs, urinary symptoms. Of note, patient was admitted to [facility] in Sept of 2022 for sepsis 2/2 pan sensitive E. Coli UTI. States his symptoms today feel similar to previous UTI admission.    Upon arrival to the ED, patient was tachy to 133 with /63, temp 99.9, and SpO2 94%% on RA. Labs remarkable for WBC 22.76, Cr 1.8 (baseline 1.2), , troponin 0.019. UA with 1+ protein, 3+ blood, + nitrites, 3+ leuks, >100 RBCs, >100 WBCs. EKG with sinus tach to 113. CXR with no acute process. Given 1L IVF and CTX x1.     Overview/Hospital Course:  Patient admitted for sepsis secondary to UTI. Blood cultures drawn on admission quickly positive for e coli. Repeat blood cultures negative thus far. Febrile to 102.8 early AM 8/21. Continuing ceftriaxone. If afebrile for 24 hours, will switch to PO antibiotics.   8/22 Afebrile for 24h. BC x2 8/19 NGTD. switch to ciprofloxacin for 14 days      Review of Systems:   Pain scale:  Constitutional:  fever,  chills, headache, vision loss, hearing loss, weight loss, Generalized weakness, falls, loss of smell, loss of taste, poor appetite,  sore throat  Respiratory: cough, shortness of breath.   Cardiovascular: chest pain, dizziness, palpitations, orthopnea, swelling of feet,  syncope  Gastrointestinal: nausea, vomiting, abdominal pain, diarrhea, black stool,  blood in stool, change in bowel habits, constipation  Genitourinary: hematuria, dysuria, urgency, frequency  Integument/Breast: rash,  pruritis  Hematologic/Lymphatic: easy bruising, lymphadenopathy  Musculoskeletal: arthralgias , myalgias, back pain, neck pain, knee pain  Neurological: confusion, seizures, tremors, slurred speech  Behavioral/Psych:  depression, anxiety, auditory or visual hallucinations     OBJECTIVE:     Physical Exam:  Body mass index is 22.52 kg/m².    Constitutional: Appears well-developed and well-nourished.   Head: Normocephalic and atraumatic.   Neck: Normal range of motion. Neck supple.   Cardiovascular: Normal heart rate.  Regular heart rhythm.  Pulmonary/Chest: Effort normal.   Abdominal: No distension.  No tenderness  Musculoskeletal: Normal range of motion. No edema.   Neurological: Alert and oriented to person, place, and time.   Skin: Skin is warm and dry.   Psychiatric: Normal mood and affect. Behavior is normal.                  Vital Signs  Temp: 98.7 °F (37.1 °C) (08/22/24 0429)  Pulse: 70 (08/22/24 0429)  Resp: 19 (08/22/24 0429)  BP: (!) 144/77 (08/22/24 0429)  SpO2: (!) 93 % (08/22/24 0429)     24 Hour VS Range    Temp:  [97.8 °F (36.6 °C)-99.8 °F (37.7 °C)]   Pulse:  [67-79]   Resp:  [16-19]   BP: ()/(57-77)   SpO2:  [90 %-95 %]     Intake/Output Summary (Last 24 hours) at 8/22/2024 0659  Last data filed at 8/21/2024 1800  Gross per 24 hour   Intake 747 ml   Output --   Net 747 ml         I/O This Shift:  No intake/output data recorded.    Wt Readings from Last 3 Encounters:   08/19/24 59.5 kg (131 lb 2.8 oz)   09/25/22 59.8 kg (131 lb 13.4 oz)   09/20/22 60.8 kg (134 lb 0.6 oz)       I have personally reviewed the vitals and recorded Intake/Output     Laboratory/Diagnostic Data:    CBC/Anemia Labs: Coags:    Recent Labs   Lab 08/19/24  0521 08/20/24  0550 08/21/24  0219   WBC 10.50 6.55  "6.90   HGB 14.1 12.4* 12.1*   HCT 43.2 37.8* 36.4*    245 259   MCV 98 96 95   RDW 14.0 13.4 13.6    No results for input(s): "PT", "INR", "APTT" in the last 168 hours.     Chemistries: ABG:   Recent Labs   Lab 08/18/24  0818 08/19/24  0521 08/20/24  0550 08/21/24  0219    139 136 138   K 3.7 4.3 3.7 3.7    102 103 104   CO2 23 27 24 26   BUN 19 19 15 14   CREATININE 1.8* 1.5* 1.4 1.4   CALCIUM 9.7 9.3 8.8 8.8   PROT 7.3  --   --   --    BILITOT 0.9  --   --   --    ALKPHOS 99  --   --   --    ALT 13  --   --   --    AST 20  --   --   --    MG  --  2.0 2.0 1.8    No results for input(s): "PH", "PCO2", "PO2", "HCO3", "POCSATURATED", "BE" in the last 168 hours.     POCT Glucose: HbA1c:    No results for input(s): "POCTGLUCOSE" in the last 168 hours. No results found for: "HGBA1C"     Cardiac Enzymes: Ejection Fractions:    No results for input(s): "CPK", "CPKMB", "MB", "TROPONINI" in the last 72 hours. No results found for: "EF"       No results for input(s): "COLORU", "APPEARANCEUA", "PHUR", "SPECGRAV", "PROTEINUA", "GLUCUA", "KETONESU", "BILIRUBINUA", "OCCULTUA", "NITRITE", "UROBILINOGEN", "LEUKOCYTESUR", "RBCUA", "WBCUA", "BACTERIA", "SQUAMEPITHEL", "HYALINECASTS" in the last 48 hours.    Invalid input(s): "WRIGHTSUR"    Lactate (Lactic Acid) (mmol/L)   Date Value   08/18/2024 1.5   09/26/2022 0.9   09/24/2022 1.0     BNP (pg/mL)   Date Value   08/18/2024 102 (H)   08/18/2024 102 (H)   09/24/2022 <10     No results found for: "CRP", "SEDRATE"  No results found for: "DDIMER"  No results found for: "FERRITIN"  No results found for: "LDH"  Troponin I (ng/mL)   Date Value   08/18/2024 0.016   08/18/2024 0.009   08/18/2024 0.019   09/24/2022 <0.006   09/20/2022 <0.006     CPK (U/L)   Date Value   06/20/2006 106     No results found for this or any previous visit.  SARS-CoV-2 RNA, Amplification, Qual (no units)   Date Value   08/18/2024 Negative   09/24/2022 Negative   09/20/2022 Negative     POC Rapid " COVID (no units)   Date Value   02/05/2021 Negative       Microbiology labs for the last week  Microbiology Results (last 7 days)       Procedure Component Value Units Date/Time    Blood culture [[id number]] Collected: 08/19/24 0521    Order Status: Completed Specimen: Blood from Peripheral, Antecubital, Right Updated: 08/22/24 0613     Blood Culture, Routine No Growth to date      No Growth to date      No Growth to date      No Growth to date    Narrative:      [id number]_Right Peripheral    Blood culture [[id number]] Collected: 08/19/24 1122    Order Status: Completed Specimen: Blood from Peripheral, Lower Arm, Left Updated: 08/21/24 1412     Blood Culture, Routine No Growth to date      No Growth to date      No Growth to date    Narrative:      [id number]_Left Peripheral    Blood culture x two cultures. Draw prior to antibiotics. [[id number]]  (Abnormal) Collected: 08/18/24 0832    Order Status: Completed Specimen: Blood from Peripheral, Antecubital, Right Updated: 08/21/24 0746     Blood Culture, Routine Gram stain francis bottle: Gram negative rods      Results called to and read back by: Roberta KOVACS 08/18/2024  22:11      Gram stain aer bottle: Gram negative rods      Positive results previously called 08/19/2024  08:43      ESCHERICHIA COLI  For susceptibility see order #[id number]      Narrative:      Aerobic and anaerobic    Blood culture x two cultures. Draw prior to antibiotics. [[id number]]  (Abnormal)  (Susceptibility) Collected: 08/18/24 0825    Order Status: Completed Specimen: Blood from Peripheral, Antecubital, Right Updated: 08/21/24 0746     Blood Culture, Routine Gram stain francis bottle: Gram negative rods      Results called to and read back by: Roberta KOVACS 08/18/2024  22:07      Gram stain aer bottle: Gram negative rods      Positive results previously called 08/18/2024  22:12      ESCHERICHIA COLI    Narrative:      Aerobic and anaerobic    Urine culture [[id number]]  (Abnormal)  (Susceptibility) Collected:  08/18/24 0927    Order Status: Completed Specimen: Urine Updated: 08/20/24 0908     Urine Culture, Routine ESCHERICHIA COLI  >100,000 cfu/ml      Narrative:      Specimen Source->Urine    Rapid Organism ID by PCR (from Blood culture) [[id number]]  (Abnormal) Collected: 08/18/24 0832    Order Status: Completed Updated: 08/18/24 2315     Enterococcus faecalis Not Detected     Enterococcus faecium Not Detected     Listeria monocytogenes Not Detected     Staphylococcus spp. Not Detected     Staphylococcus aureus Not Detected     Staphylococcus epidermidis Not Detected     Staphylococcus lugdunensis Not Detected     Streptococcus species Not Detected     Streptococcus agalactiae Not Detected     Streptococcus pneumoniae Not Detected     Streptococcus pyogenes Not Detected     Acinetobacter calcoaceticus/baumannii complex Not Detected     Bacteroides fragilis Not Detected     Enterobacterales See species for ID     Enterobacter cloacae complex Not Detected     Escherichia coli Detected     Klebsiella aerogenes Not Detected     Klebsiella oxytoca Not Detected     Klebsiella pneumoniae group Not Detected     Proteus Not Detected     Salmonella sp Not Detected     Serratia marcescens Not Detected     Haemophilus influenzae Not Detected     Neisseria meningtidis Not Detected     Pseudomonas aeruginosa Not Detected     Stenotrophomonas maltophilia Not Detected     Candida albicans Not Detected     Candida auris Not Detected     Candida glabrata Not Detected     Candida krusei Not Detected     Candida parapsilosis Not Detected     Candida tropicalis Not Detected     Cryptococcus neoformans/gattii Not Detected     CTX-M (ESBL ) Not Detected     IMP (Carbapenem resistant) Not Detected     KPC resistance gene (Carbapenem resistant) Not Detected     mcr-1  Not Detected     mec A/C  Test Not Applicable     mec A/C and MREJ (MRSA) gene Test Not Applicable     NDM (Carbapenem resistant) Not Detected     OXA-48-like (Carbapenem  resistant) Not Detected     van A/B (VRE gene) Test Not Applicable     VIM (Carbapenem resistant) Not Detected    Narrative:      Aerobic and anaerobic            Reviewed and noted in plan where applicable- Please see chart for full lab data.    Lines/Drains:       Peripheral IV - Single Lumen 08/19/24 2035 22 G 1 3/4 in Right Upper Arm (Active)   Site Assessment Dry;Intact;Clean 08/22/24 0400   Extremity Assessment Distal to IV No abnormal discoloration;No redness;No swelling;No warmth 08/20/24 1920   Line Status Saline locked;Capped 08/22/24 0400   Dressing Status Intact;Dry;Clean 08/22/24 0400   Dressing Intervention Integrity maintained 08/22/24 0400   Site Change Due 08/22/24 08/21/24 0800   Reason Not Rotated Not due 08/21/24 0800   Number of days: 2       Imaging  ECG Results              EKG 12-lead (Final result)        Collection Time Result Time QRS Duration OHS QTC Calculation    08/18/24 07:46:39 08/18/24 09:35:03 60 427                     Final result by Interface, Lab In [facility] (08/18/24 09:35:07)                   Narrative:    Test Reason : R53.1,    Vent. Rate : 113 BPM     Atrial Rate : 113 BPM     P-R Int : 148 ms          QRS Dur : 060 ms      QT Int : 312 ms       P-R-T Axes : 077 060 073 degrees     QTc Int : 427 ms    Sinus tachycardia  Otherwise normal ECG  When compared with ECG of 18-AUG-2024 07:28,  No significant change was found  Confirmed by Salazar MARSHALL MD ([id number]) on 8/18/2024 9:34:58 AM    Referred By: [email or web address]   SELF           Confirmed By:Salazar MARSHALL MD                                     EKG 12-lead (Final result)        Collection Time Result Time QRS Duration OHS QTC Calculation    08/18/24 07:28:04 08/18/24 09:35:34 62 435                     Final result by Interface, Lab In [facility] (08/18/24 09:35:41)                   Narrative:    Test Reason : R07.9,    Vent. Rate : 123 BPM     Atrial Rate : 123 BPM     P-R Int : 144 ms          QRS Dur : 062 ms      QT Int : 304 ms        P-R-T Axes : 069 009 073 degrees     QTc Int : 435 ms    Sinus tachycardia  Otherwise normal ECG  When compared with ECG of 24-SEP-2022 16:55,  No significant change was found  Confirmed by Salazar MARSHALL MD ([id number]) on 8/18/2024 9:35:33 AM    Referred By: DARION   SELF           Confirmed By:Salazar MARSHALL MD                                    No results found for this or any previous visit.      X-Ray Chest AP Portable  Narrative: EXAMINATION:  XR CHEST AP PORTABLE    CLINICAL HISTORY:  Chest Pain;    TECHNIQUE:  Single frontal view of the chest was performed.    COMPARISON:  09/20/2022    FINDINGS:  The lungs are clear with normal appearance of pulmonary vasculature. No pleural effusion. No evident pneumothorax.    The cardiac silhouette is normal in size. The hilar and mediastinal contours are unremarkable.    Bones are intact.  Impression: No acute abnormality.    Electronically signed by: Senthil Hdez MD  Date:    08/18/2024  Time:    08:37      Labs, Imaging, EKG and Diagnostic results from 8/22/2024 were reviewed.    Medications:  Medication list was reviewed and changes noted under Assessment/Plan.  No current facility-administered medications on file prior to encounter.     Current Outpatient Medications on File Prior to Encounter   Medication Sig Dispense Refill    albuterol sulfate (VENTOLIN HFA INHL) Inhale 1 puff into the lungs every 6 (six) hours as needed (for shortness of breath.).      benzonatate (TESSALON) 200 MG capsule Take 1 capsule (200 mg total) by mouth 3 (three) times daily as needed for Cough. 20 capsule 0    budesonide-formoterol 160-4.5 mcg (SYMBICORT) 160-4.5 mcg/actuation HFAA Inhale 2 puffs into the lungs every 12 (twelve) hours. Controller      esomeprazole (NEXIUM) 20 MG capsule Take 20 mg by mouth before breakfast.      naproxen (NAPROSYN) 500 MG tablet Take 500 mg by mouth 2 (two) times daily as needed (for pain.).      pantoprazole (PROTONIX) 40 MG tablet Take 40 mg by mouth once  daily.      tamsulosin (FLOMAX) 0.4 mg Cap Take 0.4 mg by mouth once daily.       Scheduled Medications:  Current Facility-Administered Medications   Medication Dose Route Frequency    cefTRIAXone (Rocephin) IV (PEDS and ADULTS)  2 g Intravenous Q24H    fluticasone furoate-vilanteroL  1 puff Inhalation Daily    heparin (porcine)  5,000 Units Subcutaneous Q8H    pantoprazole  40 mg Oral Daily    tamsulosin  0.4 mg Oral Daily     PRN:   Current Facility-Administered Medications:     acetaminophen, 650 mg, Oral, Q4H PRN    albuterol-ipratropium, 3 mL, Nebulization, Q4H PRN    bisacodyL, 10 mg, Rectal, Daily PRN    dextrose 10%, 12.5 g, Intravenous, PRN    dextrose 10%, 25 g, Intravenous, PRN    glucagon (human recombinant), 1 mg, Intramuscular, PRN    glucose, 16 g, Oral, PRN    glucose, 24 g, Oral, PRN    melatonin, 6 mg, Oral, Nightly PRN    naloxone, 0.02 mg, Intravenous, PRN    ondansetron, 4 mg, Intravenous, Q8H PRN    polyethylene glycol, 17 g, Oral, Daily PRN    prochlorperazine, 5 mg, Intravenous, Q6H PRN    sodium chloride 0.9%, 10 mL, Intravenous, Q8H PRN  Infusions:   Estimated Creatinine Clearance: 44 mL/min (based on SCr of 1.4 mg/dL).               Assessment/Plan:      * Sepsis due to urinary tract infection  Patient with 2/4 SIRS criteria on 8/18/2024 -  tachycardia and leukocytosis (WBC 23). Infectious work-up includes: CXR negative for consolidation COVID/RSV/flu negative no known skin lesions denies diarrhea. Possible sources include: Urinary Tract. UA with 3+ leuks, 3+ blood, >100 WBC, >100 RBCs. Blood cultures 8/18 positive 2/2 GNR bacteremia. Patient meets criteria for sepsis. Sensitivities resulted. E coli bacteremia. Continuing IV abx as patient still febrile.  - continue ceftriaxone (8/18 -)   - low threshold for renal ultrasound to evaluate for abscess    Antibiotics (72h ago, onward)      Start     Stop Route Frequency Ordered    08/20/24 0000  cefTRIAXone (ROCEPHIN) 2 g in D5W 100 mL IVPB  (MB+)         -- IV Every 24 hours (non-standard times) 08/19/24 1054             8/22 Afebrile for 24h. BC x2 8/19 NGTD. switch to levaquin for 14 days    E coli bacteremia  As above  8/22 Afebrile for 24h. BC x2 8/19 NGTD. switch to levaquin for 14 days    Acute renal failure superimposed on chronic kidney disease  Resolved  AMBROCIO is likely due to sepsis/UTI. Baseline creatinine is  1.2 . Most recent creatinine and eGFR are listed below.  Recent Labs     08/20/24  0550 08/21/24  0219   CREATININE 1.4 1.4   EGFRNORACEVR 55.8* 55.8*      Plan  - s/p 2L Ivf on admission   - Avoid nephrotoxins and renally dose meds for GFR listed above  - Monitor urine output, serial BMP, and adjust therapy as needed    COPD (chronic obstructive pulmonary disease)  Patient's COPD is controlled currently.  Patient is currently off COPD Pathway. Continue scheduled inhalers and monitor respiratory status closely.   - goal spO2 88%   - continue home inhalers  - duonebs prn  8/22 sats 93% on RA    BPH (benign prostatic hyperplasia)  - continue home flomax        VTE Risk Mitigation (From admission, onward)           Ordered     heparin (porcine) injection 5,000 Units  Every 8 hours         08/19/24 1054     IP VTE LOW RISK PATIENT  Once         08/18/24 1116     Place sequential compression device  Until discontinued         08/18/24 1116                    Discharge Planning   KAPIL: 8/22/2024     Code Status: Full Code   Is the patient medically ready for discharge?:     Reason for patient still in hospital (select all that apply): Treatment  Discharge Plan A: Home   Discharge Delays: None known at this time              Luis Soto MD  Department of Hospital Medicine   [facility]

## 2024-08-22 NOTE — ASSESSMENT & PLAN NOTE
Patient with 2/4 SIRS criteria on 8/18/2024 -  tachycardia and leukocytosis (WBC 23). Infectious work-up includes: CXR negative for consolidation COVID/RSV/flu negative no known skin lesions denies diarrhea. Possible sources include: Urinary Tract. UA with 3+ leuks, 3+ blood, >100 WBC, >100 RBCs. Blood cultures 8/18 positive 2/2 GNR bacteremia. Patient meets criteria for sepsis. Sensitivities resulted. E coli bacteremia. Continuing IV abx as patient still febrile.  - continue ceftriaxone (8/18 -)   - low threshold for renal ultrasound to evaluate for abscess    Antibiotics (72h ago, onward)      Start     Stop Route Frequency Ordered    08/20/24 0000  cefTRIAXone (ROCEPHIN) 2 g in D5W 100 mL IVPB (MB+)         -- IV Every 24 hours (non-standard times) 08/19/24 1054             8/22 Afebrile for 24h. BC x2 8/19 NGTD. switch to ciprofloxacin for 14 days

## 2024-08-22 NOTE — PLAN OF CARE
Problem: Adult Inpatient Plan of Care  Goal: Plan of Care Review  Outcome: Progressing  Goal: Patient-Specific Goal (Individualized)  Outcome: Progressing  Goal: Absence of Hospital-Acquired Illness or Injury  Outcome: Progressing  Goal: Optimal Comfort and Wellbeing  Outcome: Progressing  Goal: Readiness for Transition of Care  Outcome: Progressing     Problem: Sepsis/Septic Shock  Goal: Optimal Coping  Outcome: Progressing  Goal: Absence of Bleeding  Outcome: Progressing  Intervention: Monitor and Manage Bleeding  Flowsheets (Taken 8/22/2024 0248)  Bleeding Precautions: blood pressure closely monitored  Goal: Blood Glucose Level Within Targeted Range  Outcome: Progressing  Goal: Absence of Infection Signs and Symptoms  Outcome: Progressing  Goal: Optimal Nutrition Intake  Outcome: Progressing     Problem: Acute Kidney Injury/Impairment  Goal: Fluid and Electrolyte Balance  Outcome: Progressing  Intervention: Monitor and Manage Fluid and Electrolyte Balance  Flowsheets (Taken 8/22/2024 0248)  Fluid/Electrolyte Management: intravenous fluids adjusted  Goal: Improved Oral Intake  Outcome: Progressing  Intervention: Promote and Optimize Oral Intake  Flowsheets (Taken 8/22/2024 0248)  Oral Nutrition Promotion: rest periods promoted  Goal: Effective Renal Function  Outcome: Progressing  Intervention: Monitor and Support Renal Function  Flowsheets (Taken 8/22/2024 0248)  Stabilization Measures: legs elevated     Problem: Infection  Goal: Absence of Infection Signs and Symptoms  Outcome: Progressing

## 2024-08-22 NOTE — ASSESSMENT & PLAN NOTE
Patient's COPD is controlled currently.  Patient is currently off COPD Pathway. Continue scheduled inhalers and monitor respiratory status closely.   - goal spO2 88%   - continue home inhalers  - rivas prn  8/22 sats 93% on RA

## 2024-08-22 NOTE — PLAN OF CARE
Problem: Sepsis/Septic Shock  Goal: Optimal Coping  Outcome: Progressing     Problem: Infection  Goal: Absence of Infection Signs and Symptoms  Outcome: Progressing     Problem: Acute Kidney Injury/Impairment  Goal: Effective Renal Function  Outcome: Progressing     Problem: Fall Injury Risk  Goal: Absence of Fall and Fall-Related Injury  Outcome: Progressing

## 2024-08-22 NOTE — NURSING
Ultrasound contacted and they stated they have STATS first but will make a note that pt is pending discharge.

## 2024-08-22 NOTE — PLAN OF CARE
Problem: Adult Inpatient Plan of Care  Goal: Plan of Care Review  8/22/2024 0523 by Isabell Ling RN  Outcome: Progressing  8/22/2024 0248 by Isabell Ling RN  Outcome: Progressing  Goal: Patient-Specific Goal (Individualized)  8/22/2024 0523 by Isabell Ling RN  Outcome: Progressing  8/22/2024 0248 by Isabell Ling RN  Outcome: Progressing  Goal: Absence of Hospital-Acquired Illness or Injury  8/22/2024 0523 by Isabell Ling RN  Outcome: Progressing  8/22/2024 0248 by Isabell Ling RN  Outcome: Progressing  Goal: Optimal Comfort and Wellbeing  8/22/2024 0523 by Isabell Ling RN  Outcome: Progressing  8/22/2024 0248 by Isabell Ling RN  Outcome: Progressing  Goal: Readiness for Transition of Care  8/22/2024 0523 by Isabell Ling RN  Outcome: Progressing  8/22/2024 0248 by Isabell Ling RN  Outcome: Progressing     Problem: Sepsis/Septic Shock  Goal: Optimal Coping  8/22/2024 0523 by Isabell Ling RN  Outcome: Progressing  8/22/2024 0248 by Isabell Ling RN  Outcome: Progressing  Intervention: Optimize Psychosocial Adjustment to Illness  Flowsheets (Taken 8/22/2024 0523)  Supportive Measures:   self-reflection promoted   self-responsibility promoted  Family/Support System Care: involvement promoted  Goal: Absence of Bleeding  8/22/2024 0523 by Isabell Ling RN  Outcome: Progressing  8/22/2024 0248 by Isabell Ling RN  Outcome: Progressing  Intervention: Monitor and Manage Bleeding  Flowsheets (Taken 8/22/2024 0248)  Bleeding Precautions: blood pressure closely monitored  Goal: Blood Glucose Level Within Targeted Range  8/22/2024 0523 by Isabell Ling RN  Outcome: Progressing  8/22/2024 0248 by Isabell Ling RN  Outcome: Progressing  Goal: Absence of Infection Signs and Symptoms  8/22/2024 0523 by Isabell Ling RN  Outcome: Progressing  8/22/2024 0248 by Isabell Ling RN  Outcome: Progressing  Goal: Optimal Nutrition Intake  8/22/2024 0523 by Isabell Ling  RN  Outcome: Progressing  8/22/2024 0248 by Isabell Ling RN  Outcome: Progressing     Problem: Acute Kidney Injury/Impairment  Goal: Fluid and Electrolyte Balance  8/22/2024 0523 by Isabell Ling RN  Outcome: Progressing  8/22/2024 0248 by Isabell Ling RN  Outcome: Progressing  Intervention: Monitor and Manage Fluid and Electrolyte Balance  Flowsheets (Taken 8/22/2024 0248)  Fluid/Electrolyte Management: intravenous fluids adjusted  Goal: Improved Oral Intake  8/22/2024 0523 by Isabell Ling RN  Outcome: Progressing  8/22/2024 0248 by Isabell Ling RN  Outcome: Progressing  Intervention: Promote and Optimize Oral Intake  Flowsheets (Taken 8/22/2024 0248)  Oral Nutrition Promotion: rest periods promoted  Goal: Effective Renal Function  8/22/2024 0523 by Isabell Ling RN  Outcome: Progressing  8/22/2024 0248 by Isabell Ling RN  Outcome: Progressing  Intervention: Monitor and Support Renal Function  Flowsheets (Taken 8/22/2024 0248)  Stabilization Measures: legs elevated     Problem: Infection  Goal: Absence of Infection Signs and Symptoms  8/22/2024 0523 by Isabell Ling RN  Outcome: Progressing  8/22/2024 0248 by Isabell Ling RN  Outcome: Progressing

## 2024-08-23 NOTE — PLAN OF CARE
[facility] - Med Surg  Discharge Final Note    Primary Care Provider: Suzette [facility]en [facility]    Expected Discharge Date: 8/22/2024    Per nursing note, pt discharged against medical advise.      Final Discharge Note (most recent)       Final Note - 08/23/24 0749          Final Note    Assessment Type Final Discharge Note     Anticipated Discharge Disposition Left Against Medical Advice        Post-Acute Status    Post-Acute Authorization Other     Coverage [employer] Man[employer] Medicare     Other Status No Post-Acute Service Needs     Discharge Delays None known at this time                     Important Message from Medicare  Important Message from Medicare regarding Discharge Appeal Rights: Given to patient/caregiver, Explained to patient/caregiver, Signed/date by patient/caregiver     Date IMM was signed: 08/21/24  Time IMM was signed: 0923    Contact Info       [facility]   Relationship: PCP - General    Shiva ORLANDO  [facility] [id number]   Phone: [phone]       Next Steps: Follow up          Morena Nicolas LMSW  Part-Time-  [facility]  Ext. [phone]

## 2024-08-24 LAB
BACTERIA BLD CULT: NORMAL
BACTERIA BLD CULT: NORMAL

## 2024-10-16 ENCOUNTER — PATIENT MESSAGE (OUTPATIENT)
Dept: RESEARCH | Facility: HOSPITAL | Age: 64
End: 2024-10-16
Payer: MEDICARE

## 2025-08-01 ENCOUNTER — HOSPITAL ENCOUNTER (EMERGENCY)
Facility: HOSPITAL | Age: 65
Discharge: HOME | End: 2025-08-01
Attending: EMERGENCY MEDICINE
Payer: MEDICARE

## 2025-08-01 VITALS
OXYGEN SATURATION: 97 % | RESPIRATION RATE: 16 BRPM | SYSTOLIC BLOOD PRESSURE: 128 MMHG | HEIGHT: 64 IN | WEIGHT: 131.19 LBS | TEMPERATURE: 98 F | HEART RATE: 88 BPM | DIASTOLIC BLOOD PRESSURE: 82 MMHG | BODY MASS INDEX: 22.4 KG/M2

## 2025-08-01 DIAGNOSIS — S61.412A LACERATION OF LEFT HAND WITHOUT FOREIGN BODY, INITIAL ENCOUNTER: ICD-10-CM

## 2025-08-01 DIAGNOSIS — Z72.0 TOBACCO USE: Primary | ICD-10-CM

## 2025-08-01 PROCEDURE — 63600175 PHARM REV CODE 636 W HCPCS: Performed by: EMERGENCY MEDICINE

## 2025-08-01 PROCEDURE — 12001 RPR S/N/AX/GEN/TRNK 2.5CM/<: CPT

## 2025-08-01 PROCEDURE — 25000003 PHARM REV CODE 250: Performed by: EMERGENCY MEDICINE

## 2025-08-01 PROCEDURE — 90471 IMMUNIZATION ADMIN: CPT | Performed by: EMERGENCY MEDICINE

## 2025-08-01 PROCEDURE — 96372 THER/PROPH/DIAG INJ SC/IM: CPT | Performed by: EMERGENCY MEDICINE

## 2025-08-01 PROCEDURE — 90715 TDAP VACCINE 7 YRS/> IM: CPT | Performed by: EMERGENCY MEDICINE

## 2025-08-01 PROCEDURE — 99283 EMERGENCY DEPT VISIT LOW MDM: CPT | Mod: 25

## 2025-08-01 RX ORDER — LIDOCAINE HYDROCHLORIDE 10 MG/ML
10 INJECTION, SOLUTION EPIDURAL; INFILTRATION; INTRACAUDAL; PERINEURAL
Status: COMPLETED | OUTPATIENT
Start: 2025-08-01 | End: 2025-08-01

## 2025-08-01 RX ORDER — IBUPROFEN 200 MG
1 TABLET ORAL DAILY
Qty: 28 PATCH | Refills: 3 | Status: SHIPPED | OUTPATIENT
Start: 2025-08-01

## 2025-08-01 RX ORDER — BACITRACIN ZINC 500 [USP'U]/G
1 OINTMENT TOPICAL
Status: COMPLETED | OUTPATIENT
Start: 2025-08-01 | End: 2025-08-01

## 2025-08-01 RX ADMIN — BACITRACIN 1 EACH: 500 OINTMENT TOPICAL at 10:08

## 2025-08-01 RX ADMIN — CLOSTRIDIUM TETANI TOXOID ANTIGEN (FORMALDEHYDE INACTIVATED), CORYNEBACTERIUM DIPHTHERIAE TOXOID ANTIGEN (FORMALDEHYDE INACTIVATED), BORDETELLA PERTUSSIS TOXOID ANTIGEN (GLUTARALDEHYDE INACTIVATED), BORDETELLA PERTUSSIS FILAMENTOUS HEMAGGLUTININ ANTIGEN (FORMALDEHYDE INACTIVATED), BORDETELLA PERTUSSIS PERTACTIN ANTIGEN, AND BORDETELLA PERTUSSIS FIMBRIAE 2/3 ANTIGEN 0.5 ML: 5; 2; 2.5; 5; 3; 5 INJECTION, SUSPENSION INTRAMUSCULAR at 08:08

## 2025-08-01 RX ADMIN — LIDOCAINE HYDROCHLORIDE 100 MG: 10 INJECTION, SOLUTION EPIDURAL; INFILTRATION; INTRACAUDAL at 09:08

## 2025-08-11 ENCOUNTER — HOSPITAL ENCOUNTER (EMERGENCY)
Facility: HOSPITAL | Age: 65
Discharge: HOME | End: 2025-08-11
Attending: EMERGENCY MEDICINE
Payer: COMMERCIAL

## 2025-08-11 VITALS
RESPIRATION RATE: 18 BRPM | SYSTOLIC BLOOD PRESSURE: 143 MMHG | HEART RATE: 88 BPM | WEIGHT: 135 LBS | OXYGEN SATURATION: 96 % | TEMPERATURE: 98 F | DIASTOLIC BLOOD PRESSURE: 74 MMHG | HEIGHT: 65 IN | BODY MASS INDEX: 22.49 KG/M2

## 2025-08-11 DIAGNOSIS — Z48.02 VISIT FOR SUTURE REMOVAL: ICD-10-CM

## 2025-08-11 DIAGNOSIS — S61.412D LACERATION OF LEFT HAND WITHOUT FOREIGN BODY, SUBSEQUENT ENCOUNTER: Primary | ICD-10-CM

## 2025-08-11 PROCEDURE — 99281 EMR DPT VST MAYX REQ PHY/QHP: CPT

## 2025-08-18 ENCOUNTER — HOSPITAL ENCOUNTER (EMERGENCY)
Facility: HOSPITAL | Age: 65
Discharge: HOME | End: 2025-08-18
Attending: EMERGENCY MEDICINE
Payer: COMMERCIAL

## 2025-08-18 VITALS
WEIGHT: 135 LBS | DIASTOLIC BLOOD PRESSURE: 80 MMHG | HEART RATE: 95 BPM | SYSTOLIC BLOOD PRESSURE: 132 MMHG | OXYGEN SATURATION: 95 % | RESPIRATION RATE: 16 BRPM | BODY MASS INDEX: 22.81 KG/M2

## 2025-08-18 DIAGNOSIS — Z48.02 VISIT FOR SUTURE REMOVAL: Primary | ICD-10-CM

## 2025-08-18 PROCEDURE — 99999 HC NO LEVEL OF SERVICE - ED ONLY: CPT

## 2025-08-18 RX ORDER — IBUPROFEN 200 MG
1 TABLET ORAL DAILY
Qty: 28 PATCH | Refills: 3 | Status: SHIPPED | OUTPATIENT
Start: 2025-08-18 | End: 2025-08-20

## 2025-08-20 ENCOUNTER — CLINICAL SUPPORT (OUTPATIENT)
Dept: SMOKING CESSATION | Facility: CLINIC | Age: 65
End: 2025-08-20
Payer: COMMERCIAL

## 2025-08-20 DIAGNOSIS — F17.200 NICOTINE DEPENDENCE: Primary | ICD-10-CM

## 2025-08-20 PROCEDURE — 99999 PR PBB SHADOW E&M-EST. PATIENT-LVL II: CPT | Mod: PBBFAC,,, | Performed by: DIETITIAN, REGISTERED

## 2025-08-20 PROCEDURE — 99404 PREV MED CNSL INDIV APPRX 60: CPT | Mod: ,,, | Performed by: DIETITIAN, REGISTERED

## 2025-08-20 RX ORDER — IBUPROFEN 200 MG
1 TABLET ORAL DAILY
Qty: 14 PATCH | Refills: 0 | Status: SHIPPED | OUTPATIENT
Start: 2025-08-20

## 2025-09-03 ENCOUNTER — TELEPHONE (OUTPATIENT)
Dept: SMOKING CESSATION | Facility: CLINIC | Age: 65
End: 2025-09-03
Payer: COMMERCIAL